# Patient Record
Sex: FEMALE | Race: BLACK OR AFRICAN AMERICAN | NOT HISPANIC OR LATINO | Employment: FULL TIME | URBAN - METROPOLITAN AREA
[De-identification: names, ages, dates, MRNs, and addresses within clinical notes are randomized per-mention and may not be internally consistent; named-entity substitution may affect disease eponyms.]

---

## 2017-03-09 ENCOUNTER — OFFICE VISIT (OUTPATIENT)
Dept: ORTHOPEDICS | Facility: CLINIC | Age: 67
End: 2017-03-09
Payer: MEDICARE

## 2017-03-09 ENCOUNTER — HOSPITAL ENCOUNTER (OUTPATIENT)
Dept: RADIOLOGY | Facility: HOSPITAL | Age: 67
Discharge: HOME | End: 2017-03-09
Attending: ORTHOPAEDIC SURGERY
Payer: MEDICARE

## 2017-03-09 DIAGNOSIS — Z96.649 STATUS POST HIP REPLACEMENT, UNSPECIFIED LATERALITY: Primary | ICD-10-CM

## 2017-03-09 DIAGNOSIS — M54.50 MIDLINE LOW BACK PAIN WITHOUT SCIATICA, UNSPECIFIED CHRONICITY: ICD-10-CM

## 2017-03-09 DIAGNOSIS — Z96.649 STATUS POST HIP REPLACEMENT, UNSPECIFIED LATERALITY: ICD-10-CM

## 2017-03-09 PROCEDURE — 1160F RVW MEDS BY RX/DR IN RCRD: CPT | Mod: S$GLB,,, | Performed by: ORTHOPAEDIC SURGERY

## 2017-03-09 PROCEDURE — 1159F MED LIST DOCD IN RCRD: CPT | Mod: S$GLB,,, | Performed by: ORTHOPAEDIC SURGERY

## 2017-03-09 PROCEDURE — 1157F ADVNC CARE PLAN IN RCRD: CPT | Mod: S$GLB,,, | Performed by: ORTHOPAEDIC SURGERY

## 2017-03-09 PROCEDURE — 72170 X-RAY EXAM OF PELVIS: CPT | Mod: 26,,, | Performed by: RADIOLOGY

## 2017-03-09 PROCEDURE — 99213 OFFICE O/P EST LOW 20 MIN: CPT | Mod: S$GLB,,, | Performed by: ORTHOPAEDIC SURGERY

## 2017-03-09 PROCEDURE — 99999 PR PBB SHADOW E&M-EST. PATIENT-LVL II: CPT | Mod: PBBFAC,,, | Performed by: ORTHOPAEDIC SURGERY

## 2017-03-09 PROCEDURE — 72170 X-RAY EXAM OF PELVIS: CPT | Mod: TC

## 2017-03-09 RX ORDER — TRAMADOL HYDROCHLORIDE 50 MG/1
50 TABLET ORAL
Qty: 60 TABLET | Refills: 2 | Status: SHIPPED | OUTPATIENT
Start: 2017-03-09 | End: 2019-05-14 | Stop reason: SDUPTHER

## 2017-03-09 NOTE — PROGRESS NOTES
CC:  Left hip replacement    Hx:  Kamla Espinoza presents for routine follow up of left hip replacement.  Her procedure was performed 1 year ago.  She has done well postoperatively and she denies effusions, warmth, or fever. She has been having episodic LBP which in general is worsening.    ROS:    Admits low back pain without distal paresthesias, or radicular pain.  Denies Distal edema or calf pain.  Denies fevers.    PE:  No warmth, erythema, or effusion.  Stable to internal/external rotation without pain  No significant pain with palpation over the greater trochanter.  No distal edema.    AP pelvis radiograph was ordered and reviewed and shows no evidence of loosening or wear.    IMP: S/P total hip replacement    Plan: Dental prophylaxis was discussed.    She may follow up prn.    Will initiate PT for LBP.

## 2018-07-25 ENCOUNTER — OFFICE VISIT (OUTPATIENT)
Dept: ORTHOPEDICS | Facility: CLINIC | Age: 68
End: 2018-07-25
Payer: MEDICARE

## 2018-07-25 ENCOUNTER — HOSPITAL ENCOUNTER (OUTPATIENT)
Dept: RADIOLOGY | Facility: HOSPITAL | Age: 68
Discharge: HOME | End: 2018-07-25
Attending: ORTHOPAEDIC SURGERY
Payer: MEDICARE

## 2018-07-25 VITALS
HEART RATE: 114 BPM | DIASTOLIC BLOOD PRESSURE: 72 MMHG | HEIGHT: 64 IN | WEIGHT: 250.31 LBS | BODY MASS INDEX: 42.73 KG/M2 | SYSTOLIC BLOOD PRESSURE: 154 MMHG

## 2018-07-25 DIAGNOSIS — M17.0 PRIMARY OSTEOARTHRITIS OF BOTH KNEES: ICD-10-CM

## 2018-07-25 DIAGNOSIS — M25.562 LEFT KNEE PAIN, UNSPECIFIED CHRONICITY: Primary | ICD-10-CM

## 2018-07-25 DIAGNOSIS — M25.562 LEFT KNEE PAIN, UNSPECIFIED CHRONICITY: ICD-10-CM

## 2018-07-25 PROCEDURE — 3078F DIAST BP <80 MM HG: CPT | Mod: CPTII,S$GLB,, | Performed by: ORTHOPAEDIC SURGERY

## 2018-07-25 PROCEDURE — 20610 DRAIN/INJ JOINT/BURSA W/O US: CPT | Mod: 50,S$GLB,, | Performed by: ORTHOPAEDIC SURGERY

## 2018-07-25 PROCEDURE — 99999 PR PBB SHADOW E&M-EST. PATIENT-LVL III: CPT | Mod: PBBFAC,,, | Performed by: ORTHOPAEDIC SURGERY

## 2018-07-25 PROCEDURE — 3077F SYST BP >= 140 MM HG: CPT | Mod: CPTII,S$GLB,, | Performed by: ORTHOPAEDIC SURGERY

## 2018-07-25 PROCEDURE — 73565 X-RAY EXAM OF KNEES: CPT | Mod: TC

## 2018-07-25 PROCEDURE — 73565 X-RAY EXAM OF KNEES: CPT | Mod: 26,,, | Performed by: RADIOLOGY

## 2018-07-25 PROCEDURE — 99213 OFFICE O/P EST LOW 20 MIN: CPT | Mod: 25,GC,S$GLB, | Performed by: ORTHOPAEDIC SURGERY

## 2018-07-25 RX ORDER — TRIAMCINOLONE ACETONIDE 40 MG/ML
80 INJECTION, SUSPENSION INTRA-ARTICULAR; INTRAMUSCULAR
Status: DISCONTINUED | OUTPATIENT
Start: 2018-07-25 | End: 2018-07-25 | Stop reason: HOSPADM

## 2018-07-25 RX ADMIN — TRIAMCINOLONE ACETONIDE 80 MG: 40 INJECTION, SUSPENSION INTRA-ARTICULAR; INTRAMUSCULAR at 11:07

## 2018-07-25 NOTE — PROCEDURES
Large Joint Aspiration/Injection  Date/Time: 7/25/2018 11:04 AM  Performed by: LIOR HERNANDEZ  Authorized by: LIOR HERNANDEZ     Consent Done?:  Yes (Verbal)  Indications:  Pain  Procedure site marked: Yes    Timeout: Prior to procedure the correct patient, procedure, and site was verified      Location:  Knee  Site:  L knee and R knee  Prep: Patient was prepped and draped in usual sterile fashion    Ultrasonic Guidance for needle placement: No  Needle size:  22 G  Approach:  Anteromedial  Medications:  80 mg triamcinolone acetonide 40 mg/mL

## 2018-07-25 NOTE — LETTER
July 25, 2018      Shannan Wilson MD  [address]  [address]  [facility] [address]           [facility] - Orthopedics  [address], [address]  [facility] [address]  Phone: [phone]          Patient: Kamla Espinoza   MR Number: [id number]   YOB: 1950   Date of Visit: 7/25/2018       Dear Dr. Shannan Wilson:    Thank you for referring Kamla Espinoza to me for evaluation. Attached you will find relevant portions of my assessment and plan of care.    If you have questions, please do not hesitate to call me. I look forward to following Kamla Espinoza along with you.    Sincerely,    Case Javed MD    Enclosure  CC:  No Recipients    If you would like to receive this communication electronically, please contact [email or web address] or [phone] to request more information on [employer] Link access.    For providers and/or their staff who would like to refer a patient to [facility], please contact us through our one-stop-shop provider referral line, [facility], at [phone].    If you feel you have received this communication in error or would no longer like to receive these types of communications, please e-mail [email or web address]

## 2018-07-25 NOTE — PROGRESS NOTES
CHIEF COMPLAINT: Left knee pain.                                                          HISTORY OF PRESENT ILLNESS:  The patient is a 67 y.o. female  who presents for evaluation of her left knee pain. She also has right knee pain    Pain Duration: 2 years  Pain Quality: throbbing  Pain Context:worsening  Pain Timing: near constant  Pain Location:global  Pain Severity: moderate  Modifying Factors: Failed ibuprofen  Associated Signs and Symptoms: LBP with LLE sciatica    She  presents for further treatment recommendations.   She denies distal paresthesias, lower extremity edema. She does report calf pain that is worse with activity.  She has no history of discreet prior trauma.                                                                                                                       PAST MEDICAL HISTORY:    Past Medical History:   Diagnosis Date    Diabetes mellitus, type 2     High cholesterol     Hypertension                                                                                                             PAST SURGICAL HISTORY:    Past Surgical History:   Procedure Laterality Date    HIP SURGERY Left 04/22/2016    THR                                                                                                               SOCIAL HISTORY:  Reviewed per EPIC history for tobacco or alcohol use and she   is an active  67 y.o.  female                                                                             FAMILY MEDICAL HISTORY:  family history includes Diabetes in her mother; Heart disease in her brother, father, and mother; Lung disease in her brother.                                                                REVIEW OF SYSTEMS: Patient denies constitutional symptoms, cardiac symptoms, respiratory symptoms, GI symptoms. The remainder of the musculoskeletal ROS is included in the HPI.                                                                                                     "       PHYSICAL EXAMINATION:      Estimated body mass index is 42.97 kg/m² as calculated from the following:    Height as of this encounter: 5' 4" (1.626 m).    Weight as of this encounter: 113.6 kg (250 lb 5.3 oz).                                                          GENERAL:  A well-developed, well-nourished 67 y.o. female who is alert and  oriented in no acute distress.      Gait: She  walks with a normal gait.                   EXTREMITIES:  Examination of lower extremities reveals there is no visible mass or deformity.    Left knee:  ROM 5-110    Ligamentously stable to varus/valgus stress. Positive crepitation    Anterior and posterior drawers negative.    No pain over pes bursa.    No warmth    No erythema    Effusion yes    Right knee:  ROM 0-110    Ligamentously stable to varus/valgus stress.  Positive crepitation    Anterior and posterior drawers negative.    No pain over pes bursa.    No warmth    No erythema    Effusion Yes    The skin over both lower extremities is normal and unremarkable.  She has a  painless range of motion of the hips and ankles bilaterally.   She has no calf tenderness to palpation nand mild edema.    AP standing radiographs of the knees were ordered and personally reviewed with the patient today.  Radiographs show advanced DJD with joint space narrowing, sclerosis, and osteophytes.                                                                            IMPRESSION: Osteoarthritis bilateral knees.                             The conservative options including NSAIDs, activity modification, physical therapy, corticosteroid injection, and viscosupplimentation were discussed.  Will proceed with injections.  After time out was performed and patient ID, side, and site were verified, the both left and right  knees were sterilly prepped in the standard fashion.  A 22-gauge needle was introduced into each joint from an infero-medial site without complication. Each knee was then " injected with 40 mg kenelog and 3 cc 1% plain lidocaine.  Sterile dressing was applied.  The patient was informed that they may resume activities as tolerated.  Elevation of glucose in diabetic patients was discussed.    I have counselled her on need for weight reduction.    I have personally interviewed and evaluated the patient.  I have reviewed the resident physician's note and I concur with the findings.

## 2018-12-10 ENCOUNTER — TELEPHONE (OUTPATIENT)
Dept: ORTHOPEDICS | Facility: CLINIC | Age: 68
End: 2018-12-10

## 2018-12-10 NOTE — TELEPHONE ENCOUNTER
Called pt and left vm regards to scheduling an appt with Ms. Durbin for her injection. Advise pt to call back to schedule appt.

## 2019-05-14 ENCOUNTER — OFFICE VISIT (OUTPATIENT)
Dept: ORTHOPEDICS | Facility: CLINIC | Age: 69
End: 2019-05-14
Payer: MEDICARE

## 2019-05-14 ENCOUNTER — HOSPITAL ENCOUNTER (OUTPATIENT)
Dept: RADIOLOGY | Facility: HOSPITAL | Age: 69
Discharge: HOME | End: 2019-05-14
Attending: PHYSICIAN ASSISTANT
Payer: MEDICARE

## 2019-05-14 ENCOUNTER — TELEPHONE (OUTPATIENT)
Dept: ORTHOPEDICS | Facility: CLINIC | Age: 69
End: 2019-05-14

## 2019-05-14 VITALS
DIASTOLIC BLOOD PRESSURE: 78 MMHG | HEART RATE: 75 BPM | BODY MASS INDEX: 42.55 KG/M2 | WEIGHT: 249.25 LBS | SYSTOLIC BLOOD PRESSURE: 131 MMHG | HEIGHT: 64 IN

## 2019-05-14 DIAGNOSIS — M17.0 PRIMARY OSTEOARTHRITIS OF BOTH KNEES: Primary | ICD-10-CM

## 2019-05-14 DIAGNOSIS — M25.562 PAIN IN BOTH KNEES, UNSPECIFIED CHRONICITY: ICD-10-CM

## 2019-05-14 DIAGNOSIS — M25.561 PAIN IN BOTH KNEES, UNSPECIFIED CHRONICITY: ICD-10-CM

## 2019-05-14 PROCEDURE — 3075F SYST BP GE 130 - 139MM HG: CPT | Mod: CPTII,S$GLB,, | Performed by: PHYSICIAN ASSISTANT

## 2019-05-14 PROCEDURE — 99213 PR OFFICE/OUTPT VISIT, EST, LEVL III, 20-29 MIN: ICD-10-PCS | Mod: S$GLB,,, | Performed by: PHYSICIAN ASSISTANT

## 2019-05-14 PROCEDURE — 3078F DIAST BP <80 MM HG: CPT | Mod: CPTII,S$GLB,, | Performed by: PHYSICIAN ASSISTANT

## 2019-05-14 PROCEDURE — 3075F PR MOST RECENT SYSTOLIC BLOOD PRESS GE 130-139MM HG: ICD-10-PCS | Mod: CPTII,S$GLB,, | Performed by: PHYSICIAN ASSISTANT

## 2019-05-14 PROCEDURE — 99999 PR PBB SHADOW E&M-EST. PATIENT-LVL IV: ICD-10-PCS | Mod: PBBFAC,,, | Performed by: PHYSICIAN ASSISTANT

## 2019-05-14 PROCEDURE — 73564 X-RAY EXAM KNEE 4 OR MORE: CPT | Mod: 26,50,, | Performed by: RADIOLOGY

## 2019-05-14 PROCEDURE — 3078F PR MOST RECENT DIASTOLIC BLOOD PRESSURE < 80 MM HG: ICD-10-PCS | Mod: CPTII,S$GLB,, | Performed by: PHYSICIAN ASSISTANT

## 2019-05-14 PROCEDURE — 73564 XR KNEE ORTHO BILAT WITH FLEXION: ICD-10-PCS | Mod: 26,50,, | Performed by: RADIOLOGY

## 2019-05-14 PROCEDURE — 99999 PR PBB SHADOW E&M-EST. PATIENT-LVL IV: CPT | Mod: PBBFAC,,, | Performed by: PHYSICIAN ASSISTANT

## 2019-05-14 PROCEDURE — 73564 X-RAY EXAM KNEE 4 OR MORE: CPT | Mod: TC,50

## 2019-05-14 PROCEDURE — 99213 OFFICE O/P EST LOW 20 MIN: CPT | Mod: S$GLB,,, | Performed by: PHYSICIAN ASSISTANT

## 2019-05-14 RX ORDER — LEVOCETIRIZINE DIHYDROCHLORIDE 5 MG/1
TABLET, FILM COATED ORAL
Refills: 4 | COMMUNITY
Start: 2019-05-07 | End: 2019-11-15

## 2019-05-14 RX ORDER — FLUTICASONE PROPIONATE 50 MCG
1 SPRAY, SUSPENSION (ML) NASAL
COMMUNITY

## 2019-05-14 NOTE — TELEPHONE ENCOUNTER
Notified pt that the provider will late; due to accident [place] the [place] of [facility]. New appointment for pt is 5/14/19 at  2:00 pm. Patient states verbal understanding and has no further questions.

## 2019-05-14 NOTE — PROGRESS NOTES
SUBJECTIVE:     Chief Complaint & History of Present Illness:  Kamla Espinoza is a 68 y.o. year old female, established patient, here with a history of constant bilateral knee pain which started years ago.  There is not a history of trauma.  The pain is located in the global aspect of the knee.  The pain is described as achy, 10/10.  There is radiation.  There is not catching or locking.  Aggravating factors include any weight bearing.  Associated symptoms include effusion, crepitus sensation.  There is not numbness or tingling of the lower extremity.  There is back pain. Previous treatments include OTC NSAIDs, rest and cortisone injections which have provided minimal relief.  There is not a history of previous injury or surgery to the knee.  The patient does use an assistive device.    Review of patient's allergies indicates:  No Known Allergies      Current Outpatient Medications   Medication Sig Dispense Refill    ACCU-CHEK JHONATHAN CONTROL SOLN Soln       ACCU-CHEK JHONATHAN PLUS TEST STRP Strp       ACCU-CHEK SOFTCLIX LANCETS Misc       acetaminophen (TYLENOL) 650 MG TbSR Take 650 mg by mouth 3 (three) times daily as needed. Hip pain      amlodipine (NORVASC) 5 MG tablet Take 10 mg by mouth once daily.       aspirin (ECOTRIN) 81 MG EC tablet Take 81 mg by mouth once daily.      BD ALCOHOL SWABS PadM       calcium carbonate 300 mg (750 mg) Chew Take by mouth. tums for acid reflux      diclofenac sodium 1 % Gel Apply 2 g topically as needed.       fluticasone propionate (FLONASE) 50 mcg/actuation nasal spray 1 spray by Nasal route.      levocetirizine (XYZAL) 5 MG tablet TAKE ONE TABLET BY MOUTH EVERY DAY IN THE EVENING AS NEEDED FOR ALLERGY SYMPTOMS.  4    lisinopril-hydrochlorothiazide (PRINZIDE,ZESTORETIC) 20-25 mg Tab Take 1 tablet by mouth once daily.      metformin (GLUCOPHAGE) 500 MG tablet Take 500 mg by mouth 2 (two) times daily with meals.      tramadol (ULTRAM) 50 mg tablet Take 50 mg by  "mouth every 6 (six) hours as needed for Pain.       No current facility-administered medications for this visit.        Past Medical History:   Diagnosis Date    Diabetes mellitus, type 2     High cholesterol     Hypertension        Past Surgical History:   Procedure Laterality Date    HIP SURGERY Left 04/22/2016    THR    REPLACEMENT-HIP-TOTAL WITH NAVIGATION Left 4/22/2016    Performed by Case Javed MD at [facility] OR [address] [place] [address]       Vital Signs (Most Recent)  Vitals:    05/14/19 1356   BP: 131/78   Pulse: 75           Review of Systems:  ROS:  Constitutional: no fever or chills  Eyes: no visual changes  ENT: no nasal congestion or sore throat  Respiratory: no cough or shortness of breath  Cardiovascular: no chest pain or palpitations, positive for HTN  Gastrointestinal: no nausea or vomiting, tolerating diet  Genitourinary: no hematuria or dysuria  Integument/Breast: no rash or pruritis  Hematologic/Lymphatic: no easy bruising or lymphadenopathy  Musculoskeletal: positive for bilateral knee pain  Neurological: no seizures or tremors  Behavioral/Psych: no auditory or visual hallucinations  Endocrine: no heat or cold intolerance, positive for T2DM    OBJECTIVE:     PHYSICAL EXAM:  Height: 5' 4" (162.6 cm) Weight: 113 kg (249 lb 3.7 oz), General Appearance: Well nourished, well developed, in no acute distress.  Neurological: Mood & affect are normal.  left  Knee Exam:  Knee Range of Motion:5-90 degrees flexion   Effusion:not significant  Condition of skin:intact  Location of tenderness:Medial joint line and Lateral joint line   Strength:4 of 5  Stability:  Lachman: stable, LCL: stable, MCL: stable, PCL: stable and posteromedial (dial): stable  Varus /Valgus stress:  normal  Yaneth:   negative/negative    right  Knee Exam:  Knee Range of Motion:0-90 degrees flexion   Effusion:not significant  Condition of skin:intact  Location of tenderness:Medial joint line and Lateral joint line   Strength:4 of " 5  Stability:  Lachman: stable, LCL: stable, MCL: stable, PCL: stable and posteromedial (dial): stable  Varus /Valgus stress:  normal  Yaneth:   negative/negative    RADIOGRAPHS:  Xray of bilateral knee taken in clinic today, images reviewed by me, demonstrates severe tricompartmental narrowing of bilateral knee joint without evidence of fracture or dislocation.    ASSESSMENT/PLAN:     Plan: We discussed with the patient at length all the different treatment options available for the knee including anti-inflammatories, acetaminophen, rest, ice, knee strengthening exercise, occasional cortisone injections for temporary relief, Viscosupplimentation injections, arthroscopic debridement osteotomy, and finally knee arthroplasty.   I discussed weight loss with patient to reach BMI goal of 40 to have knee replacement.  I discussed with her that if I give her a cortisone injection today, a knee replacement would be delayed by 3 months minimum.  Patient states she will withhold from injection at this time.  She has been scheduled with Dr. Saucedo for discussion of knee replacement.  Patient has been losing weight and states she will be able to lose weight needed to have surgery.  Goal weight is 232lbs at this time.  I have referred to Bariatric Medicine as well to help with weight loss.  I will also prescribe compound cream for knee pain.  Patient verbalized understanding and agrees.    Final Diagnosis: Primary osteoarthritis knee, bilateral.  Knee pain, bilateral.

## 2019-07-01 NOTE — PROGRESS NOTES
Subjective:       Patient ID: Kamla Espinoza is a 68 y.o. female.    Chief Complaint: Consult    CC: Weight.    Current attempts at weight loss: New pt to me, referred by Shahram Wilson PA-C  [address]  [place] [address] , with Patient Active Problem List:     Renal impairment- possible CKD 2     Morbid obesity     Type 2 diabetes mellitus with diabetic chronic kidney disease- last A1c 7.4 % on metformin     Essential hypertension     Cardiac murmur     Abnormal EKG     HLD (hyperlipidemia)     Edema     RBC microcytosis     Degenerative joint disease (DJD) of hip     Osteoarthritis of left hip   Lab Results       Component                Value               Date                       CREATININE               0.8                 04/23/2016                 BUN                      16                  04/23/2016                 NA                       138                 04/23/2016                 K                        4.0                 04/23/2016                 CL                       106                 04/23/2016                 CO2                      26                  04/23/2016               Has been cutting back on her portions and starches. She uses a cane. Walks around the house. Light house work. Has not tried water exercise.       Previous diet attempts: Has tried lots of diets, but none for very long. Maybe for a few weeks.     History of medication for loss: Denies.   checked today     Heaviest weight: 265#    Lightest weight:130#    Goal weight: Goal weight is 232lbs at this time for TKR. She would like to get 150#.      Last eye exam:      2 months ago. No glaucoma per pt.      Provider:    Typical eating patterns:  Retired. Lives with daughter, 2 granddaughters and great grandson. Her daughter cooks   Breakfast: toast (1), boiled eggs. Grits.     Lunch: sandwich- luncheon meat, ham with chips.     Dinner: spaghetti, cabbage, meatloaf or chicken with mashed pot. Chicken  "salad. Does not eat out often.    Snacks: popcorn,      Beverages: water, coffee, Iced tea with AS.     Willingness to change: 9/10    EKG: Normal sinus rhythm  Minimal voltage criteria for LVH, may be normal variant  ST and/or T wave abnormalities secondary to hypertrophy and/or T wave  abnormality, consider lateral ischemia  Abnormal ECG  No previous ECGs available    BMR: 1628    Review of Systems   Constitutional: Negative for chills and fever.   Respiratory: Negative for shortness of breath.         + snores   Cardiovascular: Positive for leg swelling. Negative for chest pain.   Gastrointestinal: Negative for constipation and diarrhea.        + gerd   Genitourinary: Positive for frequency. Negative for difficulty urinating and dysuria.   Musculoskeletal: Positive for arthralgias and back pain.   Neurological: Negative for dizziness and light-headedness.   Psychiatric/Behavioral: Negative for dysphoric mood. The patient is not nervous/anxious.        Objective:     /62   Pulse 83   Ht 5' 3.5" (1.613 m)   Wt 112.8 kg (248 lb 10.9 oz)   BMI 43.36 kg/m²    Physical Exam   Constitutional: She is oriented to person, place, and time. She appears well-developed. No distress.   Morbidly obese     HENT:   Head: Normocephalic and atraumatic.   Eyes: Pupils are equal, round, and reactive to light. EOM are normal. No scleral icterus.   Neck: Normal range of motion. Neck supple. No thyromegaly present.   Cardiovascular: Normal rate and normal heart sounds. Exam reveals no gallop and no friction rub.   No murmur heard.  Pulmonary/Chest: Effort normal and breath sounds normal. No respiratory distress. She has no wheezes.   Abdominal: Soft. Bowel sounds are normal. She exhibits no distension. There is no tenderness.   Musculoskeletal: Normal range of motion. She exhibits no edema.   Lymphadenopathy:     She has no cervical adenopathy.   Neurological: She is alert and oriented to person, place, and time. No cranial " nerve deficit.   Skin: Skin is warm and dry. No erythema.   Psychiatric: She has a normal mood and affect. Her behavior is normal. Judgment normal.   Vitals reviewed.      Assessment:       1. Class 3 severe obesity due to excess calories with serious comorbidity and body mass index (BMI) of 40.0 to 44.9 in adult    2. Type 2 diabetes mellitus with stage 2 chronic kidney disease, without long-term current use of insulin    3. Essential hypertension    4. Hyperlipidemia, unspecified hyperlipidemia type    5. Primary osteoarthritis of hip, unspecified laterality    6. Edema, unspecified type        Plan:         1. Class 3 severe obesity due to excess calories with serious comorbidity and body mass index (BMI) of 40.0 to 44.9 in adult  Patient was informed that topiramate is used for migraine prevention and seizures. Weight loss is a common side effect that is well documented. S/he understands this. S/he was informed of the potential side effects such as serious and possibly fatal rash in which case the medication should be discontinued immediately. Paresthesias, forgetfulness, fatigue, kidney stones, GI symptoms, and changes in lab values such as electrolytes, blood counts and kidney function.    Start topiramate  in the evening for 1 week, then morning and evening.         Contact your health insurance about your free gym membership, specifically if there is a location with a pool.       Sit and Be Fit exercise program on [employer], [employer] or youtube 3-4 times a week this month     Limit starchy carbohydrates (bread, rice, pasta, potatoes, corn, peas, oatmeal, grits, tortillas, crackers, chips)    800-1000 tayler menu and meal ideas given.   2. Type 2 diabetes mellitus with stage 2 chronic kidney disease, without long-term current use of insulin  Expect improvement with weight loss. Recheck after 10% TBW lost.      3. Essential hypertension  The current medical regimen is effective;  continue present plan and medications.  Expect improvement with weight loss.     4. Hyperlipidemia, unspecified hyperlipidemia type  Expect improvement with weight loss. Recheck after 10% TBW lost.      5. Primary osteoarthritis of hip, unspecified laterality  Increase low impact activity as tolerated.  Avoid high impact activity, very heavy lifting or other exercise regimens that may cause discomfort.  Contact your health insurance about your free gym membership, specifically if there is a location with a pool.      6. Edema, unspecified type  Expect improvement with weight loss.

## 2019-07-03 ENCOUNTER — INITIAL CONSULT (OUTPATIENT)
Dept: BARIATRICS | Facility: CLINIC | Age: 69
End: 2019-07-03
Payer: MEDICARE

## 2019-07-03 VITALS
SYSTOLIC BLOOD PRESSURE: 124 MMHG | BODY MASS INDEX: 42.46 KG/M2 | DIASTOLIC BLOOD PRESSURE: 62 MMHG | HEIGHT: 64 IN | WEIGHT: 248.69 LBS | HEART RATE: 83 BPM

## 2019-07-03 DIAGNOSIS — E78.5 HYPERLIPIDEMIA, UNSPECIFIED HYPERLIPIDEMIA TYPE: ICD-10-CM

## 2019-07-03 DIAGNOSIS — N18.2 TYPE 2 DIABETES MELLITUS WITH STAGE 2 CHRONIC KIDNEY DISEASE, WITHOUT LONG-TERM CURRENT USE OF INSULIN: ICD-10-CM

## 2019-07-03 DIAGNOSIS — I10 ESSENTIAL HYPERTENSION: ICD-10-CM

## 2019-07-03 DIAGNOSIS — E11.22 TYPE 2 DIABETES MELLITUS WITH STAGE 2 CHRONIC KIDNEY DISEASE, WITHOUT LONG-TERM CURRENT USE OF INSULIN: ICD-10-CM

## 2019-07-03 DIAGNOSIS — M16.10 PRIMARY OSTEOARTHRITIS OF HIP, UNSPECIFIED LATERALITY: ICD-10-CM

## 2019-07-03 DIAGNOSIS — E66.01 CLASS 3 SEVERE OBESITY DUE TO EXCESS CALORIES WITH SERIOUS COMORBIDITY AND BODY MASS INDEX (BMI) OF 40.0 TO 44.9 IN ADULT: Primary | ICD-10-CM

## 2019-07-03 DIAGNOSIS — R60.9 EDEMA, UNSPECIFIED TYPE: ICD-10-CM

## 2019-07-03 PROCEDURE — 99999 PR PBB SHADOW E&M-EST. PATIENT-LVL III: CPT | Mod: PBBFAC,,, | Performed by: INTERNAL MEDICINE

## 2019-07-03 PROCEDURE — 99215 OFFICE O/P EST HI 40 MIN: CPT | Mod: S$GLB,,, | Performed by: INTERNAL MEDICINE

## 2019-07-03 PROCEDURE — 3078F DIAST BP <80 MM HG: CPT | Mod: CPTII,S$GLB,, | Performed by: INTERNAL MEDICINE

## 2019-07-03 PROCEDURE — 1101F PT FALLS ASSESS-DOCD LE1/YR: CPT | Mod: CPTII,S$GLB,, | Performed by: INTERNAL MEDICINE

## 2019-07-03 PROCEDURE — 1101F PR PT FALLS ASSESS DOC 0-1 FALLS W/OUT INJ PAST YR: ICD-10-PCS | Mod: CPTII,S$GLB,, | Performed by: INTERNAL MEDICINE

## 2019-07-03 PROCEDURE — 3074F PR MOST RECENT SYSTOLIC BLOOD PRESSURE < 130 MM HG: ICD-10-PCS | Mod: CPTII,S$GLB,, | Performed by: INTERNAL MEDICINE

## 2019-07-03 PROCEDURE — 99215 PR OFFICE/OUTPT VISIT, EST, LEVL V, 40-54 MIN: ICD-10-PCS | Mod: S$GLB,,, | Performed by: INTERNAL MEDICINE

## 2019-07-03 PROCEDURE — 99999 PR PBB SHADOW E&M-EST. PATIENT-LVL III: ICD-10-PCS | Mod: PBBFAC,,, | Performed by: INTERNAL MEDICINE

## 2019-07-03 PROCEDURE — 3078F PR MOST RECENT DIASTOLIC BLOOD PRESSURE < 80 MM HG: ICD-10-PCS | Mod: CPTII,S$GLB,, | Performed by: INTERNAL MEDICINE

## 2019-07-03 PROCEDURE — 3074F SYST BP LT 130 MM HG: CPT | Mod: CPTII,S$GLB,, | Performed by: INTERNAL MEDICINE

## 2019-07-03 RX ORDER — TOPIRAMATE 25 MG/1
25 TABLET ORAL 2 TIMES DAILY
Qty: 60 TABLET | Refills: 3 | Status: SHIPPED | OUTPATIENT
Start: 2019-07-03 | End: 2019-10-01 | Stop reason: SDUPTHER

## 2019-07-03 NOTE — LETTER
July 9, 2019      Shahram Wilson PA-C  [address]  [facility] [address]           [facility]  [address]  [facility] [address]  Phone: [phone]  Fax: [phone]          Patient: Kamla Espinoza   MR Number: [id number]   YOB: 1950   Date of Visit: 7/3/2019       Dear Shahram Wilson:    Thank you for referring Kamla Espinoza to me for evaluation. Attached you will find relevant portions of my assessment and plan of care.    If you have questions, please do not hesitate to call me. I look forward to following Kamla Espinoza along with you.    Sincerely,    Susan Gore MD    Enclosure  CC:  No Recipients    If you would like to receive this communication electronically, please contact [email or web address] or [phone] to request more information on [employer] Link access.    For providers and/or their staff who would like to refer a patient to [facility], please contact us through our one-stop-shop provider referral line, [facility], at [phone].    If you feel you have received this communication in error or would no longer like to receive these types of communications, please e-mail [email or web address]

## 2019-07-03 NOTE — LETTER
[facility]  [address]  [facility] [address]  Phone: [phone]  Fax: [phone] July 9, 2019      Shahram Wilson PA-C  [address]  [facility] [address]    Patient: Kamla Espinoza   MR Number: [id number]   YOB: 1950   Date of Visit: 7/3/2019     Dear Mr. Carrilloon:    Thank you for referring Kamla Espinoza to me for evaluation. Below are the relevant portions of my assessment and plan of care.    Ms. Espinoza's assessment is as follows:    1. Class 3 severe obesity due to excess calories with serious comorbidity and body mass index (BMI) of 40.0 to 44.9 in adult    2. Type 2 diabetes mellitus with stage 2 chronic kidney disease, without long-term current use of insulin    3. Essential hypertension    4. Hyperlipidemia, unspecified hyperlipidemia type    5. Primary osteoarthritis of hip, unspecified laterality    6. Edema, unspecified type        PLAN:  1. Class 3 severe obesity due to excess calories with serious comorbidity and body mass index (BMI) of 40.0 to 44.9 in adult  The patient was informed that topiramate is used for migraine prevention and seizures. Weight loss is a common side effect that is well documented. She understands this. She was informed of the potential side effects such as serious and possibly fatal rash in which case the medication should be discontinued immediately. Additional side effects include paresthesias, forgetfulness, fatigue, kidney stones, GI symptoms, and changes in lab values such as electrolytes, blood counts and kidney function.     Start topiramate  in the evening for 1 week, then morning and evening.      Contact your health insurance about your free gym membership, specifically if there is a location with a pool.      Sit and Be Fit exercise program on [employer], [employer] or [employer] 3-4 times a week this month      Limit starchy carbohydrates (bread, rice, pasta, potatoes, corn, peas, oatmeal, grits, tortillas, crackers,  chips)     800-1000 tayler menu and meal ideas given.     2. Type 2 diabetes mellitus with stage 2 chronic kidney disease, without long-term current use of insulin  Expect improvement with weight loss. Recheck after 10% TBW lost.       3. Essential hypertension  The current medical regimen is effective;  continue present plan and medications. Expect improvement with weight loss.      4. Hyperlipidemia, unspecified hyperlipidemia type  Expect improvement with weight loss. Recheck after 10% TBW lost.       5. Primary osteoarthritis of hip, unspecified laterality  Increase low impact activity as tolerated.  Avoid high impact activity, very heavy lifting or other exercise regimens that may cause discomfort.  Contact your health insurance about your free gym membership, specifically if there is a location with a pool.       6. Edema, unspecified type  Expect improvement with weight loss.        If you have questions, please do not hesitate to call me. I look forward to following Kamla along with you.    Sincerely,      Susan Gore MD   Section of Bariatric Surgery  Department of Surgery  [facility]    YFNF/toro

## 2019-07-03 NOTE — PATIENT INSTRUCTIONS
Patient was informed that topiramate is used for migraine prevention and seizures. Weight loss is a common side effect that is well documented. S/he understands this. S/he was informed of the potential side effects such as serious and possibly fatal rash in which case the medication should be discontinued immediately. Paresthesias, forgetfulness, fatigue, kidney stones, GI symptoms, and changes in lab values such as electrolytes, blood counts and kidney function.    Start topiramate  in the evening for 1 week, then morning and evening.         Contact your health insurance about your free gym membership, specifically if there is a location with a pool.       Sit and Be Fit exercise program on [employer], [employer] or [employer] 3-4 times a week this month     Limit starchy carbohydrates (bread, rice, pasta, potatoes, corn, peas, oatmeal, grits, tortillas, crackers, chips)    Fruits and Vegetables       Include 1-2 servings of fruit daily.      1 serving of fruit includes ½ cup unsweetened applesauce, ½ medium banana, tennis ball size piece of fruit, 17 grapes, 1 cup melon, 1 cup strawberries, ¼ cup dried fruit     Include 2-3 servings of vegetables daily. 1 serving is 1 cup raw or ½ cup cooked.     Non-starchy vegetables include artichoke, asparagus, baby corn, bamboo shoots, beans: green/Italian/wax, bean sprouts, beets, broccoli, [place] sprouts, cabbage, carrots, cauliflower, celery, cucumber, eggplant, green onions or scallions, greens, jicama, leeks, mushrooms, okra, onions, pea pods, peppers, radishes, spinach, summer squash, tomatoes and salsa, turnips, vegetable juice cocktail, water chestnuts, zucchini              5-Day Menu Plan: 800-1000 Calories    DAY 1     Breakfast  4 slices deli turkey  Small apple    Snack  ¼ cup almonds    Lunch  Lean hamburger lizz  1 cup green beans    Dinner  2 skinless chicken thighs  1 cup cooked carrots    Snack  SF jello    DAY 2    Breakfast  2 eggs with 2 tbsp salsa    Snack  2  slices deli turkey  ½ cup Peaches canned (in its own juice)    Lunch  [place]: Deli chicken and mustard   Pear cup (no sugar added)    Dinner  Baked fish  1 cup cooked yellow squash    Snack  SF popsicle            DAY 3    Breakfast   Protein drink: 1 scoop whey powder + 6oz soy milk    Snack  ¼ cup almonds    Lunch  Tuna Salad: 3oz canned tuna in water, 1 egg, and 1 tsp light rodney)  Pineapple cup (no sugar added)    Dinner  Baked chicken breast  1 cup grilled eggplant    Snack  8oz Chocolate Soy Milk      DAY 4    Breakfast  2 eggs, turkey sausage lizz    Snack  4 slices deli turkey    Lunch    Snack  1/4 cup almonds  Peach cup (no sugar added)    Dinner  3oz baked pork chop  1 cup cooked green beans                    DAY 5    Breakfast  Protein drink: 1 scoop whey powder + 6oz soy milk    Snack   ¼ cup almonds  1 apple    Lunch  1 cup Chicken salad: (3oz canned chicken in water, 1 egg, 1 tsp light rodney)    Snack  4 slices deli turkey  Fruit cup (no sugar added)    Dinner  Omelet: 2 eggs, grilled shrimp, bell pepper and onion      Meal Ideas for Regular Bariatric Diet  *Recipes and products available at [email or web address].com      Breakfast: (15-20g protein)    - Egg white omelet: 2 egg whites or ½ cup Egg Beaters. (Optional proteins: cheese, shrimp, black beans, chicken, sliced turkey) (Optional veggies: tomatoes, salsa, spinach, mushrooms, onions, green peppers, or small slice avocado)     - Egg and sausage: 1 egg or ¼ cup Egg Beaters (any variety), with 1 viktoria or 2 links of Turkey sausage or Veggie breakfast sausage ([employer] or [employer])    - Crust-less breakfast quiche: To make a glass pie dish, mix 4oz part skim Ricotta, 1 cup skim milk, and 2 eggs as your base. Add protein: shredded cheese, sliced lean ham or turkey, turkey finney/sausage. Add veggies: tomato, onion, green onion, mushroom, green pepper, spinach, etc.    - Yogurt parfait: Mix 1 - 6oz container Dannon Light N Fit vanilla yogurt,  with ¼ cup Kashi Go Lean cereal    - Cottage cheese and fruit: ½ cup part-skim cottage cheese or ricotta cheese topped with fresh fruit or sugar free preserves     - Shanna Osborne's [place] Egg custard* (add 2 Tbsp instant coffee granules to make Cappuccino Custard*)    - Hi-Protein café latte (skim milk, decaf coffee, 1 scoop protein powder). Optional to add Sugar free syrup or extract flavoring.    Lunch: (20-30g protein)    - ½ cup Black bean soup (Homemade or Progresso), with ¼ cup shredded low-fat cheese. Top with chopped tomato or fresh salsa.     - Lean deli turkey breast and low-fat sliced cheese, mustard or light rodney to moisten, rolled up together, or wrapped in a [place] lettuce leaf    - Chicken salad made from dinner leftovers, moisten with low-fat salad dressing or light rodney. Also try leftover salmon, shrimp, tuna or boiled eggs. Serve ½ cup over dark green salad    - Fat-free canned refried beans, topped with ¼ cup shredded low-fat cheese. Top with chopped tomato or fresh salsa.     - Greek salad: Top mixed greens with 1-2oz grilled chicken, tomatoes, red onions, 2-3 kalamata olives, and sprinkle lightly with feta cheese. Spritz with Balsamic vinegar to taste.     - Crust-less lunch quiche: To make a glass pie dish, mix 4oz part skim Ricotta, 1 cup skim milk, and 2 eggs as your base. Add protein: shredded cheese, sliced lean ham or turkey, shrimp, chicken. Add veggies: tomato, onion, green onion, mushroom, green pepper, spinach, artichoke, broccoli, etc.    - Pizza bake: tomato sauce, low-fat shredded mozzarella and turkey pepperoni or [place] finney. Add any veggies.    - Cucumber crab bites: Spread ¼ cup crab dip (lump crabmeat + light cream cheese and green onions) over sliced cucumber.     - Chicken with light spinach and artichoke dip*: Puree in : 6oz cooked and drained spinach, 2 cloves garlic, 1 can cannelloni beans, ½ cup chopped green onions, 1 can drained artichoke hearts (not  marinated in oil), lemon juice and basil. Mix in 2oz chopped up chicken.    Supper: (20-30g protein)    - Serve grilled fish over dark green salad tossed with low-fat dressing, served with grilled asparagus olivo     - Rotisserie chicken salad: served with sliced strawberries, walnuts, fat-free feta cheese crumbles and 1 tbsp Gallos Own Light Raspberry [place] Vinaigrette    - Shrimp cocktail: Dip cold boiled shrimp in homemade low-sugar cocktail sauce (1/2 cup Marlo One Carb ketchup, 2 tbsp horseradish, 1/4 tsp hot sauce, 1 tsp Worcestershire sauce, 1 tbsp freshly-squeezed lemon juice). Serve with dark green salad, walnuts, and crumbled blue cheese drizzled with olive oil and Balsamic vinegar    - Tuna Melt: Spread tuna salad onto 2 thick slices of tomato. Top with low-fat cheese and broil until cheese is melted. May also be made with chicken salad of shrimp salad. Malo with different types of cheeses.    - Homemade low-fat Chili using extra lean ground beef or ground turkey. Top with shredded cheese and salsa as desired. May add dollop fat-free sour cream if desired    - Dinner Omelet with shrimp or chicken and onion, green peppers and chives.    - No noodle lasagna: Use sliced zucchini or eggplant in place of noodles.  Layer with part skim ricotta cheese and low sugar meat sauce (use very lean ground beef or ground turkey).    - Mexican chicken bake: Bake chunks of chicken breast or thigh with taco seasoning, Pace brand enchilada sauce, green onions and low-fat cheese. Serve with ¼ cup black beans or fat free refried beans topped with chopped tomatoes or salsa.    - Cynthia frozen meatballs, simmered in Classico Marinara sauce. Different flavors of salsa or spaghetti sauce create different dishes! Sprinkle with parmesan cheese. Serve with grilled or steamed veggies, or a dark green salad.    - Simmer boneless skinless chicken thigh chunks in Classico Marinara sauce or roasted salsa until tender with  chopped onion, bell pepper, garlic, mushrooms, spinach, etc.     - Hamburger, without the bun, dressed the way you like. Served with grilled or steamed veggies.    - Eggplant parmesan: Bake slices of eggplant at 350 degrees for 15 minutes. Layer tomato sauce, sliced eggplant and low-fat mozzarella cheese in a baking dish and cover with foil. Bake 30-40 more minutes or until bubbly. Uncover and bake at 400 degrees for about 15 more minutes, or until top is slightly crisp.    - Fish tacos: grilled/baked white fish, wrapped in [place] lettuce leaf, topped with salsa, shredded low-fat cheese, and light coleslaw.    Snacks: (100-200 calories; >5g protein)    - 1 low-fat cheese stick with 8 cherry tomatoes or 1 serving fresh fruit  - 4 thin slices fat-free turkey breast and 1 slice low-fat cheese  - 4 thin slices fat-free honey ham with wedge of melon  - 1/4 cup unsalted nuts with ½ cup fruit  - 6-oz container Dannon Light n Fit vanilla yogurt, topped with 1oz unsalted nuts         - apple, celery or baby carrots spread with 2 Tbsp natural peanut butter or almond butter   - apple slices with 1 oz slice low-fat cheese  - celery, cucumber, bell pepper or baby carrots dipped in ¼ cup hummus bean spread or light spinach and artichoke dip (*recipe in lunch section)  - 100 calorie bag microwave light popcorn with 3 tbsp grated parmesan cheese  - Mark Links Beef Steak - 14g protein! (similar to beef jerky)  - 2 wedges [place] - Light Herb & Garlic Cheese with sliced cucumber or green bell pepper  - 1/2 cup low-fat cottage cheese with ¼ cup fruit or ¼ cup salsa  - RTD Protein drinks: Atkins, Low Carb Slim Fast, EAS light, Muscle Milk Light, etc.  - Homemade Protein drinks: GNC Soy95, Isopure, Nectar, UNJURY, Whey Gourmet, etc. Mix 1 scoop powder with 8oz skim/1% milk or light soymilk.  - Protein bars: Atkins, EAS, Pure Protein, Think Thin, Detour, etc. Must have 0-4 grams sugar - Read the label.    Takeout Options: No more  than twice/week  Deli - Salads (no pasta or rice), meats, cheeses. Roasted chicken. Lox (salmon)    Mexican - Platters which don't include tortillas, chips, or rice. Go easy on the beans. Example: Fajitas without the tortillas. Ask the  not to bring chips to the table if they are too tempting.    Greek - Meat or fish and vegetable, but no bread or rice. Including hummus, baba ganoush, etc, is OK. Most sit-down Greek restaurants can provide you with cucumber slices for dipping instead of torrey bread.    Fast Food (Avoid as much as possible) - Salads (no croutons and limit salad dressing to 2 tbsp), grilled chicken sandwich without the bun and ask for no rodney. Danelles low fat chili or Taco Bell pintos and cheese.    BBQ - The meats are fine if you ask for sauces on the side, but most of the traditional side dishes are loaded with carbs. Alex slaw, baked beans and BBQ sauce are typically made with sugar.    Chinese - Nothing deep-fried, no rice or noodles. Many Chinese sauces have starch and sugar in them, so you'll have to use your judgement. If you find that these sauces trigger cravings, or cause Dumping, you can ask for the sauce to be made without sugar or just use soy sauce.

## 2019-08-14 ENCOUNTER — OFFICE VISIT (OUTPATIENT)
Dept: ORTHOPEDICS | Facility: CLINIC | Age: 69
End: 2019-08-14
Payer: MEDICARE

## 2019-08-14 VITALS — WEIGHT: 243.94 LBS | BODY MASS INDEX: 43.22 KG/M2 | HEIGHT: 63 IN

## 2019-08-14 DIAGNOSIS — M17.0 PRIMARY OSTEOARTHRITIS OF BOTH KNEES: Primary | ICD-10-CM

## 2019-08-14 PROCEDURE — 1101F PR PT FALLS ASSESS DOC 0-1 FALLS W/OUT INJ PAST YR: ICD-10-PCS | Mod: CPTII,S$GLB,, | Performed by: ORTHOPAEDIC SURGERY

## 2019-08-14 PROCEDURE — 1101F PT FALLS ASSESS-DOCD LE1/YR: CPT | Mod: CPTII,S$GLB,, | Performed by: ORTHOPAEDIC SURGERY

## 2019-08-14 PROCEDURE — 99214 OFFICE O/P EST MOD 30 MIN: CPT | Mod: S$GLB,,, | Performed by: ORTHOPAEDIC SURGERY

## 2019-08-14 PROCEDURE — 99999 PR PBB SHADOW E&M-EST. PATIENT-LVL III: ICD-10-PCS | Mod: PBBFAC,,, | Performed by: ORTHOPAEDIC SURGERY

## 2019-08-14 PROCEDURE — 99999 PR PBB SHADOW E&M-EST. PATIENT-LVL III: CPT | Mod: PBBFAC,,, | Performed by: ORTHOPAEDIC SURGERY

## 2019-08-14 PROCEDURE — 99214 PR OFFICE/OUTPT VISIT, EST, LEVL IV, 30-39 MIN: ICD-10-PCS | Mod: S$GLB,,, | Performed by: ORTHOPAEDIC SURGERY

## 2019-08-14 NOTE — PROGRESS NOTES
Subjective:      Patient ID: Kamla Espinoza is a 69 y.o. female.    Chief Complaint: Pain of the Left Knee and Pain of the Right Knee    HPI  Kamla Espinoza is a 69 year old female here with a several year history of bilateral knee pain. The patient is a  Retired day care worker and a . There was not a history of trauma.  The pain is severe The pain is located in the medial, lateral, anterior aspect of the knee. There is is radiation.  The pain radaites to the shin/thigh.  There is catching or locking.   The pain is described as sharp. The patient has not had prior surgery. It is aggravated by sitting, standing, walking and with moderate activity.  It is alleviated by rest. There is not numbness or tingling of the lower extremity.  There is back pain.  She  has tried medications or injections. They have not helped - NSAIDs, CSIs. She has seen Dr Javed in past for CSIs which used to provide relief, but not anymore; last was 1yr ago.  She does have difficulty getting in or out of a car, getting dressed, or going up or down stairs.  The patient does use an assistive device - cane, wheelchair for long distances.    Past Medical History:   Diagnosis Date    Diabetes mellitus, type 2     High cholesterol     Hypertension        Current Outpatient Medications on File Prior to Visit   Medication Sig Dispense Refill    ACCU-CHEK JHONATHAN CONTROL SOLN Soln       ACCU-CHEK JHONATHAN PLUS TEST STRP Strp       ACCU-CHEK SOFTCLIX LANCETS Misc       acetaminophen (TYLENOL) 650 MG TbSR Take 650 mg by mouth 3 (three) times daily as needed. Hip pain      amlodipine (NORVASC) 5 MG tablet Take 10 mg by mouth once daily.       aspirin (ECOTRIN) 81 MG EC tablet Take 81 mg by mouth once daily.      BD ALCOHOL SWABS PadM       calcium carbonate 300 mg (750 mg) Chew Take by mouth. tums for acid reflux      diclofenac sodium 1 % Gel Apply 2 g topically as needed.       fluticasone propionate (FLONASE) 50  mcg/actuation nasal spray 1 spray by Nasal route.      levocetirizine (XYZAL) 5 MG tablet TAKE ONE TABLET BY MOUTH EVERY DAY IN THE EVENING AS NEEDED FOR ALLERGY SYMPTOMS.  4    lisinopril-hydrochlorothiazide (PRINZIDE,ZESTORETIC) 20-25 mg Tab Take 1 tablet by mouth once daily.      metformin (GLUCOPHAGE) 500 MG tablet Take 500 mg by mouth 2 (two) times daily with meals.      topiramate (TOPAMAX) 25 MG tablet Take 1 tablet (25 mg total) by mouth 2 (two) times daily. 60 tablet 3    tramadol (ULTRAM) 50 mg tablet Take 50 mg by mouth every 6 (six) hours as needed for Pain.       No current facility-administered medications on file prior to visit.        Past Surgical History:   Procedure Laterality Date    HIP SURGERY Left 2016    THR    REPLACEMENT-HIP-TOTAL WITH NAVIGATION Left 2016    Performed by [facility] at [facility] OR [address] [place] [address]       Family History   Problem Relation Age of Onset    Diabetes Mother     Heart disease Mother     Heart disease Father     Heart disease Brother     Lung disease Brother        Social History     Socioeconomic History    Marital status:      Spouse name: Not on file    Number of children: Not on file    Years of education: Not on file    Highest education level: Not on file   Occupational History    Not on file   Social Needs    Financial resource strain: Not on file    Food insecurity:     Worry: Not on file     Inability: Not on file    Transportation needs:     Medical: Not on file     Non-medical: Not on file   Tobacco Use    Smoking status: Former Smoker     Types: Cigarettes     Last attempt to quit: 3/17/2010     Years since quittin.4    Smokeless tobacco: Never Used   Substance and Sexual Activity    Alcohol use: Yes     Alcohol/week: 0.0 oz     Comment: occasionally    Drug use: No    Sexual activity: Not on file   Lifestyle    Physical activity:     Days per week: Not on file     Minutes per session: Not on file    Stress:  Not on file   Relationships    Social connections:     Talks on phone: Not on file     Gets together: Not on file     Attends Christian service: Not on file     Active member of club or organization: Not on file     Attends meetings of clubs or organizations: Not on file     Relationship status: Not on file   Other Topics Concern    Not on file   Social History Narrative    Not on file       Review of Systems   Constitution: Negative for chills, fever and night sweats.   HENT: Negative for hearing loss.    Eyes: Negative for blurred vision and double vision.   Cardiovascular: Negative for chest pain, claudication and leg swelling.   Respiratory: Negative for shortness of breath.    Endocrine: Negative for polydipsia, polyphagia and polyuria.   Hematologic/Lymphatic: Negative for adenopathy and bleeding problem. Does not bruise/bleed easily.   Skin: Negative for poor wound healing.   Musculoskeletal: Positive for back pain and joint pain.   Gastrointestinal: Negative for diarrhea and heartburn.   Genitourinary: Negative for bladder incontinence.   Neurological: Negative for focal weakness, headaches, numbness, paresthesias and sensory change.   Psychiatric/Behavioral: The patient is not nervous/anxious.    Allergic/Immunologic: Negative for persistent infections.         Objective:      Body mass index is 43.21 kg/m².        General    Constitutional: She is oriented to person, place, and time. She appears well-developed and well-nourished. No distress.   obese   HENT:   Head: Normocephalic and atraumatic.   Nose: Nose normal.   Eyes: Conjunctivae and EOM are normal. Pupils are equal, round, and reactive to light.   Neck: Normal range of motion.   Cardiovascular: Normal rate, regular rhythm and intact distal pulses.    Pulmonary/Chest: Effort normal. No respiratory distress.   Abdominal: Soft. There is no tenderness.   Neurological: She is alert and oriented to person, place, and time. She has normal reflexes.    Psychiatric: She has a normal mood and affect. Her behavior is normal.     General Musculoskeletal Exam   Gait: abnormal and antalgic       Right Knee Exam     Inspection   Erythema: absent  Scars: absent  Swelling: present  Effusion: absent  Deformity: present (varus)  Bruising: absent    Tenderness   The patient is tender to palpation of the medial joint line, lateral joint line and patella.    Crepitus   The patient has crepitus of the patella, medial joint line and lateral joint line.    Range of Motion   Extension: 0   Flexion: 100     Tests   Meniscus   Yaneth:  Medial - positive   Ligament Examination Lachman: normal (-1 to 2mm) PCL-Posterior Drawer: normal (0 to 2mm)     MCL - Valgus: normal (0 to 2mm)  LCL - Varus: normal  Patella   Passive Patellar Tilt: neutral  Patellar Tracking: normal  Patellar Grind: positive    Left Knee Exam     Inspection   Erythema: absent  Scars: absent  Swelling: present  Effusion: absent  Deformity: present (varus)  Bruising: absent    Tenderness   The patient tender to palpation of the medial joint line, patella and lateral joint line.    Crepitus   The patient has crepitus of the lateral joint line, medial joint line and patella.    Range of Motion   Extension: 0   Flexion: 100     Tests   Meniscus   Yaneth:  Medial - positive Lateral - positive  Stability Lachman: normal (-1 to 2mm) PCL-Posterior Drawer: normal (0 to 2mm)  MCL - Valgus: normal (0 to 2mm)  LCL - Varus: normal (0 to 2mm)  Patella   Passive Patellar Tilt: neutral  Patellar Tracking: normal  Patellar Grind: positive    Other   Sensation: normal    Muscle Strength   Right Lower Extremity   Hip Abduction: 5/5   Quadriceps:  5/5   Hamstrin/5   Left Lower Extremity   Hip Abduction: 5/5   Quadriceps:  5/5   Hamstrin/5     Reflexes     Left Side  Quadriceps:  2+  Achilles:  2+    Right Side   Quadriceps:  2+  Achilles:  2+    Vascular Exam     Right Pulses  Dorsalis Pedis:      2+  Posterior Tibial:       2+        Left Pulses  Dorsalis Pedis:      2+  Posterior Tibial:      2+        Edema  Right Lower Leg: absent  Left Lower Leg: absent        Radiographs taken today and reviewed by me demonstrate severe arthritic change of the bilateral KNEE(s).There  is not bone destruction.  There is not a fracture. The medial compartment is most involved.    The changes are tricompartmental.                Assessment:       Encounter Diagnoses   Name Primary?    Primary osteoarthritis of both knees Yes    BMI 40.0-44.9, adult           Plan:       Kamla was seen today for pain and pain.    Diagnoses and all orders for this visit:    Primary osteoarthritis of both knees    BMI 40.0-44.9, adult      Discussed surgery in detail.  She is still lowering her BMI.  Will give a tentative date an d Send to PT for conditioning and strengthening.  Will see her back prior to 3-4 weeks surgery.

## 2019-08-15 ENCOUNTER — TELEPHONE (OUTPATIENT)
Dept: ORTHOPEDICS | Facility: CLINIC | Age: 69
End: 2019-08-15

## 2019-08-15 NOTE — TELEPHONE ENCOUNTER
----- Message from Ld Vences sent at 8/15/2019  9:42 AM CDT -----  Contact: Self   Needs Advice    Reason for call: Calling in regards to setting up surgery.         Communication Preference: [phone] (home)       Additional Information:

## 2019-08-15 NOTE — TELEPHONE ENCOUNTER
Spoke to pt, scheduled appointment with Dr. Saucedo on 9/30, penciled tentative sx date 10/31 in book. Pt pleased and verbalized understanding.

## 2019-08-28 ENCOUNTER — CLINICAL SUPPORT (OUTPATIENT)
Dept: REHABILITATION | Facility: HOSPITAL | Age: 69
End: 2019-08-28
Attending: ORTHOPAEDIC SURGERY
Payer: MEDICARE

## 2019-08-28 DIAGNOSIS — M25.662 DECREASED RANGE OF MOTION (ROM) OF BOTH KNEES: ICD-10-CM

## 2019-08-28 DIAGNOSIS — M25.562 CHRONIC PAIN OF BOTH KNEES: ICD-10-CM

## 2019-08-28 DIAGNOSIS — R26.2 DIFFICULTY WALKING: ICD-10-CM

## 2019-08-28 DIAGNOSIS — M25.561 CHRONIC PAIN OF BOTH KNEES: ICD-10-CM

## 2019-08-28 DIAGNOSIS — G89.29 CHRONIC PAIN OF BOTH KNEES: ICD-10-CM

## 2019-08-28 DIAGNOSIS — M25.661 DECREASED RANGE OF MOTION (ROM) OF BOTH KNEES: ICD-10-CM

## 2019-08-28 PROCEDURE — 97161 PT EVAL LOW COMPLEX 20 MIN: CPT | Mod: PO

## 2019-08-28 PROCEDURE — 97110 THERAPEUTIC EXERCISES: CPT | Mod: PO

## 2019-08-28 NOTE — PLAN OF CARE
Physical Therapy Initial Evaluation     Name: Kamla Espinoza  Clinic Number: [id number]    Therapy Diagnosis:   Encounter Diagnoses   Name Primary?    Chronic pain of both knees     Difficulty walking     Decreased range of motion (ROM) of both knees      Physician: Raffaele Saucedo MD    Physician Orders: PT Eval and Treat ,    Strengthening and conditioning prior to knee surgery          Medical Diagnosis from Referral: B Knee OA  Evaluation Date: 8/28/2019  Authorization Period Expiration: 12/31/2019  Plan of Care Expiration: 10/17/2019  Visit # / Visits authorized: 1/ 20    Time In: 9:30  Time Out: 10:00  Total Billable Time: 30 minutes    Precautions: Diabetes    Subjective     Date of onset: several years ago  History of current condition - Kamla reports: progressively worsening B knee pain, states some days one is worse than the others.  Patient states she is going to have a TKA October 31st, MD hasn't decided which knee she is going to have replaced first.   Patient c/o B knee pain, pain used to only be in the front of her knee, but now she's starting to have posterior knee pain as well as intermittent shooting pain into her lower leg     Medical History:   Past Medical History:   Diagnosis Date    Diabetes mellitus, type 2     High cholesterol     Hypertension        Surgical History:   Kamla Espinoza  has a past surgical history that includes Hip surgery (Left, 04/22/2016).    Medications:   Kamla has a current medication list which includes the following prescription(s): accu-chek jose control soln, accu-chek jose plus test strp, accu-chek softclix lancets, acetaminophen, amlodipine, aspirin, bd alcohol swabs, calcium carbonate, diclofenac sodium, fluticasone propionate, levocetirizine, lisinopril-hydrochlorothiazide, metformin, topiramate, and tramadol.    Allergies:   Review of patient's allergies indicates:  No Known Allergies      Imaging, xray: There is advanced degenerative osteoarthritic change of the bilateral tibiofemoral and patellofemoral compartments.  There is no evidence of osseous fracture.  The osseous structures are otherwise unremarkable.      Previous treatment for this condition: injection last month without relief    Pain Scale:  Current: 0/10    Best: 0/10    Worst: 10/10    Easing factors:  rest  Prior functional status: has been severely limited with standing/walking for at least the past year  Current Functional Limitations:    Walking: using cane for community ambulation, sometimes using walker; ambulates independently at home, can tolerate no more than 5 minutes  Stairs: tries to avoid it, has to turn sideways to descend  Driving: does not perform  Dressing: uses shoe horn to don socks/shoes  Housework: severe limitation, can tolerate standing for short perdiods due to pain in back and knees      Occupation:  N/A                Pts goals:  Less pain and be able to walk easier and longer    Objective     Observation/Gait: Enters clinic using hurry cane on L.  Moderate knee flexion B throughout all phases of gait, moderate lumbar flexion.  Equal step length and stance time.    Range of Motion: Knee   RIGHT LEFT   Flexion: 84 87   Extension -15 -15     Strength: Knee and Ankle   RIGHT  LIEFT   Quadriceps  Knee Extension 3+/5 3+/5   Hamstrings  Knee Flexion 3+/5 3+/5   Gastroc/Soleus  Plantarflexion 4-/5 4-/5   Anterior Tibialis  Dorsiflexion   4+/5 4+/5       Strength: Hip    RIGHT  LEFT   Hip Flexion 4-/5 4-/5   Abduction 3+/5 3+/5   IR 4-/5 4-/5   ER 4/5 4/5   Ext 3+/5 3+/5       Joint Mobility: hypomobile B patella   Palpation: B patella resting superiorly and laterally in joint/ moderate TTP L medial joint line, TTP B medial HS  Sensation: intact to light touch    Pt to fill out FOTO next session      TREATMENT   Treatment Time In: 10:00  Treatment Time Out: 10:30  Total Treatment time separate from Evaluation: 30  "minutes    Kamla received therapeutic exercises to develop strength, endurance, ROM and flexibility for 23 minutes including:    Seated heel slides 10 x 5" hold  Seated heel/toe raises 3 x 10  Seated add squeezes 5" hold x 2 minutes  LAQ 2 x 10  Seated HSS 2 x 30"  Seated gastroc stretch 2 x 30"    Home Exercises and Patient Education Provided    Education provided:   - importance of improving strength and mobility prior to upcoming TKA    Written Home Exercises Provided: yes.  Exercises were reviewed and Kamla was able to demonstrate them prior to the end of the session.  Kamla demonstrated good  understanding of the education provided.     See EMR under Patient Instructions for exercises provided 8/28/2019.    ASSESSMENT     Kamla is a 69 y.o. female referred to outpatient Physical Therapy with a medical diagnosis of B knee OA. Pt presents with moderate functional limitations secondary to  moderate limitations in knee flexion/extension, limited patellar mobility, and weakness related to several months of progressively worsening pain. Patient also c/o LBP that is likely related to compensated postures from limitations in knee mobility. Patient will benefit from treatment focused on restoring functional strength and mobility prior to upcoming TKA's.    Patient prognosis is Good.   Pt will benefit from skilled outpatient Physical Therapy to address the deficits stated above and in the chart below, provide pt/family education, and to maximize pt's level of independence.     Plan of care discussed with patient: Yes  Pt's spiritual, cultural and educational needs considered and patient is agreeable to the plan of care and goals as stated below:     Anticipated Barriers for therapy: none    Medical Necessity is demonstrated by the following  History  Co-morbidities and personal factors that may impact the plan of care Co-morbidities:   See PMH above    Personal Factors:   no deficits     moderate "   Examination  Body Structures and Functions, activity limitations and participation restrictions that may impact the plan of care Body Regions:   back  lower extremities    Body Systems:    gross symmetry  ROM  strength  gross coordinated movement  balance  gait  transfers    Participation Restrictions:   none    Activity limitations:   Learning and applying knowledge  no deficits    General Tasks and Commands  no deficits    Communication  no deficits    Mobility  lifting and carrying objects  walking    Self care  dressing    Domestic Life  shopping  cooking  doing house work (cleaning house, washing dishes, laundry)    Interactions/Relationships  no deficits    Life Areas  no deficits    Community and Social Life  no deficits         moderate   Clinical Presentation stable and uncomplicated low   Decision Making/ Complexity Score: low     Goals:  Short Term Goals:   1. Increase B knee AROM -10-89 degrees , pain-free, within 3 weeks to restore normal functional mobility  2. Patient able to tolerate at least 5 minutes sustained standing/walking with B knee pain less than 6/10 within 3 weeks    Long Term Goals  1. Patient to report B knee pain less than 7/10 at all times within 6 weeks for improved celina to sustained activity  2. Patient able to tolerate at least 10 minutes sustained standing/walking with R knee pain less than 6/10 within 6 weeks to improve functional mobility  3. Increase B knee AROM to -5 to 100 degrees within 6 weeks to improve mobility prior to upcoming TKA's  4. Increase B LE strength to at least 4-/5 throughout within 8 weeks for improved endurance with sustained activity      PLAN     Plan of care Certification: 8/28/2019 to 10/16/2019.    Outpatient Physical Therapy 2 times weekly for 6 weeks to include the following interventions: Gait Training, Manual Therapy, Neuromuscular Re-ed and Therapeutic Exercise.     Carlita Nunez, PT

## 2019-08-30 ENCOUNTER — TELEPHONE (OUTPATIENT)
Dept: REHABILITATION | Facility: HOSPITAL | Age: 69
End: 2019-08-30

## 2019-09-10 ENCOUNTER — CLINICAL SUPPORT (OUTPATIENT)
Dept: REHABILITATION | Facility: HOSPITAL | Age: 69
End: 2019-09-10
Payer: MEDICARE

## 2019-09-10 DIAGNOSIS — G89.29 CHRONIC PAIN OF BOTH KNEES: ICD-10-CM

## 2019-09-10 DIAGNOSIS — R26.2 DIFFICULTY WALKING: ICD-10-CM

## 2019-09-10 DIAGNOSIS — M25.562 CHRONIC PAIN OF BOTH KNEES: ICD-10-CM

## 2019-09-10 DIAGNOSIS — M25.662 DECREASED RANGE OF MOTION (ROM) OF BOTH KNEES: ICD-10-CM

## 2019-09-10 DIAGNOSIS — M25.661 DECREASED RANGE OF MOTION (ROM) OF BOTH KNEES: ICD-10-CM

## 2019-09-10 DIAGNOSIS — M25.561 CHRONIC PAIN OF BOTH KNEES: ICD-10-CM

## 2019-09-10 PROCEDURE — 97110 THERAPEUTIC EXERCISES: CPT | Mod: PO

## 2019-09-10 NOTE — PROGRESS NOTES
"                                                    Physical Therapy Daily Note     Name: Kamla Espinoza  Clinic Number: [id number]  Diagnosis:   Encounter Diagnoses   Name Primary?    Chronic pain of both knees     Difficulty walking     Decreased range of motion (ROM) of both knees      Physician: Raffaele Saucedo MD  Precautions: diabetes and standard  Visit #: 2 of 20    Time In: 9:17 (pt arrived late)  Time Out: 9:55  1:1 treatment time: 25 minutes    Visit amount: 60.64  Total amount: ~203.64     Initial Evaluation Date: 8/28/19  POC Expiration: 10/17/19    Subjective     Pt reports that she is in 10/10 pain just from walking from her car. She has been compliant c/ HEP.      Objective     Kamla received individual therapeutic exercises to develop strength, endurance, ROM and flexibility for 38 minutes including:    Seated heel slides 20 x 5" hold  Seated heel/toe raises 3 x 10  Seated add squeezes 5" hold x 2 minutes  LAQ 2 x 10  Seated HSS 2 x 30"  Gastroc wedge stretch 2 x 30"  Standing heel raises 2 x 109  Standing HS curls 2 x 10      Written Home Exercises Provided: at eval  Pt demo good understanding of the education provided. Kamla demonstrated good return demonstration of activities.     Education provided re: importance of improving strength and mobility  Kamla verbalized good understanding of education provided.   No spiritual or educational barriers to learning provided    Assessment     Patient tolerated al interventions well c/ minimal complaints of discomfort.  Frequent rest breaks required throughout session.  Continue to progress based on pt's tolerance.  This is a 69 y.o. female referred to outpatient physical therapy and presents with a medical diagnosis of B knee OA and demonstrates limitations as described in the problem list. Pt prognosis is Good. Pt will continue to benefit from skilled outpatient physical therapy to address the deficits listed in the problem list, provide " pt/family education and to maximize pt's level of independence in the home and community environment.     Goals:  Short Term Goals:   1. Increase B knee AROM -10-89 degrees , pain-free, within 3 weeks to restore normal functional mobility  2. Patient able to tolerate at least 5 minutes sustained standing/walking with B knee pain less than 6/10 within 3 weeks     Long Term Goals  1. Patient to report B knee pain less than 7/10 at all times within 6 weeks for improved celina to sustained activity  2. Patient able to tolerate at least 10 minutes sustained standing/walking with R knee pain less than 6/10 within 6 weeks to improve functional mobility  3. Increase B knee AROM to -5 to 100 degrees within 6 weeks to improve mobility prior to upcoming TKA's  4. Increase B LE strength to at least 4-/5 throughout within 8 weeks for improved endurance with sustained activity     Plan     Continue with established Plan of Care towards PT goals.    Therapist: Drew Dubose, PT,DPT  9/10/2019

## 2019-09-19 ENCOUNTER — CLINICAL SUPPORT (OUTPATIENT)
Dept: REHABILITATION | Facility: HOSPITAL | Age: 69
End: 2019-09-19
Payer: MEDICARE

## 2019-09-19 DIAGNOSIS — M25.562 CHRONIC PAIN OF BOTH KNEES: ICD-10-CM

## 2019-09-19 DIAGNOSIS — M25.561 CHRONIC PAIN OF BOTH KNEES: ICD-10-CM

## 2019-09-19 DIAGNOSIS — M25.661 DECREASED RANGE OF MOTION (ROM) OF BOTH KNEES: ICD-10-CM

## 2019-09-19 DIAGNOSIS — M25.662 DECREASED RANGE OF MOTION (ROM) OF BOTH KNEES: ICD-10-CM

## 2019-09-19 DIAGNOSIS — G89.29 CHRONIC PAIN OF BOTH KNEES: ICD-10-CM

## 2019-09-19 DIAGNOSIS — R26.2 DIFFICULTY WALKING: ICD-10-CM

## 2019-09-19 PROCEDURE — 97110 THERAPEUTIC EXERCISES: CPT | Mod: PO

## 2019-09-23 NOTE — PROGRESS NOTES
"                                                    Physical Therapy Daily Note     Name: Kamla Espinoza  Clinic Number: [id number]  Diagnosis:   Encounter Diagnoses   Name Primary?    Chronic pain of both knees     Difficulty walking     Decreased range of motion (ROM) of both knees      Physician: Raffaele Saucedo MD  Precautions: diabetes and standard  Visit #: 4 of 20    Time In: 9:00  Time Out: 9:45  1:1 treatment time: 30 minutes    Visit amount: 60.64  Total amount: ~324.92     Initial Evaluation Date: 8/28/19  POC Expiration: 10/17/19    Subjective     Pt reports that she is in 3/10 pain.  She is feeling much better today compared to last session.      Objective     Kamla received individual therapeutic exercises to develop strength, endurance, ROM and flexibility for 45 minutes including:    Quad sets 2 minutes c/ 5" holds-  SAQ 2 x 10-  SLR 2 x 10-  Glute sets 2 x 10 c/ 5" holds-  Seated heel slides 20 x 5" hold  Seated marches x 20 B-  Seated heel/toe raises 3 x 10-  Seated add squeezes 5" hold x 2 minutes-  LAQ 3 x 10-  Seated HSS 2 x 30"-  Gastroc wedge stretch 2 x 30"-  Standing heel raises 2 x 10  Standing HS curls 2 x 10-  Step ups on airex x 10      Written Home Exercises Provided: at eval  Pt demo good understanding of the education provided. Kamla demonstrated good return demonstration of activities.     Education provided re: importance of improving strength and mobility  Kamla verbalized good understanding of education provided.   No spiritual or educational barriers to learning provided    Assessment     Pt able to tolerate all interventions well without any complaints of pain or discomfort.  Frequent rest breaks required throughout session.  Continue to progress based on pt's tolerance.  This is a 69 y.o. female referred to outpatient physical therapy and presents with a medical diagnosis of B knee OA and demonstrates limitations as described in the problem list. Pt prognosis is " Good. Pt will continue to benefit from skilled outpatient physical therapy to address the deficits listed in the problem list, provide pt/family education and to maximize pt's level of independence in the home and community environment.     Goals:  Short Term Goals:   1. Increase B knee AROM -10-89 degrees , pain-free, within 3 weeks to restore normal functional mobility  2. Patient able to tolerate at least 5 minutes sustained standing/walking with B knee pain less than 6/10 within 3 weeks     Long Term Goals  1. Patient to report B knee pain less than 7/10 at all times within 6 weeks for improved celina to sustained activity  2. Patient able to tolerate at least 10 minutes sustained standing/walking with R knee pain less than 6/10 within 6 weeks to improve functional mobility  3. Increase B knee AROM to -5 to 100 degrees within 6 weeks to improve mobility prior to upcoming TKA's  4. Increase B LE strength to at least 4-/5 throughout within 8 weeks for improved endurance with sustained activity     Plan     Continue with established Plan of Care towards PT goals.    Therapist: Drew Dubose, PT,DPT  9/24/2019

## 2019-09-24 ENCOUNTER — CLINICAL SUPPORT (OUTPATIENT)
Dept: REHABILITATION | Facility: HOSPITAL | Age: 69
End: 2019-09-24
Payer: MEDICARE

## 2019-09-24 DIAGNOSIS — R26.2 DIFFICULTY WALKING: ICD-10-CM

## 2019-09-24 DIAGNOSIS — M25.662 DECREASED RANGE OF MOTION (ROM) OF BOTH KNEES: ICD-10-CM

## 2019-09-24 DIAGNOSIS — M25.562 CHRONIC PAIN OF BOTH KNEES: ICD-10-CM

## 2019-09-24 DIAGNOSIS — G89.29 CHRONIC PAIN OF BOTH KNEES: ICD-10-CM

## 2019-09-24 DIAGNOSIS — M25.661 DECREASED RANGE OF MOTION (ROM) OF BOTH KNEES: ICD-10-CM

## 2019-09-24 DIAGNOSIS — M25.561 CHRONIC PAIN OF BOTH KNEES: ICD-10-CM

## 2019-09-24 PROCEDURE — 97110 THERAPEUTIC EXERCISES: CPT | Mod: PO

## 2019-09-26 NOTE — PROGRESS NOTES
"                                                    Physical Therapy Daily Note     Name: Kamla Espinoza  Clinic Number: [id number]  Diagnosis:   Encounter Diagnoses   Name Primary?    Chronic pain of both knees     Difficulty walking     Decreased range of motion (ROM) of both knees      Physician: Raffaele Saucedo MD  Precautions: diabetes and standard  Visit #: 5 of 20    Time In: 9:07 (pt arrived late)  Time Out: 9:55  1:1 treatment time:25 minutes    Visit amount: 60.64  Total amount: ~385.56     Initial Evaluation Date: 8/28/19  POC Expiration: 10/17/19    Subjective     Pt reports that she doing well today and is not experiencing any pain today.      Objective     Kamla received individual therapeutic exercises to develop strength, endurance, ROM and flexibility for 48 minutes including:    Quad sets 2 minutes c/ 5" holds-  SAQ 2 x 10-  SLR 2 x 10-  Glute sets 2 x 10 c/ 5" holds-  Seated heel slides 20 x 5" hold  Seated marches x 20 B-  Seated heel/toe raises 3 x 10-  Seated add squeezes 5" hold x 2 minutes-  LAQ 3 x 10-  Seated HSS 2 x 30"-  Gastroc wedge stretch 2 x 30"-  Standing heel raises 2 x 10  Standing HS curls 2 x 10-  Step ups on airex x 10      Written Home Exercises Provided: at eval  Pt demo good understanding of the education provided. Kamla demonstrated good return demonstration of activities.     Education provided re: importance of improving strength and mobility  Kamla verbalized good understanding of education provided.   No spiritual or educational barriers to learning provided    Assessment     Pt able to tolerate all interventions well without any complaints of pain or discomfort.  Frequent rest breaks required throughout session.  No new exercises at today's session. Continue to progress based on pt's tolerance.  This is a 69 y.o. female referred to outpatient physical therapy and presents with a medical diagnosis of B knee OA and demonstrates limitations as described in " the problem list. Pt prognosis is Good. Pt will continue to benefit from skilled outpatient physical therapy to address the deficits listed in the problem list, provide pt/family education and to maximize pt's level of independence in the home and community environment.     Goals:  Short Term Goals:   1. Increase B knee AROM -10-89 degrees , pain-free, within 3 weeks to restore normal functional mobility  2. Patient able to tolerate at least 5 minutes sustained standing/walking with B knee pain less than 6/10 within 3 weeks     Long Term Goals  1. Patient to report B knee pain less than 7/10 at all times within 6 weeks for improved celina to sustained activity  2. Patient able to tolerate at least 10 minutes sustained standing/walking with R knee pain less than 6/10 within 6 weeks to improve functional mobility  3. Increase B knee AROM to -5 to 100 degrees within 6 weeks to improve mobility prior to upcoming TKA's  4. Increase B LE strength to at least 4-/5 throughout within 8 weeks for improved endurance with sustained activity     Plan     Continue with established Plan of Care towards PT goals.    Therapist: Drew Dubose, PT,DPT  9/27/2019

## 2019-09-27 ENCOUNTER — CLINICAL SUPPORT (OUTPATIENT)
Dept: REHABILITATION | Facility: HOSPITAL | Age: 69
End: 2019-09-27
Payer: MEDICARE

## 2019-09-27 DIAGNOSIS — R26.2 DIFFICULTY WALKING: ICD-10-CM

## 2019-09-27 DIAGNOSIS — M25.661 DECREASED RANGE OF MOTION (ROM) OF BOTH KNEES: ICD-10-CM

## 2019-09-27 DIAGNOSIS — G89.29 CHRONIC PAIN OF BOTH KNEES: ICD-10-CM

## 2019-09-27 DIAGNOSIS — M25.562 CHRONIC PAIN OF BOTH KNEES: ICD-10-CM

## 2019-09-27 DIAGNOSIS — M25.561 CHRONIC PAIN OF BOTH KNEES: ICD-10-CM

## 2019-09-27 DIAGNOSIS — M25.662 DECREASED RANGE OF MOTION (ROM) OF BOTH KNEES: ICD-10-CM

## 2019-09-27 PROCEDURE — 97110 THERAPEUTIC EXERCISES: CPT | Mod: PO

## 2019-09-30 ENCOUNTER — OFFICE VISIT (OUTPATIENT)
Dept: ORTHOPEDICS | Facility: CLINIC | Age: 69
End: 2019-09-30
Payer: MEDICARE

## 2019-09-30 VITALS — HEIGHT: 62 IN | BODY MASS INDEX: 43.41 KG/M2 | WEIGHT: 235.88 LBS

## 2019-09-30 DIAGNOSIS — M17.0 PRIMARY OSTEOARTHRITIS OF BOTH KNEES: Primary | ICD-10-CM

## 2019-09-30 PROCEDURE — 1101F PT FALLS ASSESS-DOCD LE1/YR: CPT | Mod: CPTII,S$GLB,, | Performed by: ORTHOPAEDIC SURGERY

## 2019-09-30 PROCEDURE — 99213 OFFICE O/P EST LOW 20 MIN: CPT | Mod: S$GLB,,, | Performed by: ORTHOPAEDIC SURGERY

## 2019-09-30 PROCEDURE — 1101F PR PT FALLS ASSESS DOC 0-1 FALLS W/OUT INJ PAST YR: ICD-10-PCS | Mod: CPTII,S$GLB,, | Performed by: ORTHOPAEDIC SURGERY

## 2019-09-30 PROCEDURE — 99999 PR PBB SHADOW E&M-EST. PATIENT-LVL II: ICD-10-PCS | Mod: PBBFAC,,, | Performed by: ORTHOPAEDIC SURGERY

## 2019-09-30 PROCEDURE — 99999 PR PBB SHADOW E&M-EST. PATIENT-LVL II: CPT | Mod: PBBFAC,,, | Performed by: ORTHOPAEDIC SURGERY

## 2019-09-30 PROCEDURE — 99213 PR OFFICE/OUTPT VISIT, EST, LEVL III, 20-29 MIN: ICD-10-PCS | Mod: S$GLB,,, | Performed by: ORTHOPAEDIC SURGERY

## 2019-09-30 NOTE — PROGRESS NOTES
"                                                    Physical Therapy Daily Note     Name: Kamla Espinoza  Clinic Number: [id number]  Diagnosis:   Encounter Diagnoses   Name Primary?    Chronic pain of both knees     Difficulty walking     Decreased range of motion (ROM) of both knees      Physician: Raffaele Saucedo MD  Precautions: diabetes and standard  Visit #: 6 of 20    Time In: 9:58  Time Out: 10:53  1:1 treatment time:55 minutes    Visit amount: 121.28  Total amount: ~506.84     Initial Evaluation Date: 8/28/19  POC Expiration: 10/17/19    Subjective     Pt reports that she doing well today and is not experiencing any pain today. She went to MD yesterday and he moved back surgery for 2 weeks.  Both knees bother her equally.     Objective       Range of Motion: Knee    RIGHT LEFT   Flexion: 90 95   Extension -12 -15        Kamla received individual therapeutic exercises to develop strength, endurance, ROM and flexibility for 55 minutes including:    Quad sets 2 minutes c/ 5" holds  SAQ 2 x 10  SLR 2 x 10  Glute sets 2 x 10 c/ 5" holds  Seated heel slides 20 x 5" hold-  Seated marches x 20 B  Seated heel/toe raises 3 x 10  Seated add squeezes 5" hold x 2 minutes  Seated clamshells c/ OTB x 20  LAQ 3 x 10  Seated HSS 2 x 30"  Seated knee extension stretch c/ foot on stool and 4# ankle weights above and below joint line x 2 min B  Gastroc wedge stretch 2 x 30"  Standing heel raises 2 x 10  Standing HS curls 2 x 10-  Step ups on airex x 10      Written Home Exercises Provided: at eval  Pt demo good understanding of the education provided. Kamla demonstrated good return demonstration of activities.     Education provided re: importance of improving strength and mobility  Kamla verbalized good understanding of education provided.   No spiritual or educational barriers to learning provided    Assessment     Pt able to tolerate all interventions well without any complaints of pain or discomfort.  Frequent " rest breaks required throughout session. Pt c/ increased B knee flexion compared to initial evaluation. Continue to progress based on pt's tolerance.  This is a 69 y.o. female referred to outpatient physical therapy and presents with a medical diagnosis of B knee OA and demonstrates limitations as described in the problem list. Pt prognosis is Good. Pt will continue to benefit from skilled outpatient physical therapy to address the deficits listed in the problem list, provide pt/family education and to maximize pt's level of independence in the home and community environment.     Goals:  Short Term Goals:   1. Increase B knee AROM -10-89 degrees , pain-free, within 3 weeks to restore normal functional mobility  2. Patient able to tolerate at least 5 minutes sustained standing/walking with B knee pain less than 6/10 within 3 weeks     Long Term Goals  1. Patient to report B knee pain less than 7/10 at all times within 6 weeks for improved celina to sustained activity  2. Patient able to tolerate at least 10 minutes sustained standing/walking with R knee pain less than 6/10 within 6 weeks to improve functional mobility  3. Increase B knee AROM to -5 to 100 degrees within 6 weeks to improve mobility prior to upcoming TKA's  4. Increase B LE strength to at least 4-/5 throughout within 8 weeks for improved endurance with sustained activity     Plan     Continue with established Plan of Care towards PT goals.    Therapist: Drew Dubose, PT,DPT  10/1/2019

## 2019-09-30 NOTE — PROGRESS NOTES
"Subjective:      Patient ID: Kamla Espinoza is a 69 y.o. female.    Chief Complaint: Pain of the Left Knee and Pain of the Right Knee    HPI  Kamla Espinoza has bilateral knee pain.  The pain has slightly improved with PT. The pain is located in the global aspect of the knees.  There  is not radiation.   There is not associated stiffness.   There is not catching and locking. The pain is described as achy. The pain is aggravated by activity.  It is alleviated by rest.  There is associated back pain.  Her history, medications and problem list were reviewed.  Past Medical History:   Diagnosis Date    Diabetes mellitus, type 2     High cholesterol     Hypertension        Review of Systems   Constitution: Negative for chills, fever and night sweats.   HENT: Negative for hearing loss.    Eyes: Negative for blurred vision and double vision.   Cardiovascular: Negative for chest pain, claudication and leg swelling.   Respiratory: Negative for shortness of breath.    Endocrine: Negative for polydipsia, polyphagia and polyuria.   Hematologic/Lymphatic: Negative for adenopathy and bleeding problem. Does not bruise/bleed easily.   Skin: Negative for poor wound healing.   Musculoskeletal: Positive for back pain and joint pain.   Gastrointestinal: Negative for diarrhea and heartburn.   Genitourinary: Negative for bladder incontinence.   Neurological: Negative for focal weakness, headaches, numbness, paresthesias and sensory change.   Psychiatric/Behavioral: The patient is not nervous/anxious.    Allergic/Immunologic: Negative for persistent infections.         Objective:      Body mass index is 43.85 kg/m².  Vitals:    09/30/19 1108   Weight: 107 kg (235 lb 14.3 oz)   Height: 5' 1.5" (1.562 m)           General    Constitutional: She is oriented to person, place, and time.   obese   HENT:   Head: Normocephalic and atraumatic.   Eyes: EOM are normal.   Cardiovascular: Normal rate and regular rhythm.    Pulmonary/Chest: " Effort normal.   Neurological: She is alert and oriented to person, place, and time.   Psychiatric: She has a normal mood and affect.     General Musculoskeletal Exam   Gait: normal       Right Knee Exam     Inspection   Erythema: absent  Scars: absent  Swelling: present  Effusion: absent  Deformity: absent  Bruising: absent    Tenderness   The patient is tender to palpation of the medial joint line.    Crepitus   The patient has crepitus of the patella.    Range of Motion   Extension: 5   Flexion: 90     Tests   Ligament Examination Lachman: normal (-1 to 2mm)   MCL - Valgus: normal (0 to 2mm)  LCL - Varus: normal  Patella   Passive Patellar Tilt: neutral    Other   Sensation: normal    Left Knee Exam     Inspection   Erythema: absent  Scars: absent  Swelling: present  Effusion: absent  Deformity: absent  Bruising: absent    Tenderness   The patient tender to palpation of the medial joint line.    Crepitus   The patient has crepitus of the patella.    Range of Motion   Extension: 5   Flexion: 90     Tests   Stability Lachman: normal (-1 to 2mm)   MCL - Valgus: normal (0 to 2mm)  LCL - Varus: normal (0 to 2mm)  Patella   Passive Patellar Tilt: neutral    Other   Sensation: normal    Muscle Strength   Right Lower Extremity   Hip Abduction: 5/5   Quadriceps:  5/5   Hamstrin/5   Left Lower Extremity   Hip Abduction: 5/5   Quadriceps:  5/5   Hamstrin/5     Reflexes     Left Side  Quadriceps:  2+    Right Side   Quadriceps:  2+    Vascular Exam     Right Pulses  Dorsalis Pedis:      2+          Left Pulses  Dorsalis Pedis:      2+          Edema  Right Lower Leg: absent  Left Lower Leg: absent              Assessment:       Encounter Diagnoses   Name Primary?    Primary osteoarthritis of both knees Yes    BMI 40.0-44.9, adult           Plan:       Kamla was seen today for pain and pain.    Diagnoses and all orders for this visit:    Primary osteoarthritis of both knees    BMI 40.0-44.9, adult      She is  seeing Dr. Gore, but her BMI is not really improving.  At this point I am moving the surgery back two weeks and will have latoya see Dr. Gore again in f/u to lower the BMI to minimize risk of postoperative complications.  New surgery date will be 11/19.  Will see her in 5 weeks to confirm BMI going in right direction.

## 2019-10-01 ENCOUNTER — OFFICE VISIT (OUTPATIENT)
Dept: BARIATRICS | Facility: CLINIC | Age: 69
End: 2019-10-01
Payer: MEDICARE

## 2019-10-01 ENCOUNTER — CLINICAL SUPPORT (OUTPATIENT)
Dept: REHABILITATION | Facility: HOSPITAL | Age: 69
End: 2019-10-01
Payer: MEDICARE

## 2019-10-01 VITALS
HEIGHT: 62 IN | SYSTOLIC BLOOD PRESSURE: 110 MMHG | BODY MASS INDEX: 43.69 KG/M2 | DIASTOLIC BLOOD PRESSURE: 50 MMHG | HEART RATE: 85 BPM | WEIGHT: 237.44 LBS

## 2019-10-01 DIAGNOSIS — M25.662 DECREASED RANGE OF MOTION (ROM) OF BOTH KNEES: ICD-10-CM

## 2019-10-01 DIAGNOSIS — M25.661 DECREASED RANGE OF MOTION (ROM) OF BOTH KNEES: ICD-10-CM

## 2019-10-01 DIAGNOSIS — G89.29 CHRONIC PAIN OF BOTH KNEES: ICD-10-CM

## 2019-10-01 DIAGNOSIS — M25.561 CHRONIC PAIN OF BOTH KNEES: ICD-10-CM

## 2019-10-01 DIAGNOSIS — M25.562 CHRONIC PAIN OF BOTH KNEES: ICD-10-CM

## 2019-10-01 DIAGNOSIS — E66.01 CLASS 3 SEVERE OBESITY DUE TO EXCESS CALORIES WITH SERIOUS COMORBIDITY AND BODY MASS INDEX (BMI) OF 40.0 TO 44.9 IN ADULT: Primary | ICD-10-CM

## 2019-10-01 DIAGNOSIS — R26.2 DIFFICULTY WALKING: ICD-10-CM

## 2019-10-01 PROCEDURE — 3078F PR MOST RECENT DIASTOLIC BLOOD PRESSURE < 80 MM HG: ICD-10-PCS | Mod: CPTII,S$GLB,, | Performed by: INTERNAL MEDICINE

## 2019-10-01 PROCEDURE — 99999 PR PBB SHADOW E&M-EST. PATIENT-LVL III: ICD-10-PCS | Mod: PBBFAC,,, | Performed by: INTERNAL MEDICINE

## 2019-10-01 PROCEDURE — 99213 PR OFFICE/OUTPT VISIT, EST, LEVL III, 20-29 MIN: ICD-10-PCS | Mod: S$GLB,,, | Performed by: INTERNAL MEDICINE

## 2019-10-01 PROCEDURE — 99999 PR PBB SHADOW E&M-EST. PATIENT-LVL III: CPT | Mod: PBBFAC,,, | Performed by: INTERNAL MEDICINE

## 2019-10-01 PROCEDURE — 97110 THERAPEUTIC EXERCISES: CPT | Mod: PO

## 2019-10-01 PROCEDURE — 3078F DIAST BP <80 MM HG: CPT | Mod: CPTII,S$GLB,, | Performed by: INTERNAL MEDICINE

## 2019-10-01 PROCEDURE — 3074F PR MOST RECENT SYSTOLIC BLOOD PRESSURE < 130 MM HG: ICD-10-PCS | Mod: CPTII,S$GLB,, | Performed by: INTERNAL MEDICINE

## 2019-10-01 PROCEDURE — 99213 OFFICE O/P EST LOW 20 MIN: CPT | Mod: S$GLB,,, | Performed by: INTERNAL MEDICINE

## 2019-10-01 PROCEDURE — 1101F PT FALLS ASSESS-DOCD LE1/YR: CPT | Mod: CPTII,S$GLB,, | Performed by: INTERNAL MEDICINE

## 2019-10-01 PROCEDURE — 3074F SYST BP LT 130 MM HG: CPT | Mod: CPTII,S$GLB,, | Performed by: INTERNAL MEDICINE

## 2019-10-01 PROCEDURE — 1101F PR PT FALLS ASSESS DOC 0-1 FALLS W/OUT INJ PAST YR: ICD-10-PCS | Mod: CPTII,S$GLB,, | Performed by: INTERNAL MEDICINE

## 2019-10-01 RX ORDER — TOPIRAMATE 50 MG/1
50 TABLET, FILM COATED ORAL 2 TIMES DAILY
Qty: 60 TABLET | Refills: 3 | Status: SHIPPED | OUTPATIENT
Start: 2019-10-01 | End: 2020-02-03 | Stop reason: SDUPTHER

## 2019-10-01 NOTE — PATIENT INSTRUCTIONS
Patient was informed that topiramate is used for migraine prevention and seizures. Weight loss is a common side effect that is well documented. S/he understands this. S/he was informed of the potential side effects such as serious and possibly fatal rash in which case the medication should be discontinued immediately. Paresthesias, forgetfulness, fatigue, kidney stones, GI symptoms, and changes in lab values such as electrolytes, blood counts and kidney function.      Increase topiramate to 50 mg BID.     Sit and Be Fit exercise program on [employer], [employer] or [employer] 3-4 times a week this month       5-Day Menu Plan: 800-1000 Calories    DAY 1     Breakfast  1 boiled egg  Small apple    Snack  ¼ cup almonds    Lunch  Morning star lizz  1 cup green beans    Dinner  3 oz salmon  1 cup cooked carrots    Snack  SF jello    DAY 2    Breakfast  2 eggs with 2 tbsp salsa    Snack  6 oz greek yogurt  ½ cup Peaches canned (in its own juice)    Lunch  [place]:Tuna and mustard   Pear cup (no sugar added)    Dinner  Baked fish  1 cup cooked yellow squash    Snack   popsicle            DAY 3    Breakfast   Protein drink: 1 scoop whey powder + 6oz soy milk    Snack  ¼ cup almonds    Lunch  Tuna Salad: 3oz canned tuna in water, 1 egg, and 1 tsp light rodney)  Pineapple cup (no sugar added)    Dinner  Baked shrimp  1 cup grilled eggplant    Snack  8oz Chocolate Soy Milk      DAY 4    Breakfast  2 eggs, morning star lizz    Snack  1 cheese stick    Lunch    Snack  1/4 cup almonds  Peach cup (no sugar added)    Dinner  3oz baked fish  1 cup cooked green beans          DAY 5    Breakfast  Protein drink: 1 scoop whey powder + 6oz soy milk    Snack   ¼ cup almonds  1 apple    Lunch  1 cup tuna salad: (3oz canned tuna in water, 1 egg, 1 tsp light rodney)    Snack  3 oz greek yogurt  Fruit cup (no sugar added)    Dinner  Omelet: 2 eggs, grilled shrimp, bell pepper and onion      Eating well to be healthy and lose weight does not have to be  hard. It also does not have to be time consuming or expensive. There a lots of ways you can work in healthy choices into your day. Many of these are easy, quick and even family friendly!    Homemade hazelnut au lait  Brew your favorite brand of hazelnut flavored coffee ([employer] makes a good one). Microwave 1/2 cup of milk that fits your eating plan (whole, skim or sugar-free almond milk can all work). Add half to 1 oz sugar free hazelnut syrup.     Quick and easy breakfast  1-2 boiled eggs or mini-frittatas with a tangerine. The boiled eggs and mini-frittatas can both be made ahead and last for up to 4 days in the refrigerator. Bonus if you portion them out in ready to go containers or zipper bags.     Breakfast Egg Muffins with Finney and Spinach  Makes 12 muffins  Ingredients    6 eggs  ¼ cup milk  ¼ teaspoon salt  2 cups grated cheddar cheese  3/4 cup spinach, cooked and drained (about 8 oz fresh spinach)  6 finney slices, cooked, drained of fat, and chopped  1/2 cup grated Parmesan cheese (optional)    Instructions      Preheat oven to 350 degrees. Use a regular 12-cup muffin pan. Spray the muffin pan with non-stick cooking spray.  In a large bowl, beat eggs until smooth. Add milk, salt, Cheddar cheese and mix. Stir spinach, cooked finney into the egg mixture. Ladle the egg mixture into greased muffin cups ¾ full.  Top each muffin cup with grated Parmesan cheese.  Bake for 25 minutes. Remove from the oven, let the muffins cool for 30 minutes before removing them from the pan.      Be a brown bagger! When you make dinner, plan for an extra helping. When you serve your plate for dinner, serve an additional helping into a container that you can take with you the next day. If you don't have a refrigerator available during the day, an insulated lunch bag and ice packs will help you safely store you lunch.     Cold Brewed Iced tea. Fill a pitcher with 64 oz filtered water. Add either 4 regular tea bags of your choice  or a large iced tea bag. Refrigerate over night then remove the tea bags. The tea will not be bitter and is super flavorful. Get creative! Try combinations like green tea and hibiscus tea or black tea with lemon zinger. Add orange or lemon slices for even more flavor.     Snack wisely. Protein filled snacks will fill you up, allowing you to get by with fewer calories. String cheese, pork skins (chicharrones), turkey pepperoni, or celery with cream cheese will all fit the bill.       Ditch the take out. Turkey tacos (with or without a low carb tortilla), burgers (without the bun), or fun stir fries are all quick and easy. The whole family will be happy, and you can save calories and money.      Orange Chicken Stir lovett with asparagus   Makes 6 servings  Ingredients:    1.5 lbs boneless skinless chicken breast/tenders, diced into 1-inch pieces  1 Tbsp extra virgin olive or avocado oil, divided  2 lb asparagus, end portions trimmed and remainder diced into 1 1/2-inch pieces  1 small yellow onion, sliced into thin strips  8 oz button mushrooms, sliced  1 Tbsp peeled and finely grated fresh francesco  4 cloves garlic, minced  1/2 cup low-sodium chicken broth  Juice of 2 fresh oranges  2 Tbsp low sodium soy sauce  2 Tbsp cornstarch  Sea salt and freshly ground black pepper    Directions:    In a 12-inch non-stick wok, heat 1/2 oil over moderately high heat. Once oil is hot, add diced chicken and season lightly with salt and pepper. Sauté until cooked through, tossing occasionally, about 5-6 minutes.  Place chicken on a large plate and set aside. Return wok, reduce to medium-high heat, add remaining oil.  Once oil is hot, add asparagus, yellow onion and mushrooms, and sauté until tender-crisp, about 4 - 5 minutes, adding in garlic and francesco during the last 1 minute of sautéing.  Meanwhile, in a mixing bowl whisk together chicken broth, orange juice, soy sauce and cornstarch until well blended.  Pour chicken broth mixture into  skillet with veggies, season with salt and pepper to taste, and bring mixture to a light boil, stirring constantly. Allow mixture to gently boil, stirring constantly, until thickened, about 1 minute.  Toss chicken into mixture and serve immediately over cauliflower rice or Shirataki noodles.      Skinny Chicken Tortilla Soup  Makes 7 servings    2 teaspoons olive oil  1 cup onion, chopped (about 1 small)  2 cups celery, sliced (about 4 medium stalks)  4 garlic cloves, minced  4 medium tomatoes, chopped  2 cups water  4 cups low-sodium organic chicken broth  3 cups chopped and/or shredded rotisserie chicken, skinless  2 cups sliced carrots (about 3 medium)  1 teaspoon dried oregano leaves  2 teaspoons chili powder  1 teaspoon garlic powder  2 teaspoons cumin  ½ teaspoon cayenne pepper (add less or omit, if you don't want a spicy soup)  ½ teaspoon sea salt + more to taste  ½ teaspoon pepper + more to taste    Directions:   Put all ingredients into a large crock pot. Cook on low for 5-6 hours.     Optional garnish with chopped avocado, chopped fresh cilantro, crumbled Cotija cheese, sour cream, Greek yogurt, your favorite hot sauce.           Vegan Avocado Banana Chocolate Pudding  Makes 4 servings  Ingredients    1 1/2 ripe avocados  2 ripe bananas  6 tbsp raw cacao powder or unsweetened cocoa powder  2-3 tbsp maple syrup (or calorie free sweetener)  1/4 cup almond milk  Instructions    Blend everything together in a  until the consistency is smooth and velvety. Taste and see if more sweetener is needed and stir to make sure everything is evenly mixed. Blend a second time if needed.  Top with banana slices, raw cacao nibs, almond butter, or any other toppings and enjoy!

## 2019-10-01 NOTE — PROGRESS NOTES
"Subjective:       Patient ID: Kamla Espinoza is a 69 y.o. female.    Chief Complaint: Follow-up    Pt here today for follow-up. Has lost 11 lbs. Started 800-100 tayler diet and topiramate. States she has been eating more vegetables and less starches. She Denies SE. Does feel appetite is down. Doing PT 2 times a week.     B: BOiled egg and fruit. . Cereal and LF milk.   L and D: Salad. Chicken and brocoli.  Sn: Fruit  Dr: Water, tea and coffee with AS.         Follow-up   Associated symptoms include arthralgias. Pertinent negatives include no chest pain, chills or fever.     Review of Systems   Constitutional: Negative for chills and fever.   Respiratory: Negative for shortness of breath.         + snores   Cardiovascular: Positive for leg swelling. Negative for chest pain.   Gastrointestinal: Negative for constipation and diarrhea.        + gerd   Genitourinary: Positive for frequency. Negative for difficulty urinating and dysuria.   Musculoskeletal: Positive for arthralgias and back pain.   Neurological: Negative for dizziness and light-headedness.   Psychiatric/Behavioral: Negative for dysphoric mood. The patient is not nervous/anxious.        Objective:     BP (!) 110/50   Pulse 85   Ht 5' 1.5" (1.562 m)   Wt 107.7 kg (237 lb 7 oz)   BMI 44.14 kg/m²    Physical Exam   Constitutional: She is oriented to person, place, and time. She appears well-developed. No distress.   Morbidly obese     HENT:   Head: Normocephalic and atraumatic.   Eyes: Pupils are equal, round, and reactive to light. EOM are normal. No scleral icterus.   Neck: Normal range of motion. Neck supple. No thyromegaly present.   Cardiovascular: Normal rate and normal heart sounds. Exam reveals no gallop and no friction rub.   No murmur heard.  Pulmonary/Chest: Effort normal and breath sounds normal. No respiratory distress. She has no wheezes.   Abdominal: Soft. Bowel sounds are normal. She exhibits no distension. There is no tenderness. "   Musculoskeletal: Normal range of motion. She exhibits no edema.   Lymphadenopathy:     She has no cervical adenopathy.   Neurological: She is alert and oriented to person, place, and time. No cranial nerve deficit.   Skin: Skin is warm and dry. No erythema.   Psychiatric: She has a normal mood and affect. Her behavior is normal. Judgment normal.   Vitals reviewed.      Assessment:       1. Class 3 severe obesity due to excess calories with serious comorbidity and body mass index (BMI) of 40.0 to 44.9 in adult        Plan:         Kamla was seen today for follow-up.    Diagnoses and all orders for this visit:    Class 3 severe obesity due to excess calories with serious comorbidity and body mass index (BMI) of 40.0 to 44.9 in adult    Other orders  -     topiramate (TOPAMAX) 50 MG tablet; Take 1 tablet (50 mg total) by mouth 2 (two) times daily.      Patient was informed that topiramate is used for migraine prevention and seizures. Weight loss is a common side effect that is well documented. S/he understands this. S/he was informed of the potential side effects such as serious and possibly fatal rash in which case the medication should be discontinued immediately. Paresthesias, forgetfulness, fatigue, kidney stones, GI symptoms, and changes in lab values such as electrolytes, blood counts and kidney function.      Increase topiramate to 50 mg BID.     Sit and Be Fit exercise program on [employer], [employer] or youtube 3-4 times a week this month     800-1000 tayler pescetarian menu and healthy all day tips given.

## 2019-10-07 NOTE — PROGRESS NOTES
"                                                    Physical Therapy Daily Note     Name: Kamla Espinoza  Clinic Number: [id number]  Diagnosis:   Encounter Diagnoses   Name Primary?    Chronic pain of both knees     Difficulty walking     Decreased range of motion (ROM) of both knees      Physician: Rafafele Saucedo MD  Precautions: diabetes and standard  Visit #: 7 of 20    Time In: 9:59  Time Out: 10:59  1:1 treatment time: 30 minutes    Visit amount: 60.64  Total amount: ~567.48     Initial Evaluation Date: 8/28/19  POC Expiration: 10/17/19    Subjective     Pt reports that her knees are feeling good right now.  She is not in any pain.    Objective       Range of Motion: Knee    RIGHT LEFT   Flexion: 90 95   Extension -12 -15        Kamla received individual therapeutic exercises to develop strength, endurance, ROM and flexibility for 60 minutes including:    Quad sets 2 minutes c/ 5" holds  SAQ 2 x 10  SLR 2 x 10  Glute sets 2 x 10 c/ 5" holds  Seated heel slides 20 x 5" hold  Seated marches x 20 B  Seated heel/toe raises 3 x 10  Seated add squeezes 5" hold x 2 minutes  Seated clamshells c/ OTB x 20  LAQ 3 x 10  Seated HSS 2 x 30"  Seated knee extension stretch c/ foot on stool and 4# ankle weights above and below joint line x 2 min B  Gastroc wedge stretch 2 x 30"  Standing heel raises 2 x 10  Standing HS curls 2 x 10-  Step ups on airex x 10      Written Home Exercises Provided: at eval  Pt demo good understanding of the education provided. Kamla demonstrated good return demonstration of activities.     Education provided re: importance of improving strength and mobility  Kamla verbalized good understanding of education provided.   No spiritual or educational barriers to learning provided    Assessment     Pt able to tolerate all interventions well without any complaints of pain or discomfort. No new exercises added today.  Verbal and tactile cues required for proper technique.  Frequent rest " breaks required throughout session. Continue to progress based on pt's tolerance.    This is a 69 y.o. female referred to outpatient physical therapy and presents with a medical diagnosis of B knee OA and demonstrates limitations as described in the problem list. Pt prognosis is Good. Pt will continue to benefit from skilled outpatient physical therapy to address the deficits listed in the problem list, provide pt/family education and to maximize pt's level of independence in the home and community environment.     Goals:  Short Term Goals:   1. Increase B knee AROM -10-89 degrees , pain-free, within 3 weeks to restore normal functional mobility  2. Patient able to tolerate at least 5 minutes sustained standing/walking with B knee pain less than 6/10 within 3 weeks     Long Term Goals  1. Patient to report B knee pain less than 7/10 at all times within 6 weeks for improved celina to sustained activity  2. Patient able to tolerate at least 10 minutes sustained standing/walking with R knee pain less than 6/10 within 6 weeks to improve functional mobility  3. Increase B knee AROM to -5 to 100 degrees within 6 weeks to improve mobility prior to upcoming TKA's  4. Increase B LE strength to at least 4-/5 throughout within 8 weeks for improved endurance with sustained activity     Plan     Continue with established Plan of Care towards PT goals.    Therapist: Drew Dubose, PT,DPT  10/8/2019

## 2019-10-08 ENCOUNTER — CLINICAL SUPPORT (OUTPATIENT)
Dept: REHABILITATION | Facility: HOSPITAL | Age: 69
End: 2019-10-08
Payer: MEDICARE

## 2019-10-08 DIAGNOSIS — M25.562 CHRONIC PAIN OF BOTH KNEES: ICD-10-CM

## 2019-10-08 DIAGNOSIS — G89.29 CHRONIC PAIN OF BOTH KNEES: ICD-10-CM

## 2019-10-08 DIAGNOSIS — R26.2 DIFFICULTY WALKING: ICD-10-CM

## 2019-10-08 DIAGNOSIS — M25.662 DECREASED RANGE OF MOTION (ROM) OF BOTH KNEES: ICD-10-CM

## 2019-10-08 DIAGNOSIS — M25.661 DECREASED RANGE OF MOTION (ROM) OF BOTH KNEES: ICD-10-CM

## 2019-10-08 DIAGNOSIS — M25.561 CHRONIC PAIN OF BOTH KNEES: ICD-10-CM

## 2019-10-08 PROCEDURE — 97110 THERAPEUTIC EXERCISES: CPT | Mod: PO

## 2019-10-11 ENCOUNTER — CLINICAL SUPPORT (OUTPATIENT)
Dept: REHABILITATION | Facility: HOSPITAL | Age: 69
End: 2019-10-11
Payer: MEDICARE

## 2019-10-11 DIAGNOSIS — R26.2 DIFFICULTY WALKING: ICD-10-CM

## 2019-10-11 DIAGNOSIS — M25.561 CHRONIC PAIN OF BOTH KNEES: ICD-10-CM

## 2019-10-11 DIAGNOSIS — M25.661 DECREASED RANGE OF MOTION (ROM) OF BOTH KNEES: ICD-10-CM

## 2019-10-11 DIAGNOSIS — M25.562 CHRONIC PAIN OF BOTH KNEES: ICD-10-CM

## 2019-10-11 DIAGNOSIS — M25.662 DECREASED RANGE OF MOTION (ROM) OF BOTH KNEES: ICD-10-CM

## 2019-10-11 DIAGNOSIS — G89.29 CHRONIC PAIN OF BOTH KNEES: ICD-10-CM

## 2019-10-11 PROCEDURE — 97110 THERAPEUTIC EXERCISES: CPT | Mod: PO

## 2019-10-11 NOTE — PROGRESS NOTES
"                                                    Physical Therapy Daily Note     Name: Kamla Espinoza  Clinic Number: [id number]  Diagnosis:   Encounter Diagnoses   Name Primary?    Chronic pain of both knees     Difficulty walking     Decreased range of motion (ROM) of both knees      Physician: Raffaele Saucedo MD  Precautions: diabetes and standard  Visit #: 8 of 20    Time In: 9:00  Time Out: 9:50  1:1 treatment time: 25 minutes    Visit amount: 60.64  Total amount: ~628.12     Initial Evaluation Date: 8/28/19  POC Expiration: 10/17/19    Subjective     Pt reports that her knees are feeling good right now.  She is not in any pain.    Objective       Range of Motion: Knee    RIGHT LEFT   Flexion: 90 95   Extension -12 -15        Kamla received individual therapeutic exercises to develop strength, endurance, ROM and flexibility for 50 minutes including:    Quad sets 2 minutes c/ 5" holds  SAQ 2 x 10 c/ 2# ankle weights  SLR 2 x 10 c/ 2# ankle weights  Glute sets 2 x 10 c/ 5" holds  Seated heel slides 20 x 5" hold  Seated marches x 20 B c/ 2# ankle weights  Seated heel/toe raises 3 x 10  Seated add squeezes 5" hold x 2 minutes  Seated clamshells c/ OTB x 20  LAQ 3 x 10 c/ 2# ankle weights  Seated HSS 3 x 30"  Seated knee extension stretch c/ foot on stool and 4# ankle weights above and below joint line x 2 min B  Gastroc wedge stretch 2 x 30"  Standing heel raises 2 x 10  Standing HS curls 2 x 10  Step ups on airex x 20  Steamboats 1 x 5 B      Written Home Exercises Provided: at eval  Pt demo good understanding of the education provided. Kamla demonstrated good return demonstration of activities.     Education provided re: importance of improving strength and mobility  Kamla verbalized good understanding of education provided.   No spiritual or educational barriers to learning provided    Assessment     Pt able to tolerate all interventions well without any complaints of pain or discomfort. Pt able " to tolerate increased repetitions and resistance at today's session.  Verbal and tactile cues required for proper technique.  Frequent rest breaks required throughout session. Continue to progress based on pt's tolerance.    This is a 69 y.o. female referred to outpatient physical therapy and presents with a medical diagnosis of B knee OA and demonstrates limitations as described in the problem list. Pt prognosis is Good. Pt will continue to benefit from skilled outpatient physical therapy to address the deficits listed in the problem list, provide pt/family education and to maximize pt's level of independence in the home and community environment.     Goals:  Short Term Goals:   1. Increase B knee AROM -10-89 degrees , pain-free, within 3 weeks to restore normal functional mobility  2. Patient able to tolerate at least 5 minutes sustained standing/walking with B knee pain less than 6/10 within 3 weeks     Long Term Goals  1. Patient to report B knee pain less than 7/10 at all times within 6 weeks for improved celina to sustained activity  2. Patient able to tolerate at least 10 minutes sustained standing/walking with R knee pain less than 6/10 within 6 weeks to improve functional mobility  3. Increase B knee AROM to -5 to 100 degrees within 6 weeks to improve mobility prior to upcoming TKA's  4. Increase B LE strength to at least 4-/5 throughout within 8 weeks for improved endurance with sustained activity     Plan     Continue with established Plan of Care towards PT goals.    Therapist: Drew Dubose, PT,DPT  10/11/2019

## 2019-10-24 ENCOUNTER — CLINICAL SUPPORT (OUTPATIENT)
Dept: REHABILITATION | Facility: HOSPITAL | Age: 69
End: 2019-10-24
Payer: MEDICARE

## 2019-10-24 DIAGNOSIS — M25.661 DECREASED RANGE OF MOTION (ROM) OF BOTH KNEES: ICD-10-CM

## 2019-10-24 DIAGNOSIS — M25.562 CHRONIC PAIN OF BOTH KNEES: ICD-10-CM

## 2019-10-24 DIAGNOSIS — R26.2 DIFFICULTY WALKING: ICD-10-CM

## 2019-10-24 DIAGNOSIS — M25.561 CHRONIC PAIN OF BOTH KNEES: ICD-10-CM

## 2019-10-24 DIAGNOSIS — M25.662 DECREASED RANGE OF MOTION (ROM) OF BOTH KNEES: ICD-10-CM

## 2019-10-24 DIAGNOSIS — G89.29 CHRONIC PAIN OF BOTH KNEES: ICD-10-CM

## 2019-10-24 PROCEDURE — 97110 THERAPEUTIC EXERCISES: CPT | Mod: PO

## 2019-10-24 NOTE — PLAN OF CARE
"                                                    Physical Therapy Daily Note/ Plan of Care     Name: Kamla Espinoza  Clinic Number: [id number]  Diagnosis:   Encounter Diagnoses   Name Primary?    Chronic pain of both knees     Difficulty walking     Decreased range of motion (ROM) of both knees      Physician: Raffaele Saucedo MD  Precautions: diabetes and standard  Visit #: 9 of 20    Time In: 2:55 PM  Time Out: 3:55 PM  1:1 treatment time: 60 minutes    Visit amount: 121.28  Total amount: ~749.4     Initial Evaluation Date: 8/28/19  POC Expiration: 10/17/19, 11/19/19    Subjective     Pt reports that her knees are feeling good right now.  She is not in any pain.    Objective       Range of Motion: Knee    RIGHT LEFT   Flexion: 90 95   Extension -10 -10      Strength: Knee and Ankle    RIGHT  LIEFT   Quadriceps  Knee Extension 4/5 4/5   Hamstrings  Knee Flexion 4/5 4/5   Gastroc/Soleus  Plantarflexion 4/5 4/5   Anterior Tibialis  Dorsiflexion    4+/5 4+/5         Strength: Hip     RIGHT  LEFT   Hip Flexion 4-/5 4-/5   Abduction 4-/5 4-/5   IR 4-/5 4-/5   ER 4/5 4/5   Ext 3+/5 3+/5         Kamla received individual therapeutic exercises and assessment to develop strength, endurance, ROM and flexibility for 60 minutes including:    Quad sets 2 minutes c/ 5" holds  SAQ 2 x 10 c/ 2# ankle weights  SLR 2 x 10 c/ 2# ankle weights  Glute sets 2 x 10 c/ 5" holds  Seated heel slides 20 x 5" hold  Seated marches x 20 B c/ 2# ankle weights  Seated heel/toe raises 3 x 10  Seated add squeezes 5" hold x 2 minutes  Seated clamshells c/ OTB x 20  LAQ 3 x 10 c/ 2# ankle weights  Seated HSS 3 x 30"  Seated knee extension stretch c/ foot on stool and 4# ankle weights above and below joint line x 2 min B  Gastroc wedge stretch 2 x 30"  Standing heel raises 2 x 10 c/ 2# ankle weights  Standing HS curls 2 x 10 c/ 2# ankle weights  Step ups on airex x 20  Steamboats 1 x 10 B c/ 2# ankle weights      Written Home Exercises " Provided: at al  Pt demo good understanding of the education provided. Kamla demonstrated good return demonstration of activities.     Education provided re: importance of improving strength and mobility  Kamla verbalized good understanding of education provided.   No spiritual or educational barriers to learning provided    Assessment     Pt able to tolerate all interventions well without any complaints of pain or discomfort. Pt c/ improved BLE strength and knee ROM, however remains significantly limited c/ both.  Pt will continue to benefit from outpatient PT until surgery date. Continue to progress based on pt's tolerance.    This is a 69 y.o. female referred to outpatient physical therapy and presents with a medical diagnosis of B knee OA and demonstrates limitations as described in the problem list. Pt prognosis is Good. Pt will continue to benefit from skilled outpatient physical therapy to address the deficits listed in the problem list, provide pt/family education and to maximize pt's level of independence in the home and community environment.     Goals:  Short Term Goals:   1. Increase B knee AROM -10-89 degrees , pain-free, within 3 weeks to restore normal functional mobility (Met 10/24/19)  2. Patient able to tolerate at least 5 minutes sustained standing/walking with B knee pain less than 6/10 within 3 weeks  (Met 10/24/19)     Long Term Goals  1. Patient to report B knee pain less than 7/10 at all times within 6 weeks for improved celina to sustained activity (Met 10/24/19)  2. Patient able to tolerate at least 10 minutes sustained standing/walking with R knee pain less than 6/10 within 6 weeks to improve functional mobility (Not Met)  3. Increase B knee AROM to -5 to 100 degrees within 6 weeks to improve mobility prior to upcoming TKA's( Not Met)  4. Increase B LE strength to at least 4-/5 throughout within 8 weeks for improved endurance with sustained activity (Not Met)     Plan     Extend plan of  care for an additional 2x/wk until time of surgery on 11/19/19    Therapist: Drew Dubose PT,DPT  10/24/2019

## 2019-10-29 NOTE — PROGRESS NOTES
"                                                    Physical Therapy Daily Note     Name: Kamla Espinoza  Clinic Number: [id number]  Diagnosis:   Encounter Diagnoses   Name Primary?    Chronic pain of both knees     Difficulty walking     Decreased range of motion (ROM) of both knees      Physician: Raffaele Saucedo MD  Precautions: diabetes and standard  Visit #: 10 of 20    Time In: 1:10 (pt arrived late)  Time Out: 2:00  1:1 treatment time: 30 minutes    Visit amount: 60.64  Total amount: ~810.04     Initial Evaluation Date: 8/28/19  POC Expiration: 10/17/19, 11/19/19    Subjective     Pt reports that her knees are feeling good right now.  She is not in any pain.    Objective       Kamla received individual therapeutic exercises to develop strength, endurance, ROM and flexibility for 50 minutes including:    Quad sets 2 minutes c/ 5" holds-  SAQ 2 x 10 c/ 2# ankle weights  SLR 2 x 10 c/ 2# ankle weights  Glute sets 2 x 10 c/ 5" holds-  SL clamshells 3 x 10  Seated heel slides 20 x 5" hold  Seated marches x 20 B c/ 2# ankle weights-  Seated heel/toe raises 3 x 10  Seated add squeezes 5" hold x 2 minutes  Seated clamshells c/ OTB x 20  LAQ 3 x 10 c/ 2# ankle weights  Seated HSS 3 x 30"  Seated knee extension stretch c/ foot on stool and 4# ankle weights above and below joint line x 2 min B  Gastroc wedge stretch 2 x 30"  Standing heel raises 3 x 10 c/ 2# ankle weights  Standing HS curls 2 x 10 c/ 2# ankle weights  Step ups on airex x 20  Steamboats 2 x 10 B c/ 2# ankle weights      Written Home Exercises Provided: at eval  Pt demo good understanding of the education provided. Kamla demonstrated good return demonstration of activities.     Education provided re: importance of improving strength and mobility  Kamla verbalized good understanding of education provided.   No spiritual or educational barriers to learning provided    Assessment     Pt able to tolerate all interventions well without any " complaints of pain or discomfort. Pt c/ improved BLE strength and knee ROM, however remains significantly limited c/ both. PT added sidelying clamshells to focus on hip strengthening.  Pt will continue to benefit from outpatient PT until surgery date. Continue to progress based on pt's tolerance.    This is a 69 y.o. female referred to outpatient physical therapy and presents with a medical diagnosis of B knee OA and demonstrates limitations as described in the problem list. Pt prognosis is Good. Pt will continue to benefit from skilled outpatient physical therapy to address the deficits listed in the problem list, provide pt/family education and to maximize pt's level of independence in the home and community environment.     Goals:  Short Term Goals:   1. Increase B knee AROM -10-89 degrees , pain-free, within 3 weeks to restore normal functional mobility (Met 10/24/19)  2. Patient able to tolerate at least 5 minutes sustained standing/walking with B knee pain less than 6/10 within 3 weeks  (Met 10/24/19)     Long Term Goals  1. Patient to report B knee pain less than 7/10 at all times within 6 weeks for improved celina to sustained activity (Met 10/24/19)  2. Patient able to tolerate at least 10 minutes sustained standing/walking with R knee pain less than 6/10 within 6 weeks to improve functional mobility (Not Met)  3. Increase B knee AROM to -5 to 100 degrees within 6 weeks to improve mobility prior to upcoming TKA's( Not Met)  4. Increase B LE strength to at least 4-/5 throughout within 8 weeks for improved endurance with sustained activity (Not Met)    LEFS score: 48.75% limitation     Plan     Extend plan of care for an additional 2x/wk until time of surgery on 11/19/19    Therapist: Drew Dubose, PT,DPT  10/30/2019

## 2019-10-30 ENCOUNTER — CLINICAL SUPPORT (OUTPATIENT)
Dept: REHABILITATION | Facility: HOSPITAL | Age: 69
End: 2019-10-30
Payer: MEDICARE

## 2019-10-30 DIAGNOSIS — M25.561 CHRONIC PAIN OF BOTH KNEES: ICD-10-CM

## 2019-10-30 DIAGNOSIS — M25.661 DECREASED RANGE OF MOTION (ROM) OF BOTH KNEES: ICD-10-CM

## 2019-10-30 DIAGNOSIS — M25.562 CHRONIC PAIN OF BOTH KNEES: ICD-10-CM

## 2019-10-30 DIAGNOSIS — R26.2 DIFFICULTY WALKING: ICD-10-CM

## 2019-10-30 DIAGNOSIS — G89.29 CHRONIC PAIN OF BOTH KNEES: ICD-10-CM

## 2019-10-30 DIAGNOSIS — M25.662 DECREASED RANGE OF MOTION (ROM) OF BOTH KNEES: ICD-10-CM

## 2019-10-30 PROCEDURE — 97110 THERAPEUTIC EXERCISES: CPT | Mod: PO

## 2019-10-30 PROCEDURE — G8978 MOBILITY CURRENT STATUS: HCPCS | Mod: CK,PO

## 2019-10-30 PROCEDURE — G8979 MOBILITY GOAL STATUS: HCPCS | Mod: CK,PO

## 2019-11-04 ENCOUNTER — OFFICE VISIT (OUTPATIENT)
Dept: ORTHOPEDICS | Facility: CLINIC | Age: 69
End: 2019-11-04
Payer: MEDICARE

## 2019-11-04 VITALS — HEIGHT: 62 IN | BODY MASS INDEX: 42.84 KG/M2 | WEIGHT: 232.81 LBS

## 2019-11-04 DIAGNOSIS — E66.01 MORBID OBESITY WITH BMI OF 40.0-44.9, ADULT: ICD-10-CM

## 2019-11-04 DIAGNOSIS — R73.9 HYPERGLYCEMIA: ICD-10-CM

## 2019-11-04 DIAGNOSIS — M17.11 PRIMARY OSTEOARTHRITIS OF RIGHT KNEE: ICD-10-CM

## 2019-11-04 DIAGNOSIS — M17.12 PRIMARY OSTEOARTHRITIS OF LEFT KNEE: Primary | ICD-10-CM

## 2019-11-04 PROCEDURE — 99999 PR PBB SHADOW E&M-EST. PATIENT-LVL III: ICD-10-PCS | Mod: PBBFAC,,, | Performed by: ORTHOPAEDIC SURGERY

## 2019-11-04 PROCEDURE — 1101F PR PT FALLS ASSESS DOC 0-1 FALLS W/OUT INJ PAST YR: ICD-10-PCS | Mod: CPTII,S$GLB,, | Performed by: ORTHOPAEDIC SURGERY

## 2019-11-04 PROCEDURE — 99213 PR OFFICE/OUTPT VISIT, EST, LEVL III, 20-29 MIN: ICD-10-PCS | Mod: S$GLB,,, | Performed by: ORTHOPAEDIC SURGERY

## 2019-11-04 PROCEDURE — 99213 OFFICE O/P EST LOW 20 MIN: CPT | Mod: S$GLB,,, | Performed by: ORTHOPAEDIC SURGERY

## 2019-11-04 PROCEDURE — 99999 PR PBB SHADOW E&M-EST. PATIENT-LVL III: CPT | Mod: PBBFAC,,, | Performed by: ORTHOPAEDIC SURGERY

## 2019-11-04 PROCEDURE — 1101F PT FALLS ASSESS-DOCD LE1/YR: CPT | Mod: CPTII,S$GLB,, | Performed by: ORTHOPAEDIC SURGERY

## 2019-11-04 NOTE — PROGRESS NOTES
"Subjective:      Patient ID: Kamla Espinoza is a 69 y.o. female.    Chief Complaint: Pain of the Right Knee and Pain of the Left Knee    HPI  Kamla Espinoza has bilateral knee pain.  Left worse than right. The pain is unchanged. The symptoms have worsened to the point where it is interfering with her activities of daily living.  She has difficulty with stairs, getting dressed and getting in an out of a car.  The pain is located in the global aspect of the knee.  There  is not radiation.   There is associated stiffness.   There is catching and locking. The pain is described as achy. The pain is aggravated by activity.  It is alleviated by rest.  There is associated back pain.  Her history, medications and problem list were reviewed.     Review of Systems   Constitution: Negative for chills, fever and night sweats.   HENT: Negative for hearing loss.    Eyes: Negative for blurred vision and double vision.   Cardiovascular: Negative for chest pain, claudication and leg swelling.   Respiratory: Negative for shortness of breath.    Endocrine: Negative for polydipsia, polyphagia and polyuria.   Hematologic/Lymphatic: Negative for adenopathy and bleeding problem. Does not bruise/bleed easily.   Skin: Negative for poor wound healing.   Musculoskeletal: Positive for joint pain.   Gastrointestinal: Negative for diarrhea and heartburn.   Genitourinary: Negative for bladder incontinence.   Neurological: Negative for focal weakness, headaches, numbness, paresthesias and sensory change.   Psychiatric/Behavioral: The patient is not nervous/anxious.    Allergic/Immunologic: Negative for persistent infections.         Objective:      Body mass index is 43.28 kg/m².  Vitals:    11/04/19 1350   Weight: 105.6 kg (232 lb 12.9 oz)   Height: 5' 1.5" (1.562 m)           General    Constitutional: She is oriented to person, place, and time. She appears well-developed and well-nourished.   HENT:   Head: Normocephalic and atraumatic. "   Eyes: EOM are normal.   Cardiovascular: Normal rate.    Pulmonary/Chest: Effort normal.   Neurological: She is alert and oriented to person, place, and time.   Psychiatric: She has a normal mood and affect. Her behavior is normal.     General Musculoskeletal Exam   Gait: abnormal and antalgic       Right Knee Exam     Inspection   Erythema: absent  Scars: absent  Swelling: present  Effusion: absent  Deformity: present (varus)  Bruising: absent    Tenderness   The patient is tender to palpation of the medial joint line and lateral joint line.    Crepitus   The patient has crepitus of the patella.    Range of Motion   Extension: 0   Flexion: 110     Tests   Ligament Examination Lachman: normal (-1 to 2mm)   MCL - Valgus: normal (0 to 2mm)  LCL - Varus: normal  Patella   Passive Patellar Tilt: neutral    Other   Sensation: normal    Left Knee Exam     Inspection   Erythema: absent  Scars: absent  Swelling: present  Effusion: absent  Deformity: present (varus)  Bruising: absent    Tenderness   The patient tender to palpation of the medial joint line and lateral joint line.    Crepitus   The patient has crepitus of the patella.    Range of Motion   Extension: 0   Flexion: 100     Tests   Stability Lachman: normal (-1 to 2mm)   MCL - Valgus: normal (0 to 2mm)  LCL - Varus: normal (0 to 2mm)  Patella   Passive Patellar Tilt: neutral    Other   Sensation: normal    Muscle Strength   Right Lower Extremity   Hip Abduction: 5/5   Quadriceps:  5/5   Hamstrin/5   Left Lower Extremity   Hip Abduction: 5/5   Quadriceps:  5/5   Hamstrin/5     Reflexes     Left Side  Quadriceps:  2+    Right Side   Quadriceps:  2+    Vascular Exam     Right Pulses  Dorsalis Pedis:      2+          Left Pulses  Dorsalis Pedis:      2+          Edema  Right Lower Leg: absent  Left Lower Leg: absent                Assessment:       Encounter Diagnoses   Name Primary?    Primary osteoarthritis of left knee Yes    Primary osteoarthritis of  right knee     Morbid obesity with BMI of 40.0-44.9, adult     Hyperglycemia           Plan:       Kamla was seen today for pain and pain.    Diagnoses and all orders for this visit:    Primary osteoarthritis of left knee  -     Hemoglobin A1c; Future    Primary osteoarthritis of right knee  -     Hemoglobin A1c; Future    Morbid obesity with BMI of 40.0-44.9, adult  -     Hemoglobin A1c; Future    Hyperglycemia  -     Hemoglobin A1c; Future      Treatment options were discussed. The surgical process of left knee replacement was discussed in detail with the patient including a detailed discussion of the procedure itself (including visual model, x-ray review, and literature review). The typical perioperative and post-operative course was discussed and perioperative risks were discussed to the patient's satisfaction.  Risks and complications discussed included but were not limited to the risks of anesthetic complications, infection, bleeding, wound healing complications, stiffness, aseptic loosening, instability, limb length inequality, neurologic dysfunction including numbness and weakness, additional surgery,  DVT, pulmonary embolism, perioperative medical risks (cardiac, pulmonary, renal, neurologic), and death and the patient elects to proceed.  The patient should get medically cleared and attend the joint seminar.Kamla Espinoza is aware that morbid obesity carries increased risks with joint replacement surgery.  These include problems with wound healing, alignment of the prosthesis, higher chance of infection and other medical complications, as well as the potential for early implant failure.    Eliquis for DVT prophylaxis    Will need a HBA1C.

## 2019-11-05 DIAGNOSIS — M17.12 PRIMARY OSTEOARTHRITIS OF LEFT KNEE: Primary | ICD-10-CM

## 2019-11-06 ENCOUNTER — TELEPHONE (OUTPATIENT)
Dept: PREADMISSION TESTING | Facility: HOSPITAL | Age: 69
End: 2019-11-06

## 2019-11-06 DIAGNOSIS — M79.605 PAIN OF LEFT LOWER EXTREMITY: ICD-10-CM

## 2019-11-06 DIAGNOSIS — Z01.818 PRE-OP TESTING: Primary | ICD-10-CM

## 2019-11-06 NOTE — TELEPHONE ENCOUNTER
----- Message from Ewa Patterson LPN sent at 11/6/2019  8:48 AM CST -----  Surgery 11/19    Patient needs CBC,BMP,PT/INR,POC AND OPOC appt.

## 2019-11-11 ENCOUNTER — HOSPITAL ENCOUNTER (OUTPATIENT)
Dept: RADIOLOGY | Facility: HOSPITAL | Age: 69
Discharge: HOME | End: 2019-11-11
Attending: NURSE PRACTITIONER
Payer: MEDICARE

## 2019-11-11 ENCOUNTER — TELEPHONE (OUTPATIENT)
Dept: ORTHOPEDICS | Facility: CLINIC | Age: 69
End: 2019-11-11

## 2019-11-11 ENCOUNTER — OFFICE VISIT (OUTPATIENT)
Dept: ORTHOPEDICS | Facility: CLINIC | Age: 69
End: 2019-11-11
Payer: MEDICARE

## 2019-11-11 VITALS
TEMPERATURE: 98 F | HEIGHT: 62 IN | WEIGHT: 216.94 LBS | SYSTOLIC BLOOD PRESSURE: 138 MMHG | HEART RATE: 88 BPM | BODY MASS INDEX: 39.92 KG/M2 | DIASTOLIC BLOOD PRESSURE: 80 MMHG

## 2019-11-11 DIAGNOSIS — Z01.818 PREOP EXAMINATION: Primary | ICD-10-CM

## 2019-11-11 DIAGNOSIS — E08.22 DIABETES MELLITUS DUE TO UNDERLYING CONDITION WITH DIABETIC CHRONIC KIDNEY DISEASE: ICD-10-CM

## 2019-11-11 DIAGNOSIS — M25.562 LEFT KNEE PAIN, UNSPECIFIED CHRONICITY: ICD-10-CM

## 2019-11-11 DIAGNOSIS — M17.12 PRIMARY OSTEOARTHRITIS OF LEFT KNEE: ICD-10-CM

## 2019-11-11 DIAGNOSIS — M25.562 LEFT KNEE PAIN, UNSPECIFIED CHRONICITY: Primary | ICD-10-CM

## 2019-11-11 DIAGNOSIS — Z96.652 S/P TOTAL KNEE REPLACEMENT USING CEMENT, LEFT: ICD-10-CM

## 2019-11-11 PROCEDURE — 73560 XR KNEE 1 OR 2 VIEW LEFT: ICD-10-PCS | Mod: 26,LT,, | Performed by: RADIOLOGY

## 2019-11-11 PROCEDURE — 73560 X-RAY EXAM OF KNEE 1 OR 2: CPT | Mod: 26,LT,, | Performed by: RADIOLOGY

## 2019-11-11 PROCEDURE — 99499 UNLISTED E&M SERVICE: CPT | Mod: S$GLB,,, | Performed by: NURSE PRACTITIONER

## 2019-11-11 PROCEDURE — 99499 NO LOS: ICD-10-PCS | Mod: S$GLB,,, | Performed by: NURSE PRACTITIONER

## 2019-11-11 PROCEDURE — 73560 X-RAY EXAM OF KNEE 1 OR 2: CPT | Mod: TC,LT

## 2019-11-11 PROCEDURE — 99999 PR PBB SHADOW E&M-EST. PATIENT-LVL III: CPT | Mod: PBBFAC,,, | Performed by: NURSE PRACTITIONER

## 2019-11-11 PROCEDURE — 99999 PR PBB SHADOW E&M-EST. PATIENT-LVL III: ICD-10-PCS | Mod: PBBFAC,,, | Performed by: NURSE PRACTITIONER

## 2019-11-11 NOTE — TELEPHONE ENCOUNTER
Spoke with pt, notified her of her results. She verbalized understanding and had no further questions.    ----- Message from Raffaele Saucedo MD sent at 11/11/2019  3:30 PM CST -----  Please call pt.  Lab looks great.

## 2019-11-12 RX ORDER — MIDAZOLAM HYDROCHLORIDE 1 MG/ML
1 INJECTION INTRAMUSCULAR; INTRAVENOUS EVERY 5 MIN PRN
Status: CANCELLED | OUTPATIENT
Start: 2019-11-12

## 2019-11-12 RX ORDER — PREGABALIN 25 MG/1
75 CAPSULE ORAL
Status: CANCELLED | OUTPATIENT
Start: 2019-11-12

## 2019-11-12 RX ORDER — MUPIROCIN 20 MG/G
1 OINTMENT TOPICAL 2 TIMES DAILY
Status: CANCELLED | OUTPATIENT
Start: 2019-11-12 | End: 2019-11-17

## 2019-11-12 RX ORDER — OXYCODONE HYDROCHLORIDE 5 MG/1
15 TABLET ORAL
Status: CANCELLED | OUTPATIENT
Start: 2019-11-12

## 2019-11-12 RX ORDER — SODIUM CHLORIDE 0.9 % (FLUSH) 0.9 %
10 SYRINGE (ML) INJECTION
Status: CANCELLED | OUTPATIENT
Start: 2019-11-12

## 2019-11-12 RX ORDER — PREGABALIN 25 MG/1
75 CAPSULE ORAL NIGHTLY
Status: CANCELLED | OUTPATIENT
Start: 2019-11-12

## 2019-11-12 RX ORDER — BISACODYL 10 MG
10 SUPPOSITORY, RECTAL RECTAL EVERY 12 HOURS PRN
Status: CANCELLED | OUTPATIENT
Start: 2019-11-12

## 2019-11-12 RX ORDER — MUPIROCIN 20 MG/G
1 OINTMENT TOPICAL
Status: CANCELLED | OUTPATIENT
Start: 2019-11-12

## 2019-11-12 RX ORDER — FENTANYL CITRATE 50 UG/ML
25 INJECTION, SOLUTION INTRAMUSCULAR; INTRAVENOUS EVERY 5 MIN PRN
Status: CANCELLED | OUTPATIENT
Start: 2019-11-12

## 2019-11-12 RX ORDER — LIDOCAINE HYDROCHLORIDE 10 MG/ML
1 INJECTION, SOLUTION EPIDURAL; INFILTRATION; INTRACAUDAL; PERINEURAL
Status: CANCELLED | OUTPATIENT
Start: 2019-11-12

## 2019-11-12 RX ORDER — OXYCODONE HYDROCHLORIDE 5 MG/1
10 TABLET ORAL
Status: CANCELLED | OUTPATIENT
Start: 2019-11-12

## 2019-11-12 RX ORDER — FAMOTIDINE 20 MG/1
20 TABLET, FILM COATED ORAL 2 TIMES DAILY
Status: CANCELLED | OUTPATIENT
Start: 2019-11-12

## 2019-11-12 RX ORDER — SODIUM CHLORIDE 9 MG/ML
INJECTION, SOLUTION INTRAVENOUS
Status: CANCELLED | OUTPATIENT
Start: 2019-11-12

## 2019-11-12 RX ORDER — CELECOXIB 100 MG/1
400 CAPSULE ORAL
Status: CANCELLED | OUTPATIENT
Start: 2019-11-12

## 2019-11-12 RX ORDER — RAMELTEON 8 MG/1
8 TABLET ORAL NIGHTLY PRN
Status: CANCELLED | OUTPATIENT
Start: 2019-11-12

## 2019-11-12 RX ORDER — POLYETHYLENE GLYCOL 3350 17 G/17G
17 POWDER, FOR SOLUTION ORAL DAILY
Status: CANCELLED | OUTPATIENT
Start: 2019-11-13

## 2019-11-12 RX ORDER — MORPHINE SULFATE 10 MG/ML
2 INJECTION, SOLUTION INTRAMUSCULAR; INTRAVENOUS
Status: CANCELLED | OUTPATIENT
Start: 2019-11-12

## 2019-11-12 RX ORDER — AMOXICILLIN 250 MG
1 CAPSULE ORAL 2 TIMES DAILY
Status: CANCELLED | OUTPATIENT
Start: 2019-11-12

## 2019-11-12 RX ORDER — NALOXONE HCL 0.4 MG/ML
0.02 VIAL (ML) INJECTION
Status: CANCELLED | OUTPATIENT
Start: 2019-11-12

## 2019-11-12 RX ORDER — OXYCODONE HYDROCHLORIDE 5 MG/1
5 TABLET ORAL
Status: CANCELLED | OUTPATIENT
Start: 2019-11-12

## 2019-11-12 RX ORDER — ROPIVACAINE HYDROCHLORIDE 2 MG/ML
8 INJECTION, SOLUTION EPIDURAL; INFILTRATION; PERINEURAL CONTINUOUS
Status: CANCELLED | OUTPATIENT
Start: 2019-11-12

## 2019-11-12 RX ORDER — ACETAMINOPHEN 500 MG
1000 TABLET ORAL EVERY 6 HOURS
Status: CANCELLED | OUTPATIENT
Start: 2019-11-12 | End: 2019-11-14

## 2019-11-12 RX ORDER — ASPIRIN 81 MG/1
81 TABLET ORAL 2 TIMES DAILY
Status: CANCELLED | OUTPATIENT
Start: 2019-11-12

## 2019-11-12 RX ORDER — SODIUM CHLORIDE 9 MG/ML
INJECTION, SOLUTION INTRAVENOUS CONTINUOUS
Status: CANCELLED | OUTPATIENT
Start: 2019-11-12 | End: 2019-11-13

## 2019-11-12 RX ORDER — ONDANSETRON 2 MG/ML
4 INJECTION INTRAMUSCULAR; INTRAVENOUS EVERY 8 HOURS PRN
Status: CANCELLED | OUTPATIENT
Start: 2019-11-12

## 2019-11-12 NOTE — H&P (VIEW-ONLY)
CC: Left knee pain    Kamla Espinoza is a 69 y.o. female with a history of Left knee pain. Pain is worse with activity and weight bearing.  Patient has experienced interference of activities of daily living due to decreased range of motion and an increase in joint pain and swelling.  Patient has failed non-operative treatment including NSAIDs, corticosteroid injections, viscosupplement injections, and activity modification.  Kamla Espinoza currently ambulates with a cane.     Relevant medical conditions of significance in perioperative period:  Type 2 DM- on medication managed by pcp  HTN- on medication managed by pcp  CKD- managed by pcp    Past Medical History:   Diagnosis Date    Diabetes mellitus, type 2     High cholesterol     Hypertension        Past Surgical History:   Procedure Laterality Date    HIP SURGERY Left 04/22/2016    THR       Family History   Problem Relation Age of Onset    Diabetes Mother     Heart disease Mother     Heart disease Father     Heart disease Brother     Lung disease Brother        Review of patient's allergies indicates:  No Known Allergies      Current Outpatient Medications:     ACCU-CHEK JHONATHAN CONTROL SOLN Soln, , Disp: , Rfl:     ACCU-CHEK JHONATHAN PLUS TEST STRP Strp, , Disp: , Rfl:     ACCU-CHEK SOFTCLIX LANCETS Misc, , Disp: , Rfl:     acetaminophen (TYLENOL) 650 MG TbSR, Take 650 mg by mouth 3 (three) times daily as needed. Hip pain, Disp: , Rfl:     amlodipine (NORVASC) 5 MG tablet, Take 10 mg by mouth once daily. , Disp: , Rfl:     aspirin (ECOTRIN) 81 MG EC tablet, Take 81 mg by mouth once daily., Disp: , Rfl:     BD ALCOHOL SWABS PadM, , Disp: , Rfl:     calcium carbonate 300 mg (750 mg) Chew, Take by mouth. tums for acid reflux, Disp: , Rfl:     diclofenac sodium 1 % Gel, Apply 2 g topically as needed. , Disp: , Rfl:     fluticasone propionate (FLONASE) 50 mcg/actuation nasal spray, 1 spray by Nasal route., Disp: , Rfl:      "lisinopril-hydrochlorothiazide (PRINZIDE,ZESTORETIC) 20-25 mg Tab, Take 1 tablet by mouth once daily., Disp: , Rfl:     metformin (GLUCOPHAGE) 500 MG tablet, Take 500 mg by mouth 2 (two) times daily with meals., Disp: , Rfl:     topiramate (TOPAMAX) 50 MG tablet, Take 1 tablet (50 mg total) by mouth 2 (two) times daily., Disp: 60 tablet, Rfl: 3    tramadol (ULTRAM) 50 mg tablet, Take 50 mg by mouth every 6 (six) hours as needed for Pain., Disp: , Rfl:     levocetirizine (XYZAL) 5 MG tablet, TAKE ONE TABLET BY MOUTH EVERY DAY IN THE EVENING AS NEEDED FOR ALLERGY SYMPTOMS., Disp: , Rfl: 4    Review of Systems:  Constitutional: no fever or chills  Eyes: no visual changes  ENT: no nasal congestion or sore throat  Respiratory: no cough or shortness of breath  Cardiovascular: no chest pain or palpitations  Gastrointestinal: no nausea or vomiting, tolerating diet  Genitourinary: no hematuria or dysuria  Integument/Breast: no rash or pruritis  Hematologic/Lymphatic: no easy bruising or lymphadenopathy  Musculoskeletal: positive for c/o left knee pain worse with activity  Neurological: no seizures or tremors  Behavioral/Psych: no auditory or visual hallucinations  Endocrine: no heat or cold intolerance    PE:  /80   Pulse 88   Temp 98.2 °F (36.8 °C)   Ht 5' 1.5" (1.562 m)   Wt 98.4 kg (216 lb 14.9 oz)   BMI 40.33 kg/m²   General: Pleasant, cooperative, NAD   Gait: antalgic  HEENT: NCAT, sclera nonicteric   Lungs: Respirations clear bilaterally; equal and unlabored.   CV: S1S2; 2+ bilateral upper and lower extremity pulses.   Skin: Intact throughout with no rashes, erythema, or lesions  Extremities: No LE edema,  no erythema or warmth of the skin in either lower extremity.    Left knee exam:  Knee Range of Motion:normal, pain with passive range of motion  Effusion:none  Condition of skin:intact  Location of tenderness:Medial joint line   Strength:normal  Stability: stable to testing    Hip " Examination:normal    Radiographs: Radiographs reveal advanced degenerative osteoarthritic change of the bilateral tibiofemoral and patellofemoral compartments.  There is no evidence of osseous fracture.  The osseous structures are otherwise unremarkable    Knee Alignment: normal    Diagnosis: osteoarthritis Left knee    Plan: Left total knee arthroplasty    Due to the serious nature of total joint infection and the high prevalence of community acquired MRSA, vancomycin will be used perioperatively.

## 2019-11-12 NOTE — PROGRESS NOTES
Kamla Espinoza is a 69 y.o. year old here today for a pre-operative visit in preparation for a Left total knee arthroplasty to be performed by  Dr. Saucedo on 11/19/19.  she was last seen and treated in the clinic on 11/4/2019. she will be medically optimized by the pre op center. There has been no significant change in medical status since last visit. No fever, chills, malaise, or unexplained weight change.      Allergies, Medications, past medical and surgical history reviewed.    Focused examination performed.    Patient declined to see Dr. Saucedo today in clinic. All questions answered. Patient encouraged to call with questions. Contact information given.     Pre, eveline, and post operative procedures and expectations discussed. Questions were answered. Kamla Espinoza has been educated and is ready to proceed with surgery. Approximately 30 minutes was spent discussing surgical outcomes, plans, procedures pre, eveline, and post operative expections and care.  Surgical consent signed.    Kamla Espinoza will contact us if there are any questions, concerns, or changes in medical status prior to surgery.

## 2019-11-14 ENCOUNTER — ANESTHESIA EVENT (OUTPATIENT)
Dept: SURGERY | Facility: HOSPITAL | Age: 69
End: 2019-11-14
Payer: MEDICARE

## 2019-11-14 DIAGNOSIS — Z01.818 PRE-OP TESTING: Primary | ICD-10-CM

## 2019-11-14 NOTE — ANESTHESIA PREPROCEDURE EVALUATION
Anesthesia Assessment: Preoperative EQUATION     Planned Procedure: Procedure(s) (LRB):  ARTHROPLASTY, KNEE, TOTAL (Left)  Requested Anesthesia Type:Regional  Surgeon: Raffaele Saucedo MD  Service: Orthopedics  Known or anticipated Date of Surgery:11/19/2019     Surgeon notes: reviewed     Previous anesthesia records: 4/22/16   REPLACEMENT-HIP-TOTAL WITH NAVIGATION (Left Hip); CSE      Last PCP note: outside [facility] , Dr Shannan Wilson     Other important co-morbidities: HTN, HLP, DM2, CKD2     Tests already available:  BMP, HEME PROFILE, PT/INR, A1C (5.6) 11/11/19                       Plan: testing: EKG            Pre-anesthesia visit:  Visit focus: possible regional anesthesia and/or nerve block   Consultation: IM Perioperative Hospitalist     Navigation: Tests scheduled, completed  Conults scheduled  Results will be tracked by Preop Clinic      Electronically signed by Aide Blankenship RN at 11/14/2019 11:53 AM                                                                                                              11/14/2019  Kamla Espinoza is a 69 y.o., female.    Anesthesia Evaluation    I have reviewed the Patient Summary Reports.    I have reviewed the Nursing Notes.   I have reviewed the Medications.     Review of Systems  Anesthesia Hx:  No problems with previous Anesthesia  History of prior surgery of interest to airway management or planning: Previous anesthesia: Spinal  4/22/16: Left hip replacement with spinal.  Procedure performed at an [facility].  Successful Spinal/Epidural level at L3-L4   Denies Family Hx of Anesthesia complications.   Denies Personal Hx of Anesthesia complications.   Social:  Non-Smoker, No Alcohol Use  Tobacco Use: , quit smoking <10 years Alcohol Use: Pt consumes occasional,    Hematology/Oncology:  Hematology Normal      Denies Current/Recent Cancer   EENT/Dental:  EENT/Dental Normal Denies Throat Symptoms  Denies Jaw Problems    Cardiovascular:  Functional Capacity good / => 4 METS, PT 2x weekly; daily PT exercises at home: denies CP/SOB   Denies Coronary Artery Disease.   Hx of Heart Murmur   Denies Deep Venous Thrombosis (DVT)  Hypertension , Recent typical clinic B/P of 102/54 @ POC visit    Pulmonary:  Pulmonary Normal  Denies Asthma.  Denies Chronic Obstructive Pulmonary Disease (COPD).  Possible Obstructive Sleep Apnea , (STOP/BANG) Symptoms S - Snoring (loud), P - Pressure being treated for high BP and A - Age > 50        Renal/:  Kidney Function/Disease, Chronic Kidney Disease (CKD)    Hepatic/GI:  Esophageal / Stomach Disorders Gerd Controlled by PRN antireflux medication.    Musculoskeletal:   pt. reports lower back pain Joint Disease:  Arthritis    Neurological:  Neurology Normal  Pain , location of bilateral knee , severity is a 7 , alleviating factors are Ibuprofen  prn.   Endocrine:  Diabetes, Type 2 Diabetes for 6 years , controlled by oral hypoglycemics. , most recent HgA1c value was 5.6 on 11/11/19.  Denies Thyroid Disease        Physical Exam  General:  Obesity    Airway/Jaw/Neck:  Airway Findings: Mouth Opening: Normal Tongue: Normal  General Airway Assessment: Adult  Mallampati: III  Improves to II with phonation.  TM Distance: Normal, at least 6 cm  Jaw/Neck Findings:  Neck ROM: Normal ROM  Neck Findings:  Red   Birthmark to anterior neck      Dental:  Dental Findings: Upper Dentures   Chest/Lungs:  Chest/Lungs Findings: Clear to auscultation, Normal Respiratory Rate     Heart/Vascular:  Heart Findings: Rate: Normal  Rhythm: Regular Rhythm  Sounds: Normal        Mental Status:  Mental Status Findings:  Cooperative, Alert and Oriented         Anesthesia Plan  Type of Anesthesia, risks & benefits discussed:  Anesthesia Type:  CSE, regional, general, MAC  Patient's Preference:   Intra-op Monitoring Plan: standard ASA monitors  Intra-op Monitoring Plan Comments:   Post Op Pain Control Plan: peripheral nerve block,  multimodal analgesia, IV/PO Opioids PRN and per primary service following discharge from PACU  Post Op Pain Control Plan Comments:   Induction:   IV  Beta Blocker:  Patient is not currently on a Beta-Blocker (No further documentation required).       Informed Consent: Patient understands risks and agrees with Anesthesia plan.  Questions answered. Anesthesia consent signed with patient.  ASA Score: 3     Day of Surgery Review of History & Physical:        Anesthesia Plan Notes: Adductor Canal Cath  IPAC  CSE        Ready For Surgery From Anesthesia Perspective.     The patient was seen by Perioperative Internal Medicine physician Dr. Galvez on 11/15/19 , please see recommendations.    Discharge Plan: To home with daughter ([place]: [phone])    Chris Elizondo RN

## 2019-11-14 NOTE — PRE ADMISSION SCREENING
Anesthesia Assessment: Preoperative EQUATION    Planned Procedure: Procedure(s) (LRB):  ARTHROPLASTY, KNEE, TOTAL (Left)  Requested Anesthesia Type:Regional  Surgeon: Raffaele Saucedo MD  Service: Orthopedics  Known or anticipated Date of Surgery:11/19/2019    Surgeon notes: reviewed    Previous anesthesia records: 4/22/16   REPLACEMENT-HIP-TOTAL WITH NAVIGATION (Left Hip); CSE     Last PCP note: outside [facility] , Dr Shannan Wilson     Other important co-morbidities: HTN, HLP, DM2, CKD2     Tests already available:  BMP, HEME PROFILE, PT/INR, A1C (5.6) 11/11/19            Plan: testing: EKG            Pre-anesthesia visit:  Visit focus: possible regional anesthesia and/or nerve block   Consultation: IM Perioperative Hospitalist    Navigation: Tests scheduled, completed  Conults scheduled  Results will be tracked by Preop Clinic

## 2019-11-14 NOTE — PRE-PROCEDURE INSTRUCTIONS
Chart review, triage plan initiated. Phone contact-pt instructed to stop vitamins, herbal supplements and NSAIDS-which she states she has stopped. Informed of all appointments tomorrow including EKG at 2:30 pm. Pt verbalized understanding.

## 2019-11-15 ENCOUNTER — HOSPITAL ENCOUNTER (OUTPATIENT)
Dept: PREADMISSION TESTING | Facility: HOSPITAL | Age: 69
Discharge: HOME | End: 2019-11-15
Attending: ANESTHESIOLOGY
Payer: MEDICARE

## 2019-11-15 ENCOUNTER — HOSPITAL ENCOUNTER (OUTPATIENT)
Dept: CARDIOLOGY | Facility: CLINIC | Age: 69
Discharge: HOME | End: 2019-11-15
Payer: MEDICARE

## 2019-11-15 ENCOUNTER — INITIAL CONSULT (OUTPATIENT)
Dept: INTERNAL MEDICINE | Facility: CLINIC | Age: 69
End: 2019-11-15
Payer: MEDICARE

## 2019-11-15 VITALS
HEART RATE: 74 BPM | BODY MASS INDEX: 40.97 KG/M2 | SYSTOLIC BLOOD PRESSURE: 102 MMHG | HEIGHT: 61 IN | WEIGHT: 217 LBS | TEMPERATURE: 98 F | DIASTOLIC BLOOD PRESSURE: 54 MMHG | OXYGEN SATURATION: 98 %

## 2019-11-15 DIAGNOSIS — R94.31 ABNORMAL EKG: ICD-10-CM

## 2019-11-15 DIAGNOSIS — Z01.818 PRE-OP TESTING: ICD-10-CM

## 2019-11-15 DIAGNOSIS — I10 ESSENTIAL HYPERTENSION: ICD-10-CM

## 2019-11-15 DIAGNOSIS — R06.83 SNORING: ICD-10-CM

## 2019-11-15 DIAGNOSIS — E11.22 TYPE 2 DIABETES MELLITUS WITH CHRONIC KIDNEY DISEASE, WITHOUT LONG-TERM CURRENT USE OF INSULIN, UNSPECIFIED CKD STAGE: ICD-10-CM

## 2019-11-15 DIAGNOSIS — K21.9 GASTROESOPHAGEAL REFLUX DISEASE, ESOPHAGITIS PRESENCE NOT SPECIFIED: ICD-10-CM

## 2019-11-15 DIAGNOSIS — Z01.818 PREOP EXAMINATION: Primary | ICD-10-CM

## 2019-11-15 DIAGNOSIS — E66.01 MORBID OBESITY: ICD-10-CM

## 2019-11-15 DIAGNOSIS — R71.8 RBC MICROCYTOSIS: ICD-10-CM

## 2019-11-15 DIAGNOSIS — E78.5 HYPERLIPIDEMIA, UNSPECIFIED HYPERLIPIDEMIA TYPE: ICD-10-CM

## 2019-11-15 DIAGNOSIS — N28.9 RENAL IMPAIRMENT: ICD-10-CM

## 2019-11-15 DIAGNOSIS — R01.1 CARDIAC MURMUR: ICD-10-CM

## 2019-11-15 PROCEDURE — 99999 PR PBB SHADOW E&M-EST. PATIENT-LVL I: CPT | Mod: PBBFAC,,, | Performed by: HOSPITALIST

## 2019-11-15 PROCEDURE — 3074F SYST BP LT 130 MM HG: CPT | Mod: CPTII,S$GLB,, | Performed by: HOSPITALIST

## 2019-11-15 PROCEDURE — 3078F DIAST BP <80 MM HG: CPT | Mod: CPTII,S$GLB,, | Performed by: HOSPITALIST

## 2019-11-15 PROCEDURE — 3044F HG A1C LEVEL LT 7.0%: CPT | Mod: CPTII,S$GLB,, | Performed by: HOSPITALIST

## 2019-11-15 PROCEDURE — 1101F PR PT FALLS ASSESS DOC 0-1 FALLS W/OUT INJ PAST YR: ICD-10-PCS | Mod: CPTII,S$GLB,, | Performed by: HOSPITALIST

## 2019-11-15 PROCEDURE — 99214 PR OFFICE/OUTPT VISIT, EST, LEVL IV, 30-39 MIN: ICD-10-PCS | Mod: S$GLB,,, | Performed by: HOSPITALIST

## 2019-11-15 PROCEDURE — 93010 ELECTROCARDIOGRAM REPORT: CPT | Mod: S$GLB,,, | Performed by: INTERNAL MEDICINE

## 2019-11-15 PROCEDURE — 3078F PR MOST RECENT DIASTOLIC BLOOD PRESSURE < 80 MM HG: ICD-10-PCS | Mod: CPTII,S$GLB,, | Performed by: HOSPITALIST

## 2019-11-15 PROCEDURE — 1101F PT FALLS ASSESS-DOCD LE1/YR: CPT | Mod: CPTII,S$GLB,, | Performed by: HOSPITALIST

## 2019-11-15 PROCEDURE — 93005 ELECTROCARDIOGRAM TRACING: CPT | Mod: S$GLB,,, | Performed by: ANESTHESIOLOGY

## 2019-11-15 PROCEDURE — 99214 OFFICE O/P EST MOD 30 MIN: CPT | Mod: S$GLB,,, | Performed by: HOSPITALIST

## 2019-11-15 PROCEDURE — 93005 EKG 12-LEAD: ICD-10-PCS | Mod: S$GLB,,, | Performed by: ANESTHESIOLOGY

## 2019-11-15 PROCEDURE — 93010 EKG 12-LEAD: ICD-10-PCS | Mod: S$GLB,,, | Performed by: INTERNAL MEDICINE

## 2019-11-15 PROCEDURE — 99999 PR PBB SHADOW E&M-EST. PATIENT-LVL I: ICD-10-PCS | Mod: PBBFAC,,, | Performed by: HOSPITALIST

## 2019-11-15 PROCEDURE — 3044F PR MOST RECENT HEMOGLOBIN A1C LEVEL <7.0%: ICD-10-PCS | Mod: CPTII,S$GLB,, | Performed by: HOSPITALIST

## 2019-11-15 PROCEDURE — 3074F PR MOST RECENT SYSTOLIC BLOOD PRESSURE < 130 MM HG: ICD-10-PCS | Mod: CPTII,S$GLB,, | Performed by: HOSPITALIST

## 2019-11-15 RX ORDER — IBUPROFEN 800 MG/1
800 TABLET ORAL DAILY PRN
COMMUNITY
End: 2020-08-26

## 2019-11-15 NOTE — ASSESSMENT & PLAN NOTE
Type 2 Diabetes Mellitus  On treatment with oral agent not on Insulin    Hemoglobin A1c- 5.6 - Nov 2019   Capillary glucose check-none       Diabetes Mellitus-I suggest monitoring the glucose in the perioperative period ( Before meals and bed time,if the patient is on oral feeds or every 6 hourly ,if the patient is NPO )  Blood glucose target in hospitalized patients is 140-180. Oral Hypoglycemic agents are generally avoided during the hospital stay . If glucose is consistently elevated ,I suggest using basal ,prandial Insulin regimen to control the glucose , as elevated glucose can be associated with adverse surgical out comes. Please consider involving Hospital Medicine or Endocrinology ,if any help is needed with Glucose control. Patient will be instructed based on the pre op clinic guidelines  about adjustment of diabetic treatment (If applicable )  considering the NPO status for Surgery

## 2019-11-15 NOTE — ASSESSMENT & PLAN NOTE
Possible sleep apnea- I suggest a sleep study and suggest caution with usage of medication that can cause respiratory suppression in the perioperative period  potential ramifications of untreated sleep apnea, which could include daytime sleepiness, hypertension, heart disease and stroke were discussed    Care with Sedating Medication discussed     Avoidance of  supine sleep, weight gain , alcoholic beverages , care with , sedative , CNS depressant use indicated  since all of these can worsen YOBANY

## 2019-11-15 NOTE — HPI
History of present illness- I had the pleasure of meeting this pleasant 69 y.o. lady in the pre op clinic prior to her elective Orthopedic surgery. The patient is kbown to me . Kamla was accompanied by daughter Rika.    I have obtained the history by speaking to the patient and by reviewing the electronic health records.    Events leading up to surgery / History of presenting illness -    She has been troubled with severe  Left knee   pain for 1-2 years  . Pain increases with activity and decreases with resting.      Relevant health conditions of significance for the perioperative period/ History of presenting illness -    Health conditions of significance for the perioperative period     DM    HTN    Renal impairment     Acid reflux    Anemia     Lives with daughter who works nights   Will take time off to help    Not known to have heart disease ,  Lung disease

## 2019-11-15 NOTE — ASSESSMENT & PLAN NOTE
Stages of CKD discussed  Deleterious effects NSAID's , Beneficial effects of Hydration discussed   Tylenol as needed for pain     I  suggest monitoring renal function, in put and out put status eveline-operatively. I  suggest avoiding nephrotoxic medication including NSAIDs, COX2 inhibitors, intravenous contrast agent,avoiding hypotension to prevent further renal impairment.

## 2019-11-15 NOTE — ASSESSMENT & PLAN NOTE
GERD  Symptoms -acid taste to the throat   Using as needed antacids   I suggest aspiration precautions

## 2019-11-15 NOTE — OUTPATIENT SUBJECTIVE & OBJECTIVE
Outpatient Subjective & Objective     Chief complaint-Preoperative evaluation, Perioperative Medical management, complication reduction plan     Active cardiac conditions- none    Revised cardiac risk index predictors- none    Functional capacity -Examples of physical activity, limited, laundry, sweeping ,   house work and can take a flight of stairs holding on to the railing----- She can undertake all the above activities without  chest pain,chest tightness, Shortness of breath ,dizziness,lightheadedness making her exercise tolerance more,  than 4 Mets.        Review of Systems   Constitutional: Negative for chills and fever.   HENT:        STOPBANG score  5/ 8    Loud Snoring   HTN  BMI> 35   Age over 50   Neck size over 40 CM     Eyes:        No new visual changes   Respiratory:        No cough , phlegm    No Hemoptysis   Cardiovascular:        As noted   Gastrointestinal:        No overt GI/ blood losses  Bowel movements- Regular   Endocrine:        Prednisone use > 20 mg daily for 3 weeks- none   Genitourinary: Negative for dysuria.        No urinary hesitancy    Musculoskeletal:        As above   Both knees hurt   Skin: Negative for rash.   Neurological: Negative for syncope.        No unilateral weakness   Hematological:        Current use of Anticoagulants- none    Aspirin use for preventive reasons    Psychiatric/Behavioral:        No Depression,Anxiety     No vascular stenting     No past medical history pertinent negatives.        No anesthesia, bleeding, cardiac problems ,PONVwith previous surgeries/procedures.  Medications and Allergies reviewed in epic.   FH- No anesthesia,bleeding / venous thrombosis ,  in family     Physical Exam   /54  Pulse 74   Temp 98.2   Sat 98 %      Physical Exam  Constitutional-General appearance-Conscious,Coherent  Eyes- No conjunctival icterus,pupils  round  and reactive to light   ENT-Oral cavity- moist  , Hearing grossly normal   Neck- No thyromegaly ,Trachea  -central, No jugular venous distension,   No Carotid Bruit   Cardiovascular -Heart Sounds-sitting , reclining ,  Normal  and  systolic murmur aortic area   , No gallop rhythm   Respiratory - Normal Respiratory Effort, Normal breath sounds,  no wheeze  and  no forced expiratory wheeze    Peripheral pitting pedal edema-- none , no calf pain   Gastrointestinal -Soft abdomen, No palpable masses, Non Tender,Liver,Spleen not palpable. No-- free fluid and shifting dullness  Musculoskeletal- No finger Clubbing. Strength grossly normal   Lymphatic-No Palpable cervical, axillary,Inguinal lymphadenopathy   Psychiatric - normal effect,Orientation  Rt Dorsalis pedis pulses-palpable    Lt Dorsalis pedis pulses- palpable   Rt Posterior tibial pulses -palpable   Left posterior tibial pulses -palpable   Miscellaneous -  no renal bruit    Investigations  Lab and Imaging have been reviewed in epic.    Review of Medicine tests    EKG- I had independently reviewed the EKG from--11/15/2019   It was reported to be showing     Normal sinus rhythm  Nonspecific T wave abnormality  Abnormal ECG  When compared with ECG of 15-APR-2016 10:55,  No significant change was found    Review of clinical lab tests:  Lab Results   Component Value Date    CREATININE 1.3 11/11/2019    HGB 11.3 (L) 11/11/2019     11/11/2019           Review of old records- Was done and information gathered regards to events leading to surgery and health conditions of significance in the perioperative period.    Outpatient Subjective & Objective

## 2019-11-15 NOTE — PROGRESS NOTES
[facility] - Pre Op Consult  Progress Note    Patient Name: Kamla Espinoza  MRN: [id number]  Date of Evaluation- 11/15/2019  PCP- Shannan Wilson MD    Future cases for Kamla Espinoza [[id number]]     Case ID Status Date Time Michel Procedure Provider Location    [id number] [facility] 11/19/2019 11:52  ARTHROPLASTY, KNEE, TOTAL Raffaele Saucedo MD [[id number]] [facility] OR      Left     HPI:  History of present illness- I had the pleasure of meeting this pleasant 69 y.o. lady in the pre op clinic prior to her elective Orthopedic surgery. The patient is kbown to me . Kamla was accompanied by daughter Rika.    I have obtained the history by speaking to the patient and by reviewing the electronic health records.    Events leading up to surgery / History of presenting illness -    She has been troubled with severe  Left knee   pain for 1-2 years  . Pain increases with activity and decreases with resting.      Relevant health conditions of significance for the perioperative period/ History of presenting illness -    Health conditions of significance for the perioperative period     DM    HTN    Renal impairment     Acid reflux    Anemia     Lives with daughter who works nights   Will take time off to help    Not known to have heart disease ,  Lung disease         Subjective/ Objective:       Chief complaint-Preoperative evaluation, Perioperative Medical management, complication reduction plan     Active cardiac conditions- none    Revised cardiac risk index predictors- none    Functional capacity -Examples of physical activity, limited, laundry, sweeping ,   house work and can take a flight of stairs holding on to the railing----- She can undertake all the above activities without  chest pain,chest tightness, Shortness of breath ,dizziness,lightheadedness making her exercise tolerance more,  than 4 Mets.        Review of Systems   Constitutional: Negative for chills and fever.   HENT:        STOPBANG score  5/ 8    Loud Snoring    HTN  BMI> 35   Age over 50   Neck size over 40 CM     Eyes:        No new visual changes   Respiratory:        No cough , phlegm    No Hemoptysis   Cardiovascular:        As noted   Gastrointestinal:        No overt GI/ blood losses  Bowel movements- Regular   Endocrine:        Prednisone use > 20 mg daily for 3 weeks- none   Genitourinary: Negative for dysuria.        No urinary hesitancy    Musculoskeletal:        As above   Both knees hurt   Skin: Negative for rash.   Neurological: Negative for syncope.        No unilateral weakness   Hematological:        Current use of Anticoagulants- none    Aspirin use for preventive reasons    Psychiatric/Behavioral:        No Depression,Anxiety     No vascular stenting     No past medical history pertinent negatives.        No anesthesia, bleeding, cardiac problems ,PONVwith previous surgeries/procedures.  Medications and Allergies reviewed in epic.   FH- No anesthesia,bleeding / venous thrombosis ,  in family     Physical Exam   /54  Pulse 74   Temp 98.2   Sat 98 %      Physical Exam  Constitutional-General appearance-Conscious,Coherent  Eyes- No conjunctival icterus,pupils  round  and reactive to light   ENT-Oral cavity- moist  , Hearing grossly normal   Neck- No thyromegaly ,Trachea -central, No jugular venous distension,   No Carotid Bruit   Cardiovascular -Heart Sounds-sitting , reclining ,  Normal  and  systolic murmur aortic area   , No gallop rhythm   Respiratory - Normal Respiratory Effort, Normal breath sounds,  no wheeze  and  no forced expiratory wheeze    Peripheral pitting pedal edema-- none , no calf pain   Gastrointestinal -Soft abdomen, No palpable masses, Non Tender,Liver,Spleen not palpable. No-- free fluid and shifting dullness  Musculoskeletal- No finger Clubbing. Strength grossly normal   Lymphatic-No Palpable cervical, axillary,Inguinal lymphadenopathy   Psychiatric - normal effect,Orientation  Rt Dorsalis pedis pulses-palpable    Lt  Dorsalis pedis pulses- palpable   Rt Posterior tibial pulses -palpable   Left posterior tibial pulses -palpable   Miscellaneous -  no renal bruit    Investigations  Lab and Imaging have been reviewed in [facility].    Review of Medicine tests    EKG- I had independently reviewed the EKG from--11/15/2019   It was reported to be showing     Normal sinus rhythm  Nonspecific T wave abnormality  Abnormal ECG  When compared with ECG of 15-APR-2016 10:55,  No significant change was found    Review of clinical lab tests:  Lab Results   Component Value Date    CREATININE 1.3 11/11/2019    HGB 11.3 (L) 11/11/2019     11/11/2019           Review of old records- Was done and information gathered regards to events leading to surgery and health conditions of significance in the perioperative period.        Preoperative cardiac risk assessment-  The patient does not have any active cardiac conditions . Revised cardiac risk index predictors- -0--.Functional capacity is more than 4 Mets. She will be undergoing a Orthopedic procedure that carries a intermediate risk     Risk of a major Cardiac event ( Defined as death, myocardial infarction, or cardiac arrest at 30 days after noncardiac surgery), based on RCRI score     -3.9%       No further cardiac work up is indicated prior to proceeding with the surgery          American Society of Anesthesiologists Physical status classification ( ASA ) class: 3     Postoperative pulmonary complication risk assessment:      ARISCAT ( [place]et) risk index- risk class -  Low, if duration of surgery is under 3 hours, intermediate, if duration of surgery is over 3 hours          Assessment/Plan:     Essential hypertension  Amlodipine   Lisinopril- HCTZ    BP better since lost weight     Home BP readings -None   Recent BP readings in the [id number]  Hypertension-  Blood pressure is acceptable . I suggest continuation of Amlodipine - during the entire perioperative period. I suggest holding   Lisinopril- HCTZ-- on the morning of the surgery and can continue that  post operatively under blood pressure, electrolyte and renal function monitoring as long as they are acceptable.I suggest addressing pain control as uncontrolled pain can increased blood pressure     Type 2 diabetes mellitus with diabetic chronic kidney disease  Type 2 Diabetes Mellitus  On treatment with oral agent not on Insulin    Hemoglobin A1c- 5.6 - Nov 2019   Capillary glucose check-none       Diabetes Mellitus-I suggest monitoring the glucose in the perioperative period ( Before meals and bed time,if the patient is on oral feeds or every 6 hourly ,if the patient is NPO )  Blood glucose target in hospitalized patients is 140-180. Oral Hypoglycemic agents are generally avoided during the hospital stay . If glucose is consistently elevated ,I suggest using basal ,prandial Insulin regimen to control the glucose , as elevated glucose can be associated with adverse surgical out comes. Please consider involving Hospital Medicine or Endocrinology ,if any help is needed with Glucose control. Patient will be instructed based on the pre op clinic guidelines  about adjustment of diabetic treatment (If applicable )  considering the NPO status for Surgery             Morbid obesity  Lost 40 # in the past 2-3 months   Has  Been cutting down in starches- breast , sugar, rice , pasta, sodas    Has HTN, DM, Osteo arthritis , acid reflux     Not known to  have sleep apnea , fatty liver  Daughter reports snoring , but no apnea  Encouraged weight loss    RBC microcytosis  Had colonoscopy in the past   Due to have stool tested   Hb, apart from post surgery is stable   No family history of blood disorders like thalassemia    Ibuprofen use for about 4 years which likely is the cause of her anemia  Also taking ASA 81 mg daily dose for preventive reason     No overt/ GI /  bleeding   No colon cancer in family  She plans on following up for possible Iron  defeciency       Renal impairment- possible CKD 3  Stages of CKD discussed  Deleterious effects NSAID's , Beneficial effects of Hydration discussed   Tylenol as needed for pain     I  suggest monitoring renal function, in put and out put status eveline-operatively. I  suggest avoiding nephrotoxic medication including NSAIDs, COX2 inhibitors, intravenous contrast agent,avoiding hypotension to prevent further renal impairment.     HLD (hyperlipidemia)  Unsure , if taking statin or not    Cardiac murmur  Not under Cardiology care   No known heat disease   Reduced Physical activity from Long standing back pain, Left knee pain    No chest pain, SOB    Does not sound severe valve disease     Abnormal EKG  Not known to have heart disease , heart failure    Acid reflux  GERD  Symptoms -acid taste to the throat   Using as needed antacids   I suggest aspiration precautions    Snoring    Possible sleep apnea- I suggest a sleep study and suggest caution with usage of medication that can cause respiratory suppression in the perioperative period  potential ramifications of untreated sleep apnea, which could include daytime sleepiness, hypertension, heart disease and stroke were discussed    Care with Sedating Medication discussed     Avoidance of  supine sleep, weight gain , alcoholic beverages , care with , sedative , CNS depressant use indicated  since all of these can worsen YOBANY             Preventive perioperative care    Thromboembolic prophylaxis:  Her risk factors for thrombosis include surgical procedure and age.I suggest  thromboembolic prophylaxis ( mechanical/pharmacological, weighing the risk benefits of pharmacological agent use considering eveline procedural bleeding )  during the perioperative period.I suggested being active in the post operative period.   The patient is a candidate for extended DVT prophylaxis     Postoperative pulmonary complication prophylaxis-Risk factors for post operative pulmonary complications  include age over 65 years and ASA class >2- I suggest incentive spirometry use, early ambulation and end tidal carbon dioxide monitoring  , oral care , head end of bed elevation     Renal complication prophylaxis-Risk factors for renal complications include pre-existing renal disease, diabetes mellitus and hypertension . I suggest keeping her well hydrated and avoidance/ minimizing the use of  NSAID's,YEBOAH 2 Inhibitors ,IV contrast if possible in the perioperative period.    Surgical site Infection Prophylaxis-I  suggest appropriate antibiotic for Prophylaxis against Surgical site infections       In view of regional anesthesia the patient  is at risk of postoperative urinary retention.  I suggest avoidance / minimizing the of  Benzodiazepines,Anticholinergic medication,antihistamines ( Benadryl) , if possible in the perioperative period. I suggest using the minimum possible use of opioids for the minimum period of time in the perioperative period. Benadryl avoidance suggested      This visit was focused on Preoperative evaluation, Perioperative Medical management, complication reduction plans. I suggest that the patient follows up with primary care or relevant sub specialists for ongoing health care.    I appreciate the opportunity to be involved in this patients care. Please feel free to contact me if there were any questions about this consultation.    Patient is optimized     Patient portal - Encouraged to sign up      Jennifer Galvez MD  Perioperative Medicine  [facility]   Pager [phone]    Unable to add Ferritin   Suggested skin check  -  11/15- 18 27     Discussed about possible Iron deficiency  Suggested follow up

## 2019-11-15 NOTE — ASSESSMENT & PLAN NOTE
Not under Cardiology care   No known heat disease   Reduced Physical activity from Long standing back pain, Left knee pain    No chest pain, SOB    Does not sound severe valve disease

## 2019-11-15 NOTE — ASSESSMENT & PLAN NOTE
Had colonoscopy in the past   Due to have stool tested   Hb, apart from post surgery is stable   No family history of blood disorders like thalassemia    Ibuprofen use for about 4 years which likely is the cause of her anemia  Also taking ASA 81 mg daily dose for preventive reason     No overt/ GI /  bleeding   No colon cancer in family  She plans on following up for possible Iron defeciency

## 2019-11-15 NOTE — ASSESSMENT & PLAN NOTE
Lost 40 # in the past 2-3 months   Has  Been cutting down in starches- breast , sugar, rice , pasta, sodas    Has HTN, DM, Osteo arthritis , acid reflux     Not known to  have sleep apnea , fatty liver  Daughter reports snoring , but no apnea  Encouraged weight loss

## 2019-11-15 NOTE — ASSESSMENT & PLAN NOTE
Amlodipine   Lisinopril- HCTZ    BP better since lost weight     Home BP readings -None   Recent BP readings in the [id number]  Hypertension-  Blood pressure is acceptable . I suggest continuation of Amlodipine - during the entire perioperative period. I suggest holding  Lisinopril- HCTZ-- on the morning of the surgery and can continue that  post operatively under blood pressure, electrolyte and renal function monitoring as long as they are acceptable.I suggest addressing pain control as uncontrolled pain can increased blood pressure

## 2019-11-15 NOTE — DISCHARGE INSTRUCTIONS
Your surgery has been scheduled for:__________________________________________    You should report to:  ____[facility], located on the [place] side of the first floor of the           [facility] ([phone])  ____[facility], located on the [facility] side of the            Second floor of the [facility] ([phone])  ____3rd Floor [facility] located on the [facility] side of the [facility] [phone]  ____[facility]: located at [address] [address]. Check in on the first floor of the hospital.  Please Note   - Tell your doctor if you take Aspirin, products containing Aspirin, herbal medications  or blood thinners, such as Coumadin, Ticlid, or Plavix.  (Consult your provider regarding holding or stopping before surgery).  - Arrange for someone to drive you home following surgery.  You will not be allowed to leave the surgical facility alone or drive yourself home following sedation and anesthesia.  Before Surgery  - Stop taking all herbal medications 14 days prior to surgery  - No Motrin/Advil (Ibuprofen)  1 day before surgery  - No Aleve (Naproxen) 7 days before surgery  - Stop Taking Asprin, products containing Asprin _____days before surgery  - Stop taking blood thinners_______days before surgery  - No Goody's/BC  Powder 7 days before surgery  - Refrain from drinking alcoholic beverages for 24hours before and after surgery  - Stop or limit smoking _________days before surgery  - You may take Tylenol for pain  Night before Surgery  - Do not eat or drink after midnight  - Take a shower or bath (shower is recommended).  Bathe with Hibiclens soap or an antibacterial soap from the neck down.  If not supplied by your surgeon, hibiclens soap will need to be purchased over the counter in pharmacy.  Rinse soap off thoroughly.  - Shampoo your hair with your regular  shampoo  The Day of Surgery  - Take another bath or shower with hibiclens or any antibacterial soap, to reduce the chance of infection.  - Take heart and blood pressure medications with a small sip of water, as advised by the perioperative team.  - Do not take fluid pills  - You may brush your teeth and rinse your mouth, but do not swall any additional water.   - Do not apply perfumes, powder, body lotions or deodorant on the day of surgery.  - Nail polish should be removed.  - Do not wear makeup or moisturizer  - Wear comfortable clothes, such as a button front shirt and loose fitting pants.  - Leave all jewelry, including body piercings, and valuables at home.    - Bring any devices you will neeed after surgery such as crutches or canes.  - If you have sleep apnea, please bring your CPAP machine  In the event that your physical condition changes including the onset of a cold or respiratory illness, or if you have to delay or cancel your surgery, please notify your surgeon.   Anesthesia: Regional Anesthesia    Youre scheduled for surgery. During surgery, youll receive medicine called anesthesia to keep you comfortable and pain-free. Your surgeon has decided that youll receive regional anesthesia. This sheet tells you what to expect with this type of anesthesia.  What is regional anesthesia?  Regional anesthesia numbs one region of your body. The anesthesia may be given around nerves or into veins in your arms, neck, or legs (nerve block or Mario block). Or it may be sent into the spinal fluid (spinal anesthesia) or into the space just outside the spinal fluid (epidural anesthesia). You may also be given sedatives to help you relax.  Nerve block or [place] block  A small area of the body, such as an arm or leg, can be numbed using a nerve block or Mario block.  · Nerve block. During a nerve block, your skin is numbed. A needle is then inserted near nerves that serve the area to be numbed. Anesthetic is sent through  the needle.  · IV regional or Plato block. For this type of block, an IV line is put into a vein. The blood flow to the area to be numbed is blocked for a short time. Anesthetic is sent through the IV.  Spinal anesthesia  Spinal anesthesia numbs your body from about the waist down.  · Anesthetic is injected into the spinal fluid. This is a substance that surrounds the spinal cord in your spinal column. The anesthetic blocks pain traveling from the body to the brain.  · To receive the anesthetic, your skin is numbed at the injection site on your back.  · A needle is then inserted into the spinal space. Anesthetic is sent into the spinal fluid through the needle.  Epidural anesthesia  Epidural anesthesia is most commonly used during childbirth and may also be used after surgical procedures of the chest, belly, and legs.  · Anesthetic is injected into the epidural space. This is just outside the dural sac which contains the spinal fluid.  · To receive the anesthetic, your skin is numbed at the injection site on your back.  · A needle is then inserted into the epidural space. Anesthetic is sent into the epidural space through the needle.  · A small flexible catheter may be attached to the needle and left in place. This allows for continuous injections or infusions of anesthetic.  Anesthesia tools and medicines that might be near you during your procedure  · Local anesthetic. This medicine is given through a needle numbs one region of your body.  · Electrocardiography leads (electrodes). These are used to record your heart rate and rhythm.  · Blood pressure cuff. A cuff is placed on your arm to keep track of your blood pressure.  · Pulse oximeter. This small clip is placed on the end of the finger. It measures your blood oxygen level.  · Sedatives. These medicines may be given through an IV. They help to relax you and keep you comfortable. You may stay awake or sleep lightly.  · Oxygen. You may be given oxygen through a  facemask.  Risks and possible complications  Regional anesthesia carries some risks. These include:  · Nausea and vomiting  · Headache  · Backache  · Decreased blood pressure  · Allergic reaction to the anesthetic  · Ongoing numbness (rare)  · Irregular heartbeat (rare)  · Cardiac arrest (rare)   Date Last Reviewed: 12/1/2016  © 0142-3863 [employer]. [address] [place], [place] [address]. All rights reserved. This information is not intended as a substitute for professional medical care. Always follow your healthcare professional's instructions.

## 2019-11-16 DIAGNOSIS — Z96.652 S/P TOTAL KNEE REPLACEMENT USING CEMENT, LEFT: Primary | ICD-10-CM

## 2019-11-16 RX ORDER — ASPIRIN 81 MG/1
81 TABLET ORAL 2 TIMES DAILY
Qty: 60 TABLET | Refills: 0 | Status: ON HOLD | OUTPATIENT
Start: 2019-11-16 | End: 2019-11-20 | Stop reason: HOSPADM

## 2019-11-16 RX ORDER — OXYCODONE HYDROCHLORIDE 5 MG/1
TABLET ORAL
Qty: 50 TABLET | Refills: 0 | Status: SHIPPED | OUTPATIENT
Start: 2019-11-16 | End: 2019-12-03 | Stop reason: SDUPTHER

## 2019-11-16 RX ORDER — DEXTROMETHORPHAN HYDROBROMIDE, GUAIFENESIN 5; 100 MG/5ML; MG/5ML
650 LIQUID ORAL EVERY 8 HOURS PRN
Qty: 120 TABLET | Refills: 0 | Status: SHIPPED | OUTPATIENT
Start: 2019-11-16

## 2019-11-16 RX ORDER — DOCUSATE SODIUM 100 MG/1
100 CAPSULE, LIQUID FILLED ORAL 2 TIMES DAILY PRN
Qty: 60 CAPSULE | Refills: 0 | Status: SHIPPED | OUTPATIENT
Start: 2019-11-16 | End: 2020-04-23

## 2019-11-18 ENCOUNTER — TELEPHONE (OUTPATIENT)
Dept: ORTHOPEDICS | Facility: CLINIC | Age: 69
End: 2019-11-18

## 2019-11-18 NOTE — TELEPHONE ENCOUNTER
Spoke with pt, notified her of her 1100 arrival time for surgery tomorrow at [facility]. Pt verbalized understanding and had no further questions.

## 2019-11-19 ENCOUNTER — ANESTHESIA (OUTPATIENT)
Dept: SURGERY | Facility: HOSPITAL | Age: 69
End: 2019-11-19
Payer: MEDICARE

## 2019-11-19 ENCOUNTER — HOSPITAL ENCOUNTER (OUTPATIENT)
Facility: HOSPITAL | Age: 69
Discharge: HOME | End: 2019-11-20
Attending: ORTHOPAEDIC SURGERY | Admitting: ORTHOPAEDIC SURGERY
Payer: MEDICARE

## 2019-11-19 DIAGNOSIS — M17.12 PRIMARY OSTEOARTHRITIS OF LEFT KNEE: ICD-10-CM

## 2019-11-19 DIAGNOSIS — Z96.652 S/P TOTAL KNEE REPLACEMENT USING CEMENT, LEFT: ICD-10-CM

## 2019-11-19 LAB
POCT GLUCOSE: 102 MG/DL (ref 70–110)
POCT GLUCOSE: 112 MG/DL (ref 70–110)
POCT GLUCOSE: 173 MG/DL (ref 70–110)
POCT GLUCOSE: 81 MG/DL (ref 70–110)

## 2019-11-19 PROCEDURE — 97116 GAIT TRAINING THERAPY: CPT

## 2019-11-19 PROCEDURE — 99900035 HC TECH TIME PER 15 MIN (STAT)

## 2019-11-19 PROCEDURE — 63600175 PHARM REV CODE 636 W HCPCS: Performed by: NURSE PRACTITIONER

## 2019-11-19 PROCEDURE — 25000003 PHARM REV CODE 250: Performed by: NURSE PRACTITIONER

## 2019-11-19 PROCEDURE — 25000003 PHARM REV CODE 250: Performed by: ORTHOPAEDIC SURGERY

## 2019-11-19 PROCEDURE — D9220A PRA ANESTHESIA: ICD-10-PCS | Mod: ANES,,, | Performed by: ANESTHESIOLOGY

## 2019-11-19 PROCEDURE — 63600175 PHARM REV CODE 636 W HCPCS

## 2019-11-19 PROCEDURE — 64448 PR NERVE BLOCK INJ, ANES/STEROID, FEMORAL, CONT INFUSION, INCL IMAG GUIDANCE: ICD-10-PCS | Mod: 59,LT,, | Performed by: ANESTHESIOLOGY

## 2019-11-19 PROCEDURE — D9220A PRA ANESTHESIA: Mod: ANES,,, | Performed by: ANESTHESIOLOGY

## 2019-11-19 PROCEDURE — 64448 NJX AA&/STRD FEM NRV NFS IMG: CPT | Performed by: ANESTHESIOLOGY

## 2019-11-19 PROCEDURE — 76942 PR U/S GUIDANCE FOR NEEDLE GUIDANCE: ICD-10-PCS | Mod: 26,,, | Performed by: ANESTHESIOLOGY

## 2019-11-19 PROCEDURE — 63600175 PHARM REV CODE 636 W HCPCS: Performed by: NURSE ANESTHETIST, CERTIFIED REGISTERED

## 2019-11-19 PROCEDURE — 37000008 HC ANESTHESIA 1ST 15 MINUTES: Performed by: ORTHOPAEDIC SURGERY

## 2019-11-19 PROCEDURE — 25000003 PHARM REV CODE 250: Performed by: STUDENT IN AN ORGANIZED HEALTH CARE EDUCATION/TRAINING PROGRAM

## 2019-11-19 PROCEDURE — 25000003 PHARM REV CODE 250: Performed by: NURSE ANESTHETIST, CERTIFIED REGISTERED

## 2019-11-19 PROCEDURE — D9220A PRA ANESTHESIA: ICD-10-PCS | Mod: CRNA,,, | Performed by: NURSE ANESTHETIST, CERTIFIED REGISTERED

## 2019-11-19 PROCEDURE — 27200750 HC INSULATED NEEDLE/ STIMUPLEX: Performed by: ANESTHESIOLOGY

## 2019-11-19 PROCEDURE — 36000711: Performed by: ORTHOPAEDIC SURGERY

## 2019-11-19 PROCEDURE — 94799 UNLISTED PULMONARY SVC/PX: CPT

## 2019-11-19 PROCEDURE — 27447 TOTAL KNEE ARTHROPLASTY: CPT | Mod: LT,,, | Performed by: ORTHOPAEDIC SURGERY

## 2019-11-19 PROCEDURE — 64450 NJX AA&/STRD OTHER PN/BRANCH: CPT | Mod: 59,LT,, | Performed by: ANESTHESIOLOGY

## 2019-11-19 PROCEDURE — C1713 ANCHOR/SCREW BN/BN,TIS/BN: HCPCS | Performed by: ORTHOPAEDIC SURGERY

## 2019-11-19 PROCEDURE — 64450 PR NERVE BLOCK INJ, ANES/STEROID, OTHER PERIPHERAL: ICD-10-PCS | Mod: 59,LT,, | Performed by: ANESTHESIOLOGY

## 2019-11-19 PROCEDURE — C1776 JOINT DEVICE (IMPLANTABLE): HCPCS | Performed by: ORTHOPAEDIC SURGERY

## 2019-11-19 PROCEDURE — 76942 ECHO GUIDE FOR BIOPSY: CPT | Performed by: ANESTHESIOLOGY

## 2019-11-19 PROCEDURE — 27447 PR TOTAL KNEE ARTHROPLASTY: ICD-10-PCS | Mod: LT,,, | Performed by: ORTHOPAEDIC SURGERY

## 2019-11-19 PROCEDURE — 36000710: Performed by: ORTHOPAEDIC SURGERY

## 2019-11-19 PROCEDURE — C1751 CATH, INF, PER/CENT/MIDLINE: HCPCS | Performed by: ANESTHESIOLOGY

## 2019-11-19 PROCEDURE — 63600175 PHARM REV CODE 636 W HCPCS: Performed by: ANESTHESIOLOGY

## 2019-11-19 PROCEDURE — 64448 NJX AA&/STRD FEM NRV NFS IMG: CPT | Mod: 59,LT,, | Performed by: ANESTHESIOLOGY

## 2019-11-19 PROCEDURE — 94761 N-INVAS EAR/PLS OXIMETRY MLT: CPT

## 2019-11-19 PROCEDURE — 71000033 HC RECOVERY, INTIAL HOUR: Performed by: ORTHOPAEDIC SURGERY

## 2019-11-19 PROCEDURE — 76942 ECHO GUIDE FOR BIOPSY: CPT | Mod: 26,,, | Performed by: ANESTHESIOLOGY

## 2019-11-19 PROCEDURE — 27201423 OPTIME MED/SURG SUP & DEVICES STERILE SUPPLY: Performed by: ORTHOPAEDIC SURGERY

## 2019-11-19 PROCEDURE — D9220A PRA ANESTHESIA: Mod: CRNA,,, | Performed by: NURSE ANESTHETIST, CERTIFIED REGISTERED

## 2019-11-19 PROCEDURE — 97161 PT EVAL LOW COMPLEX 20 MIN: CPT

## 2019-11-19 PROCEDURE — 63600175 PHARM REV CODE 636 W HCPCS: Performed by: ORTHOPAEDIC SURGERY

## 2019-11-19 PROCEDURE — 37000009 HC ANESTHESIA EA ADD 15 MINS: Performed by: ORTHOPAEDIC SURGERY

## 2019-11-19 PROCEDURE — 25000003 PHARM REV CODE 250: Performed by: ANESTHESIOLOGY

## 2019-11-19 PROCEDURE — 71000039 HC RECOVERY, EACH ADD'L HOUR: Performed by: ORTHOPAEDIC SURGERY

## 2019-11-19 DEVICE — CEMENT BONE SMPLX HV GENTMYCN: Type: IMPLANTABLE DEVICE | Site: KNEE | Status: FUNCTIONAL

## 2019-11-19 DEVICE — COMP FEM POST STAB CEM SZ 4 LT: Type: IMPLANTABLE DEVICE | Site: KNEE | Status: FUNCTIONAL

## 2019-11-19 DEVICE — BASEPLATE TIB CEM PRIM SZ 4: Type: IMPLANTABLE DEVICE | Site: KNEE | Status: FUNCTIONAL

## 2019-11-19 DEVICE — INSERT TRIATHLON SZ4 11MM: Type: IMPLANTABLE DEVICE | Site: KNEE | Status: FUNCTIONAL

## 2019-11-19 DEVICE — PATELLA TRIATHLON 31X9 SYMTRC: Type: IMPLANTABLE DEVICE | Site: KNEE | Status: FUNCTIONAL

## 2019-11-19 RX ORDER — GLUCAGON 1 MG
1 KIT INJECTION
Status: DISCONTINUED | OUTPATIENT
Start: 2019-11-19 | End: 2019-11-20 | Stop reason: HOSPADM

## 2019-11-19 RX ORDER — INSULIN ASPART 100 [IU]/ML
1-10 INJECTION, SOLUTION INTRAVENOUS; SUBCUTANEOUS
Status: DISCONTINUED | OUTPATIENT
Start: 2019-11-19 | End: 2019-11-20 | Stop reason: HOSPADM

## 2019-11-19 RX ORDER — PHENYLEPHRINE HYDROCHLORIDE 10 MG/ML
INJECTION INTRAVENOUS
Status: DISCONTINUED | OUTPATIENT
Start: 2019-11-19 | End: 2019-11-19

## 2019-11-19 RX ORDER — MORPHINE SULFATE 2 MG/ML
2 INJECTION, SOLUTION INTRAMUSCULAR; INTRAVENOUS
Status: DISCONTINUED | OUTPATIENT
Start: 2019-11-19 | End: 2019-11-20 | Stop reason: HOSPADM

## 2019-11-19 RX ORDER — OXYCODONE HYDROCHLORIDE 5 MG/1
10 TABLET ORAL
Status: DISCONTINUED | OUTPATIENT
Start: 2019-11-19 | End: 2019-11-20 | Stop reason: HOSPADM

## 2019-11-19 RX ORDER — ASPIRIN 81 MG/1
81 TABLET ORAL 2 TIMES DAILY
Status: DISCONTINUED | OUTPATIENT
Start: 2019-11-19 | End: 2019-11-19

## 2019-11-19 RX ORDER — EPHEDRINE SULFATE 50 MG/ML
INJECTION, SOLUTION INTRAVENOUS
Status: DISCONTINUED | OUTPATIENT
Start: 2019-11-19 | End: 2019-11-19

## 2019-11-19 RX ORDER — ROPIVACAINE HYDROCHLORIDE 5 MG/ML
INJECTION, SOLUTION EPIDURAL; INFILTRATION; PERINEURAL
Status: COMPLETED
Start: 2019-11-19 | End: 2019-11-19

## 2019-11-19 RX ORDER — FENTANYL CITRATE 50 UG/ML
25 INJECTION, SOLUTION INTRAMUSCULAR; INTRAVENOUS EVERY 5 MIN PRN
Status: DISCONTINUED | OUTPATIENT
Start: 2019-11-19 | End: 2019-11-19 | Stop reason: HOSPADM

## 2019-11-19 RX ORDER — BISACODYL 10 MG
10 SUPPOSITORY, RECTAL RECTAL EVERY 12 HOURS PRN
Status: DISCONTINUED | OUTPATIENT
Start: 2019-11-19 | End: 2019-11-20 | Stop reason: HOSPADM

## 2019-11-19 RX ORDER — ROPIVACAINE HYDROCHLORIDE 2 MG/ML
INJECTION, SOLUTION EPIDURAL; INFILTRATION; PERINEURAL
Status: COMPLETED
Start: 2019-11-19 | End: 2019-11-19

## 2019-11-19 RX ORDER — RAMELTEON 8 MG/1
8 TABLET ORAL NIGHTLY PRN
Status: DISCONTINUED | OUTPATIENT
Start: 2019-11-19 | End: 2019-11-20 | Stop reason: HOSPADM

## 2019-11-19 RX ORDER — OXYCODONE HYDROCHLORIDE 5 MG/1
5 TABLET ORAL
Status: DISCONTINUED | OUTPATIENT
Start: 2019-11-19 | End: 2019-11-19 | Stop reason: HOSPADM

## 2019-11-19 RX ORDER — ONDANSETRON 2 MG/ML
4 INJECTION INTRAMUSCULAR; INTRAVENOUS DAILY PRN
Status: DISCONTINUED | OUTPATIENT
Start: 2019-11-19 | End: 2019-11-19 | Stop reason: HOSPADM

## 2019-11-19 RX ORDER — LISINOPRIL AND HYDROCHLOROTHIAZIDE 20; 25 MG/1; MG/1
1 TABLET ORAL DAILY
Status: DISCONTINUED | OUTPATIENT
Start: 2019-11-20 | End: 2019-11-20

## 2019-11-19 RX ORDER — MIDAZOLAM HYDROCHLORIDE 1 MG/ML
INJECTION, SOLUTION INTRAMUSCULAR; INTRAVENOUS
Status: DISCONTINUED | OUTPATIENT
Start: 2019-11-19 | End: 2019-11-19

## 2019-11-19 RX ORDER — SODIUM CHLORIDE 9 MG/ML
INJECTION, SOLUTION INTRAVENOUS
Status: COMPLETED | OUTPATIENT
Start: 2019-11-19 | End: 2019-11-19

## 2019-11-19 RX ORDER — CELECOXIB 200 MG/1
400 CAPSULE ORAL
Status: DISCONTINUED | OUTPATIENT
Start: 2019-11-19 | End: 2019-11-19

## 2019-11-19 RX ORDER — BUPIVACAINE HYDROCHLORIDE AND EPINEPHRINE 2.5; 5 MG/ML; UG/ML
INJECTION, SOLUTION EPIDURAL; INFILTRATION; INTRACAUDAL; PERINEURAL
Status: COMPLETED | OUTPATIENT
Start: 2019-11-19 | End: 2019-11-19

## 2019-11-19 RX ORDER — BUPIVACAINE HYDROCHLORIDE 2.5 MG/ML
INJECTION, SOLUTION EPIDURAL; INFILTRATION; INTRACAUDAL
Status: DISPENSED
Start: 2019-11-19 | End: 2019-11-19

## 2019-11-19 RX ORDER — MIDAZOLAM HYDROCHLORIDE 1 MG/ML
1 INJECTION INTRAMUSCULAR; INTRAVENOUS EVERY 5 MIN PRN
Status: DISCONTINUED | OUTPATIENT
Start: 2019-11-19 | End: 2019-11-19 | Stop reason: HOSPADM

## 2019-11-19 RX ORDER — ROPIVACAINE HYDROCHLORIDE 5 MG/ML
INJECTION, SOLUTION EPIDURAL; INFILTRATION; PERINEURAL
Status: COMPLETED | OUTPATIENT
Start: 2019-11-19 | End: 2019-11-19

## 2019-11-19 RX ORDER — POLYETHYLENE GLYCOL 3350 17 G/17G
17 POWDER, FOR SOLUTION ORAL DAILY
Status: DISCONTINUED | OUTPATIENT
Start: 2019-11-20 | End: 2019-11-20 | Stop reason: HOSPADM

## 2019-11-19 RX ORDER — CEFAZOLIN SODIUM 1 G/3ML
2 INJECTION, POWDER, FOR SOLUTION INTRAMUSCULAR; INTRAVENOUS
Status: DISPENSED | OUTPATIENT
Start: 2019-11-19 | End: 2019-11-20

## 2019-11-19 RX ORDER — MUPIROCIN 20 MG/G
1 OINTMENT TOPICAL
Status: COMPLETED | OUTPATIENT
Start: 2019-11-19 | End: 2019-11-19

## 2019-11-19 RX ORDER — PROPOFOL 10 MG/ML
VIAL (ML) INTRAVENOUS CONTINUOUS PRN
Status: DISCONTINUED | OUTPATIENT
Start: 2019-11-19 | End: 2019-11-19

## 2019-11-19 RX ORDER — ROPIVACAINE HYDROCHLORIDE 2 MG/ML
8 INJECTION, SOLUTION EPIDURAL; INFILTRATION; PERINEURAL CONTINUOUS
Status: DISCONTINUED | OUTPATIENT
Start: 2019-11-19 | End: 2019-11-20 | Stop reason: HOSPADM

## 2019-11-19 RX ORDER — OXYCODONE HYDROCHLORIDE 5 MG/1
5 TABLET ORAL
Status: DISCONTINUED | OUTPATIENT
Start: 2019-11-19 | End: 2019-11-20 | Stop reason: HOSPADM

## 2019-11-19 RX ORDER — OXYCODONE HYDROCHLORIDE 5 MG/1
15 TABLET ORAL
Status: DISCONTINUED | OUTPATIENT
Start: 2019-11-19 | End: 2019-11-20 | Stop reason: HOSPADM

## 2019-11-19 RX ORDER — FAMOTIDINE 20 MG/1
20 TABLET, FILM COATED ORAL 2 TIMES DAILY
Status: DISCONTINUED | OUTPATIENT
Start: 2019-11-19 | End: 2019-11-20 | Stop reason: HOSPADM

## 2019-11-19 RX ORDER — SODIUM CHLORIDE 0.9 % (FLUSH) 0.9 %
10 SYRINGE (ML) INJECTION
Status: DISCONTINUED | OUTPATIENT
Start: 2019-11-19 | End: 2019-11-19 | Stop reason: HOSPADM

## 2019-11-19 RX ORDER — ROPIVACAINE HYDROCHLORIDE 2 MG/ML
8 INJECTION, SOLUTION EPIDURAL; INFILTRATION; PERINEURAL CONTINUOUS
Status: DISCONTINUED | OUTPATIENT
Start: 2019-11-19 | End: 2019-11-19

## 2019-11-19 RX ORDER — IBUPROFEN 200 MG
24 TABLET ORAL
Status: DISCONTINUED | OUTPATIENT
Start: 2019-11-19 | End: 2019-11-20 | Stop reason: HOSPADM

## 2019-11-19 RX ORDER — ACETAMINOPHEN 500 MG
1000 TABLET ORAL ONCE
Status: COMPLETED | OUTPATIENT
Start: 2019-11-19 | End: 2019-11-19

## 2019-11-19 RX ORDER — SODIUM CHLORIDE 9 MG/ML
INJECTION, SOLUTION INTRAVENOUS CONTINUOUS
Status: DISCONTINUED | OUTPATIENT
Start: 2019-11-19 | End: 2019-11-20 | Stop reason: HOSPADM

## 2019-11-19 RX ORDER — NALOXONE HCL 0.4 MG/ML
0.02 VIAL (ML) INJECTION
Status: DISCONTINUED | OUTPATIENT
Start: 2019-11-19 | End: 2019-11-20 | Stop reason: HOSPADM

## 2019-11-19 RX ORDER — AMOXICILLIN 250 MG
1 CAPSULE ORAL 2 TIMES DAILY
Status: DISCONTINUED | OUTPATIENT
Start: 2019-11-19 | End: 2019-11-20 | Stop reason: HOSPADM

## 2019-11-19 RX ORDER — CEFAZOLIN SODIUM 1 G/3ML
2 INJECTION, POWDER, FOR SOLUTION INTRAMUSCULAR; INTRAVENOUS
Status: COMPLETED | OUTPATIENT
Start: 2019-11-19 | End: 2019-11-19

## 2019-11-19 RX ORDER — ONDANSETRON 2 MG/ML
4 INJECTION INTRAMUSCULAR; INTRAVENOUS EVERY 8 HOURS PRN
Status: DISCONTINUED | OUTPATIENT
Start: 2019-11-19 | End: 2019-11-20 | Stop reason: HOSPADM

## 2019-11-19 RX ORDER — TOPIRAMATE 25 MG/1
50 TABLET ORAL 2 TIMES DAILY
Status: DISCONTINUED | OUTPATIENT
Start: 2019-11-19 | End: 2019-11-20 | Stop reason: HOSPADM

## 2019-11-19 RX ORDER — IBUPROFEN 200 MG
16 TABLET ORAL
Status: DISCONTINUED | OUTPATIENT
Start: 2019-11-19 | End: 2019-11-20 | Stop reason: HOSPADM

## 2019-11-19 RX ORDER — KETAMINE HCL IN 0.9 % NACL 50 MG/5 ML
SYRINGE (ML) INTRAVENOUS
Status: DISCONTINUED | OUTPATIENT
Start: 2019-11-19 | End: 2019-11-19

## 2019-11-19 RX ORDER — MUPIROCIN 20 MG/G
1 OINTMENT TOPICAL 2 TIMES DAILY
Status: DISCONTINUED | OUTPATIENT
Start: 2019-11-19 | End: 2019-11-20 | Stop reason: HOSPADM

## 2019-11-19 RX ORDER — PREGABALIN 75 MG/1
75 CAPSULE ORAL NIGHTLY
Status: DISCONTINUED | OUTPATIENT
Start: 2019-11-19 | End: 2019-11-20 | Stop reason: HOSPADM

## 2019-11-19 RX ORDER — SODIUM CHLORIDE 9 MG/ML
INJECTION, SOLUTION INTRAVENOUS CONTINUOUS PRN
Status: DISCONTINUED | OUTPATIENT
Start: 2019-11-19 | End: 2019-11-19

## 2019-11-19 RX ORDER — LIDOCAINE HCL/PF 100 MG/5ML
SYRINGE (ML) INTRAVENOUS
Status: DISCONTINUED | OUTPATIENT
Start: 2019-11-19 | End: 2019-11-19

## 2019-11-19 RX ORDER — MIDAZOLAM HYDROCHLORIDE 1 MG/ML
0.5 INJECTION INTRAMUSCULAR; INTRAVENOUS
Status: DISCONTINUED | OUTPATIENT
Start: 2019-11-19 | End: 2019-11-19 | Stop reason: HOSPADM

## 2019-11-19 RX ORDER — FENTANYL CITRATE 50 UG/ML
100 INJECTION, SOLUTION INTRAMUSCULAR; INTRAVENOUS ONCE
Status: COMPLETED | OUTPATIENT
Start: 2019-11-19 | End: 2019-11-19

## 2019-11-19 RX ORDER — LIDOCAINE HYDROCHLORIDE 10 MG/ML
1 INJECTION, SOLUTION EPIDURAL; INFILTRATION; INTRACAUDAL; PERINEURAL
Status: DISCONTINUED | OUTPATIENT
Start: 2019-11-19 | End: 2019-11-19 | Stop reason: HOSPADM

## 2019-11-19 RX ORDER — AMLODIPINE BESYLATE 10 MG/1
10 TABLET ORAL DAILY
Status: DISCONTINUED | OUTPATIENT
Start: 2019-11-20 | End: 2019-11-20 | Stop reason: HOSPADM

## 2019-11-19 RX ORDER — ONDANSETRON 2 MG/ML
INJECTION INTRAMUSCULAR; INTRAVENOUS
Status: DISCONTINUED | OUTPATIENT
Start: 2019-11-19 | End: 2019-11-19

## 2019-11-19 RX ORDER — SODIUM CHLORIDE 0.9 % (FLUSH) 0.9 %
10 SYRINGE (ML) INJECTION
Status: DISCONTINUED | OUTPATIENT
Start: 2019-11-19 | End: 2019-11-20 | Stop reason: HOSPADM

## 2019-11-19 RX ORDER — DEXAMETHASONE SODIUM PHOSPHATE 4 MG/ML
INJECTION, SOLUTION INTRA-ARTICULAR; INTRALESIONAL; INTRAMUSCULAR; INTRAVENOUS; SOFT TISSUE
Status: DISCONTINUED | OUTPATIENT
Start: 2019-11-19 | End: 2019-11-19

## 2019-11-19 RX ORDER — OXYBUTYNIN CHLORIDE 5 MG/1
5 TABLET ORAL NIGHTLY
Status: DISCONTINUED | OUTPATIENT
Start: 2019-11-19 | End: 2019-11-20 | Stop reason: HOSPADM

## 2019-11-19 RX ORDER — ACETAMINOPHEN 500 MG
1000 TABLET ORAL EVERY 6 HOURS
Status: DISCONTINUED | OUTPATIENT
Start: 2019-11-19 | End: 2019-11-20 | Stop reason: HOSPADM

## 2019-11-19 RX ORDER — PREGABALIN 75 MG/1
75 CAPSULE ORAL
Status: COMPLETED | OUTPATIENT
Start: 2019-11-19 | End: 2019-11-19

## 2019-11-19 RX ADMIN — OXYCODONE HYDROCHLORIDE 5 MG: 5 TABLET ORAL at 06:11

## 2019-11-19 RX ADMIN — PROPOFOL 85 MCG/KG/MIN: 10 INJECTION, EMULSION INTRAVENOUS at 02:11

## 2019-11-19 RX ADMIN — LIDOCAINE HYDROCHLORIDE 60 MG: 20 INJECTION, SOLUTION INTRAVENOUS at 02:11

## 2019-11-19 RX ADMIN — EPHEDRINE SULFATE 5 MG: 50 INJECTION INTRAVENOUS at 02:11

## 2019-11-19 RX ADMIN — SENNOSIDES,DOCUSATE SODIUM 1 TABLET: 8.6; 5 TABLET, FILM COATED ORAL at 09:11

## 2019-11-19 RX ADMIN — PHENYLEPHRINE HYDROCHLORIDE 100 MCG: 10 INJECTION INTRAVENOUS at 02:11

## 2019-11-19 RX ADMIN — MEPIVACAINE HYDROCHLORIDE 3 ML: 15 INJECTION, SOLUTION EPIDURAL; INFILTRATION at 02:11

## 2019-11-19 RX ADMIN — EPHEDRINE SULFATE 10 MG: 50 INJECTION INTRAVENOUS at 02:11

## 2019-11-19 RX ADMIN — FENTANYL CITRATE 50 MCG: 50 INJECTION INTRAMUSCULAR; INTRAVENOUS at 12:11

## 2019-11-19 RX ADMIN — SODIUM CHLORIDE: 0.9 INJECTION, SOLUTION INTRAVENOUS at 12:11

## 2019-11-19 RX ADMIN — ACETAMINOPHEN 1000 MG: 500 TABLET ORAL at 12:11

## 2019-11-19 RX ADMIN — MUPIROCIN 1 G: 20 OINTMENT TOPICAL at 12:11

## 2019-11-19 RX ADMIN — MUPIROCIN 1 G: 20 OINTMENT TOPICAL at 09:11

## 2019-11-19 RX ADMIN — SODIUM CHLORIDE: 0.9 INJECTION, SOLUTION INTRAVENOUS at 03:11

## 2019-11-19 RX ADMIN — OXYCODONE HYDROCHLORIDE 10 MG: 5 TABLET ORAL at 11:11

## 2019-11-19 RX ADMIN — SODIUM CHLORIDE: 0.9 INJECTION, SOLUTION INTRAVENOUS at 01:11

## 2019-11-19 RX ADMIN — ACETAMINOPHEN 1000 MG: 500 TABLET ORAL at 11:11

## 2019-11-19 RX ADMIN — SODIUM CHLORIDE: 0.9 INJECTION, SOLUTION INTRAVENOUS at 09:11

## 2019-11-19 RX ADMIN — ROPIVACAINE HYDROCHLORIDE 8 ML/HR: 2 INJECTION, SOLUTION EPIDURAL; INFILTRATION at 03:11

## 2019-11-19 RX ADMIN — Medication 20 MG: at 02:11

## 2019-11-19 RX ADMIN — PREGABALIN 75 MG: 75 CAPSULE ORAL at 09:11

## 2019-11-19 RX ADMIN — PREGABALIN 75 MG: 75 CAPSULE ORAL at 12:11

## 2019-11-19 RX ADMIN — ROPIVACAINE HYDROCHLORIDE 15 ML: 5 INJECTION, SOLUTION EPIDURAL; INFILTRATION; PERINEURAL at 01:11

## 2019-11-19 RX ADMIN — OXYBUTYNIN CHLORIDE 5 MG: 5 TABLET ORAL at 09:11

## 2019-11-19 RX ADMIN — VANCOMYCIN HYDROCHLORIDE 1500 MG: 1.5 INJECTION, POWDER, LYOPHILIZED, FOR SOLUTION INTRAVENOUS at 12:11

## 2019-11-19 RX ADMIN — BUPIVACAINE HYDROCHLORIDE AND EPINEPHRINE 20 ML: 2.5; 5 INJECTION, SOLUTION EPIDURAL; INFILTRATION; INTRACAUDAL; PERINEURAL at 01:11

## 2019-11-19 RX ADMIN — DEXAMETHASONE SODIUM PHOSPHATE 8 MG: 4 INJECTION, SOLUTION INTRAMUSCULAR; INTRAVENOUS at 02:11

## 2019-11-19 RX ADMIN — ACETAMINOPHEN 1000 MG: 500 TABLET ORAL at 06:11

## 2019-11-19 RX ADMIN — MIDAZOLAM 1 MG: 1 INJECTION INTRAMUSCULAR; INTRAVENOUS at 01:11

## 2019-11-19 RX ADMIN — CEFAZOLIN 2 G: 1 INJECTION, POWDER, FOR SOLUTION INTRAMUSCULAR; INTRAVENOUS at 09:11

## 2019-11-19 RX ADMIN — SODIUM CHLORIDE, SODIUM GLUCONATE, SODIUM ACETATE, POTASSIUM CHLORIDE, MAGNESIUM CHLORIDE, SODIUM PHOSPHATE, DIBASIC, AND POTASSIUM PHOSPHATE: .53; .5; .37; .037; .03; .012; .00082 INJECTION, SOLUTION INTRAVENOUS at 02:11

## 2019-11-19 RX ADMIN — ONDANSETRON 4 MG: 2 INJECTION INTRAMUSCULAR; INTRAVENOUS at 02:11

## 2019-11-19 RX ADMIN — MIDAZOLAM HYDROCHLORIDE 1 MG: 1 INJECTION, SOLUTION INTRAMUSCULAR; INTRAVENOUS at 12:11

## 2019-11-19 RX ADMIN — FAMOTIDINE 20 MG: 20 TABLET, FILM COATED ORAL at 09:11

## 2019-11-19 RX ADMIN — CEFAZOLIN 2 G: 330 INJECTION, POWDER, FOR SOLUTION INTRAMUSCULAR; INTRAVENOUS at 02:11

## 2019-11-19 NOTE — OP NOTE
DATE OF PROCEDURE: 11/19/19  PREOPERATIVE DIAGNOSIS: Arthritis, Left knee. Morbid Obesity, BMI 41  POSTOPERATIVE DIAGNOSIS: Arthritis, Left knee. Morbid Obesity, BMI 41  PROCEDURES PERFORMED: Left total knee arthroplasty.   SURGEON: Raffaele Saucedo M.D.   ASSISTANT: ARIAS Valentin MD  ANESTHESIA: Regional.   COMPLICATIONS: None.   COUNTS: Correct.   DISPOSITION: Recovery Room, stable.   SPECIMENS: Bone and cartilage.   FINDINGS: Tricompartmental degenerative change. .   FLUIDS: 2000 mL.   BLOOD LOSS: Less than 50 mL.   IMPLANTS: Erick Triathlon, size 4 Left posterior stabilized   cemented femoral component with a 4 cemented tibial tray, a 31 mm 3-peg   patella and a size 4, 11 mm posterior stabilized polyethylene insert.   INDICATIONS FOR PROCEDURE: Kamla Espinoza is a 69-year-old female who has   severe arthritis in the Left knee . She is having continued pain in the   Left knee and did not respond to nonoperative measures, decided to undergo   Left knee replacement. She is aware of reasonable treatment options as   well as risks and benefits. She did not respond to NSAIDS or injections. She received a course or pre-operative physical therapy.  PROCEDURE IN DETAIL: After appropriate consent was obtained, the patient   Was brought in the Operating Room, anesthesia was administered. She received   antibiotic prophylaxis. Cast   padding and tourniquet was applied to the proximal Left thigh. Left  lower extremity was prepped and draped in usual sterile fashion. Limb was   elevated, tourniquet was inflated. The knee was flexed. An incision was   made from the tibial tubercle just proximal to the superior pole of the   patella. It was taken down through the skin and retinacular. A medial   parapatellar arthrotomy was performed followed by a standard medial   release. Patellar fat pad was partially excised. ACL was resected.   Femoral punch was used to make the guide hole for the femoral drill. The   distal cutting  guide was set at 6 degrees valgus left.A standard distal femoral cut was made.We were pleased with the alignment and quality of the cut.  The PCL retractor was used to bring   the tibia into the field. Meniscal remnants were resected. The   extramedullary tibial guide was pinned in place using the medial side, as most   defective, 2 mm bone was taken off of this. We checked the alignment in   both planes both before and after making the cut. We were satisfied with the   alignment of the cut and the amount of bone removed.The femur was then  sized, found to be a size 4. A size 4 cutting guide was pinned in 3   degrees of external rotation. This was based off the posterior condyle,   checked the transepicondylar axis. Anterior, posterior and chamfer cuts   were made. The box guide was pinned in position. Femoral box cut was   made. Bone fragments were removed. Lamina spreaders were   then used to open up posteriorly. The PCL was resected and some soft   tissue debris was removed.  An11 mm spacer block gave excellent flexion-extension gap balance.   I was also satisfied with the medial and lateral stability. At this   point, the femoral trial was placed. The tibia was sized, found to be a   Size 4. A size 4 tibial trial was pinned in appropriate alignment and   rotation. This was based on the medial one third of the tibial tubercle.  A stem extension hole was drilled. A keel hole was punched and a   trial reduction was performed with a size 4, 11 mm insert. She  achieved full   extension. There was no recurvatum. She easily had 130 degrees of flexion.   The femoral component ranged symmetrically in the tibial tray. There was   no lift off. There was no instability of full extension, 30 degrees of   flexion, mid flexion and full flexion. Patella was measured and found to   be 24 mm thick, 8 mm of bone removed. The 3-peg holes were drilled 31 mm   3-peg trial was placed. The knee was brought through range of motion. The    patella tracked extremely well. Therefore, at this point, we were   satisfied with knee range of motion and stability, component position,   sizing and alignment as well as patella tracking. All trial components   were removed. Bone was prepared for cementing by pulsatile lavage and   drying. Components were cemented into place, femur followed by tibia.   Meticulous care was taken to remove excess cement. Tibial insert was   firmly seated in the tibial tray. The knee was inspected. There was no   loose body, foreign body or soft tissue interposition. Knee was then   reduced, brought into extension. Patella button was cemented in place.   Excess cement was removed. The wound was then copiously irrigated with   antibiotic-impregnated solution. Once the cement was dried, tranexamic   acid was applied. The arthrotomy was closed with #1 Vicryl. Once the   arthrotomy was closed, the knee was brought through range of motion, stable   as previously described. Patella tracked very well. Retinacular was   closed with 0 Vicryl, subcutaneous layer with 2-0 Vicryl, skin with   monocryl and dermabond. Sterile dressing was applied. Tourniquet was let down.  She was   transferred to a stretcher, brought to Recovery Room in stable condition,   tolerated the procedure well, no known complications.

## 2019-11-19 NOTE — NURSING TRANSFER
Nursing Transfer Note      11/19/2019     Transfer To: 312    Transfer via bed    Transported by RN x 2    Medicines sent: mupirocin, pnc @ 8, MIVF @ 150    Chart send with patient: Yes    Notified: daughter via text message     Patient reassessed at: 1700 11/19    Report given to Derick HOLDER

## 2019-11-19 NOTE — TRANSFER OF CARE
"Anesthesia Transfer of Care Note    Patient: Kamla Espinoza    Procedure(s) Performed: Procedure(s) (LRB):  ARTHROPLASTY, KNEE, TOTAL (Left)    Patient location: PACU    Anesthesia Type: spinal    Transport from OR: Transported from OR on 6-10 L/min O2 by face mask with adequate spontaneous ventilation    Post pain: adequate analgesia    Post assessment: no apparent anesthetic complications    Post vital signs: stable    Level of consciousness: awake    Nausea/Vomiting: no nausea/vomiting    Complications: none    Transfer of care protocol was followed      Last vitals:   Visit Vitals  BP (!) 97/50 (BP Location: Left arm, Patient Position: Lying)   Pulse 83   Temp 36.5 °C (97.7 °F) (Oral)   Resp 16   Ht 5' 1.5" (1.562 m)   Wt 98 kg (216 lb)   SpO2 97%   Breastfeeding? No   BMI 40.15 kg/m²     "

## 2019-11-19 NOTE — BRIEF OP NOTE
[facility]  Brief Operative Note    SUMMARY     Surgery Date: 11/19/2019     Surgeon(s) and Role:     * Raffaele Saucedo MD - Primary    Assisting Surgeon: None    Pre-op Diagnosis:  Primary osteoarthritis of left knee [M17.12]    Post-op Diagnosis:  Post-Op Diagnosis Codes:     * Primary osteoarthritis of left knee [M17.12]    Procedure(s) (LRB):  ARTHROPLASTY, KNEE, TOTAL (Left)    Anesthesia: Regional    Description of Procedure: L TKA    Description of the findings of the procedure: L TKA    Estimated Blood Loss: * No values recorded between 11/19/2019 12:00 AM and 11/19/2019  1:48 PM *         Specimens:   Specimen (12h ago, onward)    None

## 2019-11-19 NOTE — INTERVAL H&P NOTE
Kamla MARQUEZ Alexis was interviewed, examined and the H and P reviewed.  There has been no interval change in her History and Physical.

## 2019-11-19 NOTE — PLAN OF CARE
[facility] HOME HEALTH ORDERS  FACE TO FACE ENCOUNTER    Patient Name: Kamla Espinoza  YOB: 1950    PCP: Shannan Wilson MD   PCP Address: [address] [facility] / *  PCP Phone Number: [phone]  PCP Fax: [phone]    Encounter Date: 11/19/2019    Admit to Home Health    Diagnoses:  Active Hospital Problems    Diagnosis  POA    S/P total knee replacement using cement, left 11/19/2019 [Z96.652]  Not Applicable      Resolved Hospital Problems   No resolved problems to display.       Future Appointments   Date Time Provider Department Center   11/21/2019  2:00 PM Drew Dubose, PT [facility]   12/3/2019  9:15 AM Gifty Lawrence NP [facility] [address]THO [address]   2/3/2020  2:45 PM Susan Gore MD [facility] BAR[facility]AT [address]           I have seen and examined this patient face to face today. My clinical findings that support the need for the home health skilled services and home bound status are the following:  Weakness/numbness causing balance and gait disturbance due to Joint Replacement making it taxing to leave home.    Allergies:Review of patient's allergies indicates:  No Known Allergies    Diet: regular diet    Activities: activity as tolerated    Nursing:   SN to complete comprehensive assessment including routine vital signs. Instruct on disease process and s/s of complications to report to MD. Follow specific home health arthoplasty protocol. Review/verify medication list sent home with the patient at time of discharge  and instruct patient/caregiver as needed. If coumadin ordered, coumadin clinic to manage INR with INR draws 2x per week with a goal to maintain INR between 1.8 and 2.2. Frequency may be adjusted depending on start of care date.    Notify MD if SBP > 160 or < 90; DBP > 90 or < 50; HR > 120 or < 50; Temp > 101    Home Medical Equipment:  Walker, 3-1 bedside commode, transfer tub bench    CONSULTS:    Physical  Therapy to evaluate and treat. Evaluate for home safety and equipment needs; Establish/upgrade home exercise program. Perform / instruct on therapeutic exercises, gait training, transfer training, and Range of Motion.    OTHER:  Occupational Therapy to evaluate and treat. Evaluate home environment for safety and equipment needs. Perform/Instruct on transfers, ADL training, ROM, and therapeutic exercises.   to evaluate for community resources/long-range planning.  Aide to provide assistance with personal care, ADLs, and vital signs.      WOUND CARE ORDERS  Do not remove surgical dressing for 2 weeks post-op. This will be done only by MD at initial post-op visit. If dressing is completely saturated, replace with identical dressing - silver-impregnated hydrocolloid dressing.     Do not get dressings wet. Do not shower.     If dressing continues to be saturated or there are signs of infection, please call [facility] Clinic [phone] for further instructions and to make appt to be seen.       Medications: Review discharge medications with patient and family and provide education.      Current Discharge Medication List      CONTINUE these medications which have NOT CHANGED    Details   amlodipine (NORVASC) 5 MG tablet Take 10 mg by mouth once daily.       !! aspirin (ECOTRIN) 81 MG EC tablet Take 81 mg by mouth once daily.      !! aspirin (ECOTRIN) 81 MG EC tablet Take 1 tablet (81 mg total) by mouth 2 (two) times daily.  Qty: 60 tablet, Refills: 0    Associated Diagnoses: S/P total knee replacement using cement, left      calcium carbonate 300 mg (750 mg) Chew Take by mouth. tums for acid reflux      diclofenac sodium 1 % Gel Apply 2 g topically as needed.       fluticasone propionate (FLONASE) 50 mcg/actuation nasal spray 1 spray by Nasal route.      ibuprofen (ADVIL,MOTRIN) 800 MG tablet Take 800 mg by mouth daily as needed for Pain.      lisinopril-hydrochlorothiazide (PRINZIDE,ZESTORETIC) 20-25 mg Tab Take  1 tablet by mouth once daily.      metformin (GLUCOPHAGE) 500 MG tablet Take 500 mg by mouth 2 (two) times daily with meals.      OXYBUTYNIN CHLORIDE ORAL Take by mouth every evening.      topiramate (TOPAMAX) 50 MG tablet Take 1 tablet (50 mg total) by mouth 2 (two) times daily.  Qty: 60 tablet, Refills: 3      tramadol (ULTRAM) 50 mg tablet Take 50 mg by mouth every 6 (six) hours as needed for Pain.      ACCU-CHEK JHONATHAN CONTROL SOLN Soln       ACCU-CHEK JHONATHAN PLUS TEST STRP Strp       ACCU-CHEK SOFTCLIX LANCETS Misc       acetaminophen (TYLENOL) 650 MG TbSR Take 1 tablet (650 mg total) by mouth every 8 (eight) hours as needed.  Qty: 120 tablet, Refills: 0    Associated Diagnoses: S/P total knee replacement using cement, left      BD ALCOHOL SWABS PadM       docusate sodium (COLACE) 100 MG capsule Take 1 capsule (100 mg total) by mouth 2 (two) times daily as needed for Constipation.  Qty: 60 capsule, Refills: 0    Associated Diagnoses: S/P total knee replacement using cement, left      oxyCODONE (ROXICODONE) 5 MG immediate release tablet Take 1-2 tablets by mouth every 4-6 hours as needed for pain  Qty: 50 tablet, Refills: 0    Comments: Greater than 7 day supply is medically necessary. Deliver meds to [facility] for surgery 11/19.  Associated Diagnoses: S/P total knee replacement using cement, left       !! - Potential duplicate medications found. Please discuss with provider.          I certify that this patient is confined to her home and needs intermittent skilled nursing care, physical therapy and occupational therapy.

## 2019-11-20 VITALS
TEMPERATURE: 98 F | WEIGHT: 216 LBS | SYSTOLIC BLOOD PRESSURE: 117 MMHG | HEART RATE: 88 BPM | DIASTOLIC BLOOD PRESSURE: 58 MMHG | RESPIRATION RATE: 18 BRPM | OXYGEN SATURATION: 98 % | BODY MASS INDEX: 39.75 KG/M2 | HEIGHT: 62 IN

## 2019-11-20 LAB — POCT GLUCOSE: 145 MG/DL (ref 70–110)

## 2019-11-20 PROCEDURE — 97530 THERAPEUTIC ACTIVITIES: CPT

## 2019-11-20 PROCEDURE — 63600175 PHARM REV CODE 636 W HCPCS: Performed by: NURSE PRACTITIONER

## 2019-11-20 PROCEDURE — G0008 ADMIN INFLUENZA VIRUS VAC: HCPCS | Performed by: ORTHOPAEDIC SURGERY

## 2019-11-20 PROCEDURE — 99900035 HC TECH TIME PER 15 MIN (STAT)

## 2019-11-20 PROCEDURE — 94761 N-INVAS EAR/PLS OXIMETRY MLT: CPT

## 2019-11-20 PROCEDURE — 94760 N-INVAS EAR/PLS OXIMETRY 1: CPT

## 2019-11-20 PROCEDURE — 25000003 PHARM REV CODE 250: Performed by: STUDENT IN AN ORGANIZED HEALTH CARE EDUCATION/TRAINING PROGRAM

## 2019-11-20 PROCEDURE — 90662 IIV NO PRSV INCREASED AG IM: CPT | Performed by: ORTHOPAEDIC SURGERY

## 2019-11-20 PROCEDURE — 97110 THERAPEUTIC EXERCISES: CPT

## 2019-11-20 PROCEDURE — 63600175 PHARM REV CODE 636 W HCPCS: Performed by: ORTHOPAEDIC SURGERY

## 2019-11-20 PROCEDURE — 99203 OFFICE O/P NEW LOW 30 MIN: CPT | Mod: ,,, | Performed by: ANESTHESIOLOGY

## 2019-11-20 PROCEDURE — 90471 IMMUNIZATION ADMIN: CPT | Performed by: ORTHOPAEDIC SURGERY

## 2019-11-20 PROCEDURE — 97165 OT EVAL LOW COMPLEX 30 MIN: CPT

## 2019-11-20 PROCEDURE — 97535 SELF CARE MNGMENT TRAINING: CPT

## 2019-11-20 PROCEDURE — 99203 PR OFFICE/OUTPT VISIT, NEW, LEVL III, 30-44 MIN: ICD-10-PCS | Mod: ,,, | Performed by: ANESTHESIOLOGY

## 2019-11-20 PROCEDURE — 25000003 PHARM REV CODE 250: Performed by: NURSE PRACTITIONER

## 2019-11-20 PROCEDURE — 94799 UNLISTED PULMONARY SVC/PX: CPT

## 2019-11-20 PROCEDURE — 97116 GAIT TRAINING THERAPY: CPT | Mod: 59

## 2019-11-20 RX ORDER — HYDROCHLOROTHIAZIDE 12.5 MG/1
25 TABLET ORAL DAILY
Status: DISCONTINUED | OUTPATIENT
Start: 2019-11-20 | End: 2019-11-20 | Stop reason: HOSPADM

## 2019-11-20 RX ORDER — LISINOPRIL 20 MG/1
20 TABLET ORAL DAILY
Status: DISCONTINUED | OUTPATIENT
Start: 2019-11-20 | End: 2019-11-20 | Stop reason: HOSPADM

## 2019-11-20 RX ADMIN — HYDROCHLOROTHIAZIDE 25 MG: 12.5 TABLET ORAL at 09:11

## 2019-11-20 RX ADMIN — OXYCODONE HYDROCHLORIDE 10 MG: 5 TABLET ORAL at 01:11

## 2019-11-20 RX ADMIN — SENNOSIDES,DOCUSATE SODIUM 1 TABLET: 8.6; 5 TABLET, FILM COATED ORAL at 09:11

## 2019-11-20 RX ADMIN — LISINOPRIL 20 MG: 20 TABLET ORAL at 09:11

## 2019-11-20 RX ADMIN — FAMOTIDINE 20 MG: 20 TABLET, FILM COATED ORAL at 09:11

## 2019-11-20 RX ADMIN — ACETAMINOPHEN 1000 MG: 500 TABLET ORAL at 05:11

## 2019-11-20 RX ADMIN — POLYETHYLENE GLYCOL 3350 17 G: 17 POWDER, FOR SOLUTION ORAL at 09:11

## 2019-11-20 RX ADMIN — ROPIVACAINE HYDROCHLORIDE 8 ML/HR: 2 INJECTION, SOLUTION EPIDURAL; INFILTRATION at 02:11

## 2019-11-20 RX ADMIN — APIXABAN 2.5 MG: 2.5 TABLET, FILM COATED ORAL at 09:11

## 2019-11-20 RX ADMIN — TOPIRAMATE 50 MG: 25 TABLET, FILM COATED ORAL at 09:11

## 2019-11-20 RX ADMIN — INFLUENZA A VIRUS A/MICHIGAN/45/2015 X-275 (H1N1) ANTIGEN (FORMALDEHYDE INACTIVATED), INFLUENZA A VIRUS A/SINGAPORE/INFIMH-16-0019/2016 IVR-186 (H3N2) ANTIGEN (FORMALDEHYDE INACTIVATED), AND INFLUENZA B VIRUS B/MARYLAND/15/2016 BX-69A (A B/COLORADO/6/2017-LIKE VIRUS) ANTIGEN (FORMALDEHYDE INACTIVATED) 0.5 ML: 60; 60; 60 INJECTION, SUSPENSION INTRAMUSCULAR at 01:11

## 2019-11-20 RX ADMIN — ACETAMINOPHEN 1000 MG: 500 TABLET ORAL at 01:11

## 2019-11-20 RX ADMIN — ROPIVACAINE HYDROCHLORIDE: 2 INJECTION, SOLUTION EPIDURAL; INFILTRATION at 02:11

## 2019-11-20 RX ADMIN — OXYCODONE HYDROCHLORIDE 5 MG: 5 TABLET ORAL at 09:11

## 2019-11-20 RX ADMIN — MUPIROCIN 1 G: 20 OINTMENT TOPICAL at 09:11

## 2019-11-20 RX ADMIN — AMLODIPINE BESYLATE 10 MG: 10 TABLET ORAL at 09:11

## 2019-11-20 NOTE — PLAN OF CARE
Patient tolerated PT session well. She ambulated 40ft with RW and CGA. She is okay to ambulate with nursing staff assistance and RW.     Problem: Physical Therapy Goal  Goal: Physical Therapy Goal  Description  Goals to be met by: 19    Patient will increase functional independence with mobility by performin. Sit to stand transfer with Modified [place]  2. Gait x200 feet with Modified [place] using Rolling Walker  3. Ascend/Descend 6 inch curb step with supervision using Rolling Walker  4. Lower extremity exercise program x30 reps per handout, with supervision     Outcome: Ongoing, Progressing

## 2019-11-20 NOTE — PT/OT/SLP PROGRESS
Physical Therapy Treatment and Discharge    Patient Name:  Kamla Espinoza   MRN:  [id number]    Recommendations:     Discharge Recommendations:  outpatient PT(appointment scheduled for 11/21/19)   Discharge Equipment Recommendations: bath bench   Barriers to discharge: None    Assessment:     Kamla Espinoza is a 69 y.o. female admitted with a medical diagnosis of S/P total knee replacement using cement, left.  She presents with the following impairments/functional limitations:  weakness, impaired functional mobilty, gait instability, impaired balance, decreased lower extremity function, decreased ROM, impaired joint extensibility, pain, impaired skin, orthopedic precautions.    Patient tolerated PT session well. She ambulated 60ft, 18ft, and 80ft with RW and supervision. She ascended/descended 1 curb step with RW and CGA. She performed L LE therex 2x10. She is ready to discharge home from PT standpoint.     Rehab Prognosis: Good; patient would benefit from acute skilled PT services to address these deficits and reach maximum level of function.    Recent Surgery: Procedure(s) (LRB):  ARTHROPLASTY, KNEE, TOTAL (Left) 1 Day Post-Op    Plan:     During this hospitalization, patient to be seen daily to address the identified rehab impairments via gait training, therapeutic activities, therapeutic exercises and progress toward the following goals:    · Plan of Care Expires:  11/26/19    Subjective     Chief Complaint: Pain in left knee.   Patient/Family Comments/goals: To walk without pain.   Pain/Comfort:  · Pain Rating 1: 7/10  · Location - Side 1: Left  · Location - Orientation 1: generalized  · Location 1: knee  · Pain Addressed 1: Pre-medicate for activity, Cessation of Activity, Nurse notified      Objective:     Communicated with RN prior to session.  Patient found seated in bedside chair with cryotherapy, FCD, perineural catheter upon PT entry to room.     General Precautions: Standard, fall   Orthopedic  "Precautions:LLE weight bearing as tolerated   Braces: N/A     Functional Mobility:  · Mat Mobility:     · Supine to Sit: minimal assistance for L LE management  · Sit to Supine: minimal assistance for L LE management  · Transfers:     · Sit to Stand:  stand by assistance with rolling walker x1 from bedside chair and x1 from mat with verbal cues for hand placement   · Gait: Patient ambulated 60ft, 18ft, and 80ft with Rolling Walker and supervision using 3-point gait. Patient demonstrated decreased onelia, decreased velocity of limb motion and decreased step length during gait due to pain, decreased ROM and decreased strength.  · Stairs:  Pt ascended/descended 6" curb step with Rolling Walker with Contact Guard Assistance. Verbal cues provided for technique.       AM-PAC 6 CLICK MOBILITY  Turning over in bed (including adjusting bedclothes, sheets and blankets)?: 4  Sitting down on and standing up from a chair with arms (e.g., wheelchair, bedside commode, etc.): 4  Moving from lying on back to sitting on the side of the bed?: 4  Moving to and from a bed to a chair (including a wheelchair)?: 4  Need to walk in hospital room?: 4  Climbing 3-5 steps with a railing?: 3  Basic Mobility Total Score: 23       Therapeutic Activities and Exercises:  Patient educated in and performed L LE exercises x10 reps for ankle pumps, quad set, glute set, SAQ over bolster, heel slides, hip abd/add, SLR, and LAQ.     Patient educated in:  -PT role and POC  -safety with transfers including hand placement  -gait sequencing and RW management  -OOB activity to maximize recovery including ambulating every hour to hour and a half at home   -car transfer  -stair training  -HEP for therex at home with handout       Patient left up in chair with all lines intact, call button in reach and RN notified.    GOALS:   Multidisciplinary Problems     Physical Therapy Goals     Not on file          Multidisciplinary Problems (Resolved)        Problem: " Physical Therapy Goal    Goal Priority Disciplines Outcome Goal Variances Interventions   Physical Therapy Goal   (Resolved)     PT, PT/OT Met     Description:  Goals to be met by: 19    Patient will increase functional independence with mobility by performin. Sit to stand transfer with Modified [place]  2. Gait x200 feet with Modified [place] using Rolling Walker  3. Ascend/Descend 6 inch curb step with supervision using Rolling Walker  4. Lower extremity exercise program x30 reps per handout, with supervision                       Time Tracking:     PT Received On: 19  PT Start Time: 816     PT Stop Time: 855  PT Total Time (min): 39 min     Billable Minutes: Gait Training 23 and Therapeutic Exercise 16    Treatment Type: Treatment  PT/PTA: PT       Sujey Garcia, PT  2019

## 2019-11-20 NOTE — PLAN OF CARE
Problem: Occupational Therapy Goal  Goal: Occupational Therapy Goal  Description  Goals to be met by: 11/20/19     Patient will increase functional independence with ADLs by performing:    UE Dressing with [place].  LE Dressing with Modified [place] and Assistive Devices as needed.  Grooming while standing at sink with Modified [place].  Toileting from bedside commode with Modified [place] for hygiene and clothing management.   Bathing from  shower chair/bench with Modified [place].  Toilet transfer to bedside commode with Modified [place].     Outcome: Met

## 2019-11-20 NOTE — ANESTHESIA POSTPROCEDURE EVALUATION
Anesthesia Post Evaluation    Patient: Kamla Espinoza    Procedure(s) Performed: Procedure(s) (LRB):  ARTHROPLASTY, KNEE, TOTAL (Left)    Final Anesthesia Type: CSE    Patient location during evaluation: PACU  Patient participation: Yes- Able to Participate  Level of consciousness: awake and alert  Post-procedure vital signs: reviewed and stable  Pain management: adequate  Airway patency: patent    PONV status at discharge: No PONV  Anesthetic complications: no      Cardiovascular status: blood pressure returned to baseline  Respiratory status: unassisted, spontaneous ventilation and room air  Hydration status: euvolemic  Follow-up not needed.          Vitals Value Taken Time   /58 11/20/2019  7:54 AM   Temp 36.7 °C (98 °F) 11/20/2019  7:54 AM   Pulse 88 11/20/2019  7:54 AM   Resp 18 11/20/2019  7:54 AM   SpO2 98 % 11/20/2019  7:54 AM         Event Time     Out of Recovery 17:10:00          Pain/Ramesh Score: Pain Rating Prior to Med Admin: 9 (11/20/2019  9:51 AM)  Pain Rating Post Med Admin: 4 (11/20/2019 12:20 AM)  Ramesh Score: 9 (11/19/2019  4:15 PM)

## 2019-11-20 NOTE — PROGRESS NOTES
Acute Pain Service and Perioperative Surgical Home Progress Note    HPI  Kamla Espinoza is a 69 y.o., female,     Interval history      Surgery:  Procedure(s) (LRB):  ARTHROPLASTY, KNEE, TOTAL (Left)    Post Op Day #: 1    Catheter type: Perineural Adductor Canal    Infusion type: Ropivacaine 0.2%  8 ml/hr basal    Problem List:    Active Hospital Problems    Diagnosis  POA    S/P total knee replacement using cement, left 11/19/2019 [Z96.652]  Not Applicable      Resolved Hospital Problems   No resolved problems to display.       Subjective:       General appearance of alert, oriented, no complaints   Pain with rest: 1    Numbers   Pain with movement: 1    Numbers   Side Effects    1. Pruritis No    2. Nausea No    3. Motor Blockade Yes, 0=Ability to raise lower extremities off bed    4. Sedation No, 1=awake and alert    Schedule Medications:    acetaminophen  1,000 mg Oral Q6H    amLODIPine  10 mg Oral Daily    apixaban  2.5 mg Oral BID    ceFAZolin (ANCEF) IVPB  2 g Intravenous Q8H    famotidine  20 mg Oral BID    lisinopril-hydrochlorothiazide  1 tablet Oral Daily    mupirocin  1 g Nasal BID    oxybutynin  5 mg Oral QHS    polyethylene glycol  17 g Oral Daily    pregabalin  75 mg Oral QHS    senna-docusate 8.6-50 mg  1 tablet Oral BID    topiramate  50 mg Oral BID        Continuous Infusions:   sodium chloride 0.9% Stopped (11/20/19 0450)    ropivacaine (PF) 2 mg/ml (0.2%) 8 mL/hr (11/20/19 0234)        PRN Medications:  bisacodyl, Dextrose 10% Bolus, Dextrose 10% Bolus, glucagon (human recombinant), glucose, glucose, influenza, insulin aspart U-100, morphine, morphine, morphine, naloxone, ondansetron, oxyCODONE, oxyCODONE, oxyCODONE, promethazine (PHENERGAN) IVPB, ramelteon, sodium chloride 0.9%       Antibiotics:  Antibiotics (From admission, onward)    Start     Stop Route Frequency Ordered    11/19/19 2100  mupirocin 2 % ointment 1 g      11/24 2059 Nasl 2 times daily 11/19/19 1602     11/19/19 1532  ceFAZolin injection 2 g      11/20 0559 IV Every 8 hours (non-standard times) 11/19/19 1532             Objective:     Catheter site clean, dry, intact          Vital Signs (Most Recent):  Temp: 36.7 °C (98 °F) (11/20/19 0413)  Pulse: 71 (11/20/19 0458)  Resp: 16 (11/20/19 0458)  BP: (!) 112/55 (11/20/19 0413)  SpO2: 97 % (11/20/19 0458) Vital Signs Range (Last 24H):  Temp:  [36.4 °C (97.6 °F)-36.7 °C (98.1 °F)]   Pulse:  [58-83]   Resp:  [14-19]   BP: ()/(50-66)   SpO2:  [94 %-100 %]          I & O (Last 24H):    Intake/Output Summary (Last 24 hours) at 11/20/2019 0539  Last data filed at 11/19/2019 1433  Gross per 24 hour   Intake 1000 ml   Output --   Net 1000 ml       Physical Exam:    GA: Alert, comfortable, no acute distress.   Pulmonary: Clear to auscultation A/P/L. No wheezing, crackles, or rhonchi.  Cardiac: RRR S1 & S2 w/o rubs/murmurs/gallops.   Abdominal:Bowel sounds present. No tenderness to palpation or distension. No appreciable hepatosplenomegaly.   Skin: No jaundice, rashes, or visible lesions.      Anticoagulant dose Apixaban 2.5mg BID with first dose scheduled on 11/20 at 0900.    Assessment:         Pain control adequate    Plan:     1) Pain:    Adductor canal perineural catheter in place and infusing. Good level. Multimodal pain regimen with acetaminophen, Celebrex, Lyrica, and prn oxycodone given  Will continue to monitor. Plan to discharge with On-Q on POD #1.   2) DM: Treated with home insulin.    3) HTN: Controlled with home medications; Amlodipine, Lisinopril    4) FEN/GI: Tolerating liquids, advance diet as tolerated. (+) flatus. (-) BM.   5) Dispo: Pt working well with PT/OT. Case management and SW for placement. Plan for discharge POD #1.        Evaluator Michelle Romo, NP

## 2019-11-20 NOTE — PT/OT/SLP EVAL
Physical Therapy Evaluation and Treatment     Patient Name:  Kamla Espinoza   MRN:  [id number]    Recommendations:     Discharge Recommendations:  outpatient PT   Discharge Equipment Recommendations: bath bench   Barriers to discharge: None    Assessment:     Kamla Espinoza is a 69 y.o. female admitted with a medical diagnosis of s/p L TKA.  She presents with the following impairments/functional limitations:  weakness, impaired functional mobilty, gait instability, impaired balance, decreased lower extremity function, decreased ROM, impaired joint extensibility, pain, impaired skin, orthopedic precautions.    Patient tolerated PT session well. She ambulated 40ft with RW and CGA. She is okay to ambulate with nursing staff assistance and RW.     Rehab Prognosis: Good; patient would benefit from acute skilled PT services to address these deficits and reach maximum level of function.    Recent Surgery: Procedure(s) (LRB):  ARTHROPLASTY, KNEE, TOTAL (Left) Day of Surgery    Plan:     During this hospitalization, patient to be seen daily to address the identified rehab impairments via gait training, therapeutic activities, therapeutic exercises and progress toward the following goals:    · Plan of Care Expires:  11/26/19    Subjective     Chief Complaint: Pain in left knee.   Patient/Family Comments/goals: To walk without pain.   Pain/Comfort:  · Pain Rating 1: 5/10  · Location - Side 1: Left  · Location - Orientation 1: generalized  · Location 1: knee  · Pain Addressed 1: Distraction, Cessation of Activity, Nurse notified    Living Environment:  Patient lives with her daughter and granddaughter in a [facility] with 1 step to enter. Prior to admission, patients level of function was independent for functional mobility. Equipment at home from left TAB: bedside commode, walker, rolling, cane, quad. Upon discharge, patient will have assistance from daughter and granddaughter.    Objective:     Communicated with RN prior to  session.  Patient found supine with cryotherapy, peripheral IV, FCD, perineural catheter  upon PT entry to room.    General Precautions: Standard, fall   Orthopedic Precautions:LLE weight bearing as tolerated   Braces: N/A     Exams:  · Cognitive Exam:  Patient is oriented to Person, Place, Time and Situation  · Sensation:    · -       Intact  · RLE ROM: WFL  · RLE Strength: WFL  · LLE ROM: WFL except limited at knee due to recent surgery  · LLE Strength: WFL except limited at knee due to recent surgery    Functional Mobility:  · Bed Mobility:     · Supine to Sit: stand by assistance  · Transfers:     · Sit to Stand:  contact guard assistance with rolling walker x1 from bed with verbal cues for hand placement   · Gait:  Patient ambulated 40ft with Rolling Walker and CGA using 3-point gait. Patient demonstrated decreased onelia, decreased velocity of limb motion and decreased step length during gait due to pain, decreased ROM and decreased strength.    Therapeutic Activities and Exercises:  Patient educated in:  -PT role and POC  -safety with transfers including hand placement  -gait sequencing and RW management  -OOB activity to maximize recovery including ambulating with nursing staff assistance and RW      AM-PAC 6 CLICK MOBILITY  Total Score:19     Patient left up in chair with all lines intact, call button in reach, RN notified and daughters and grandchildren present.    GOALS:   Multidisciplinary Problems     Physical Therapy Goals        Problem: Physical Therapy Goal    Goal Priority Disciplines Outcome Goal Variances Interventions   Physical Therapy Goal     PT, PT/OT Ongoing, Progressing     Description:  Goals to be met by: 19    Patient will increase functional independence with mobility by performin. Sit to stand transfer with Modified [place]  2. Gait x200 feet with Modified [place] using Rolling Walker  3. Ascend/Descend 6 inch curb step with supervision using Rolling Walker  4.  Lower extremity exercise program x30 reps per handout, with supervision                      History:     Past Medical History:   Diagnosis Date    Arthritis     Diabetes mellitus, type 2     Disorder of kidney and ureter     GERD (gastroesophageal reflux disease)     High cholesterol     Hypertension        Past Surgical History:   Procedure Laterality Date    HIP SURGERY Left 04/22/2016    [facility]R    JOINT REPLACEMENT         Time Tracking:     PT Received On: 11/19/19  PT Start Time: 1740     PT Stop Time: 1756  PT Total Time (min): 16 min     Billable Minutes: Evaluation 8 and Gait Training 8    Sujey Garcia, PT  11/19/2019

## 2019-11-20 NOTE — PT/OT/SLP EVAL
Occupational Therapy   Evaluation/Discharge Note    Name: Kamla Espinoza  MRN: [id number]  Admitting Diagnosis:  S/P total knee replacement using cement, left 1 Day Post-Op    Recommendations:     Discharge Recommendations: home  Discharge Equipment Recommendations:  bath bench  Barriers to discharge:  None    Assessment:     Kamla Espinoza is a 69 y.o. female with a medical diagnosis of S/P total knee replacement using cement, left.  She was able to perform sit/stand T/F and walk to bathroom c CGA and RW.  Able to perform UB/LB dressing c mod I and AE and grooming and toilet hygiene c mod I.  B UE are WFL.  Able to walk to bathroom c mod I and RW.  Pt is has met all OT goals. Performance deficits affecting function: impaired self care skills, impaired functional mobilty, orthopedic precautions.      Rehab Prognosis: Good; patient would benefit from acute skilled OT services to address these deficits and reach maximum level of function.       Plan:     Patient to be seen daily to address the above listed problems via self-care/home management, therapeutic activities, therapeutic exercises  · Plan of Care Expires: 11/20/19  · Plan of Care Reviewed with: patient    Subjective     Chief Complaint: Pt is s/p L TKA and is WBAT L LE  Patient/Family Comments/goals: To get better.    Occupational Profile:  Living Environment: Pt lives in a one story house c 1 [address] and has a tub/shower combo.  Previous level of function: I PTA  Equipment Used at Home:  bedside commode, walker, rolling, cane, quad, hip kit  Assistance upon Discharge: Daughter, granddaughter, and son.    Pain/Comfort:  · Pain Rating 1: 8/10    Patients cultural, spiritual, Advent conflicts given the current situation: no    Objective:     Communicated with: RN prior to session.  Patient found up in chair with cryotherapy, perineural catheter, FCD upon OT entry to room.    General Precautions: Standard, fall   Orthopedic Precautions:LLE weight bearing  as tolerated   Braces: N/A     Occupational Performance:        Functional Mobility/Transfers:  · Patient completed Sit <> Stand Transfer with stand by assistance  with  rolling walker   · Patient completed Toilet Transfer Stand Pivot technique with stand by assistance with  rolling walker  · Functional Mobility: Pt was able to walk to bathroom c SBA and RW.    Activities of Daily Living:  · Grooming: modified independence to brush teeth while sitting UIC.  · Upper Body Dressing: modified independence to don shirt.  · Lower Body Dressing: modified independence to don underwear, pants, socks, and shoes c reacher, sock-aid, and long handled shoe horn. Pt was using AE at home prior to this admission.    Cognitive/Visual Perceptual:  Cognitive/Psychosocial Skills:     -       Oriented to: Person, Place, Time and Situation   -       Follows Commands/attention:Follows multistep  commands    Physical Exam:  Upper Extremity Range of Motion:     -       Right Upper Extremity: WFL  -       Left Upper Extremity: WFL  Upper Extremity Strength:    -       Right Upper Extremity: WFL  -       Left Upper Extremity: WFL    AMPAC 6 Click ADL:  AMPAC Total Score: 24    Treatment & Education:  Educated pt on bathing and car T/F's.  Education:    Patient left up in chair with all lines intact, call button in reach and RN notified    GOALS:   Multidisciplinary Problems     Occupational Therapy Goals     Not on file          Multidisciplinary Problems (Resolved)        Problem: Occupational Therapy Goal    Goal Priority Disciplines Outcome Interventions   Occupational Therapy Goal   (Resolved)     OT, PT/OT Met    Description:  Goals to be met by: 11/20/19     Patient will increase functional independence with ADLs by performing:    UE Dressing with [place].  LE Dressing with Modified [place] and Assistive Devices as needed.  Grooming while standing at sink with Modified [place].  Toileting from bedside commode with  Modified [place] for hygiene and clothing management.   Bathing from  shower chair/bench with Modified [place].  Toilet transfer to bedside commode with Modified [place].                      History:     Past Medical History:   Diagnosis Date    Arthritis     Diabetes mellitus, type 2     Disorder of kidney and ureter     GERD (gastroesophageal reflux disease)     High cholesterol     Hypertension        Past Surgical History:   Procedure Laterality Date    HIP SURGERY Left 04/22/2016    [facility]R    JOINT REPLACEMENT      TOTAL KNEE ARTHROPLASTY Left 11/19/2019    Procedure: ARTHROPLASTY, KNEE, TOTAL;  Surgeon: Raffaele Saucedo MD;  Location: [facility];  Service: Orthopedics;  Laterality: Left;       Time Tracking:     OT Date of Treatment: 11/20/19  OT Start Time: 0856  OT Stop Time: 0935  OT Total Time (min): 39 min    Billable Minutes:Evaluation 13  Self Care/Home Management 13  Therapeutic Activity 13    ONEAL [place]  11/20/2019

## 2019-11-20 NOTE — NURSING
Eliquis being transferred from [facility] to [facility] on [address]. Patient educated extensively on not taking Aspirin with Eliquis. Patient verbalizes understanding.

## 2019-11-20 NOTE — PLAN OF CARE
Patient tolerated PT session well. She ambulated 60ft, 18ft, and 80ft with RW and supervision. She ascended/descended 1 curb step with RW and CGA. She performed L LE therex 2x10. She is ready to discharge home from PT standpoint.       Problem: Physical Therapy Goal  Goal: Physical Therapy Goal  Description  Goals to be met by: 19    Patient will increase functional independence with mobility by performin. Sit to stand transfer with Modified [place]  2. Gait x200 feet with Modified [place] using Rolling Walker  3. Ascend/Descend 6 inch curb step with supervision using Rolling Walker  4. Lower extremity exercise program x30 reps per handout, with supervision      2019 1207 by Sujey Garcia, PT  Outcome: Met  2019 1207 by Sujey Garcia, PT  Reactivated

## 2019-11-20 NOTE — NURSING
IV removed. PNC swapped to On Q at 8ml/hr. Patient educated extensively on On Q. All questions acknowledged. Discharge instructions, follow up, given to and explained to patient. All questions acknowledged. Patient discharged with family. Safety ensured.

## 2019-11-20 NOTE — PLAN OF CARE
Patient arrived to room 312 at this time. Vital signs stable. No acute distress noted. Respirations even and unlabored. Patient's surgical incision to LLE is clean, dry, and intact. PNC noted at 8 ml/hr. Neurovascular system intact. +2 pedal pulse noted to LLE. Polar ice in place. All questions and concerns addressed. Pain under control. Patient and family educated on the importance of maintaining safety precautions. Will continue to monitor the patient closely.

## 2019-11-20 NOTE — PROGRESS NOTES
[facility]  Orthopedics  Progress Note    Patient Name: Kamla Espinoza  MRN: [id number]  Admission Date: 11/19/2019  Hospital Length of Stay: 0 days  Attending Provider: Raffaele Saucedo MD  Primary Care Provider: Shannan Wilson MD  Follow-up For: Procedure(s) (LRB):  ARTHROPLASTY, KNEE, TOTAL (Left)    Post-Operative Day: 1 Day Post-Op  Subjective:     Principal Problem:S/P total knee replacement using cement, left    Principal Orthopedic Problem: s/p LL TKA    Interval History: NAEON. Pt doing well this am. Pain well controlled. 40 ft with PT yest. Denies CP/SOB/N/V.    Review of patient's allergies indicates:  No Known Allergies    Current Facility-Administered Medications   Medication    0.9%  NaCl infusion    acetaminophen tablet 1,000 mg    amLODIPine tablet 10 mg    apixaban tablet 2.5 mg    bisacodyl suppository 10 mg    dextrose 10% (D10W) Bolus    dextrose 10% (D10W) Bolus    famotidine tablet 20 mg    glucagon (human recombinant) injection 1 mg    glucose chewable tablet 16 g    glucose chewable tablet 24 g    influenza (HIGH-DOSE PF) vaccine 0.5 mL    insulin aspart U-100 pen 1-10 Units    lisinopril-hydrochlorothiazide 20-25 mg per tablet 1 tablet    morphine injection 2 mg    morphine injection 2 mg    morphine injection 2 mg    mupirocin 2 % ointment 1 g    naloxone 0.4 mg/mL injection 0.02 mg    ondansetron injection 4 mg    oxybutynin tablet 5 mg    oxyCODONE immediate release tablet 10 mg    oxyCODONE immediate release tablet 15 mg    oxyCODONE immediate release tablet 5 mg    polyethylene glycol packet 17 g    pregabalin capsule 75 mg    promethazine (PHENERGAN) 6.25 mg in dextrose 5 % 50 mL IVPB    ramelteon tablet 8 mg    ropivacaine (PF) 2 mg/ml (0.2%) infusion    senna-docusate 8.6-50 mg per tablet 1 tablet    sodium chloride 0.9% flush 10 mL    topiramate tablet 50 mg     Objective:     Vital Signs (Most Recent):  Temp: 98 °F (36.7  "°C) (11/20/19 0413)  Pulse: 71 (11/20/19 0458)  Resp: 16 (11/20/19 0458)  BP: (!) 112/55 (11/20/19 0413)  SpO2: 97 % (11/20/19 0458) Vital Signs (24h Range):  Temp:  [97.6 °F (36.4 °C)-98.1 °F (36.7 °C)] 98 °F (36.7 °C)  Pulse:  [58-83] 71  Resp:  [14-19] 16  SpO2:  [94 %-100 %] 97 %  BP: ()/(50-66) 112/55     Weight: 98 kg (216 lb)  Height: 5' 1.5" (156.2 cm)  Body mass index is 40.15 kg/m².      Intake/Output Summary (Last 24 hours) at 11/20/2019 0638  Last data filed at 11/19/2019 1433  Gross per 24 hour   Intake 1000 ml   Output --   Net 1000 ml       Ortho/SPM Exam   AAOx4  NAD  Reg rate  No increased WOB  Dressing c/d/i  Polar ice in place  SILT T/SP/DP/Frey/Sa  Motor intact T/SP/DP  WWP extremities  FCDs in place and functioning      Significant Labs: None    Significant Imaging: I have reviewed and interpreted all pertinent imaging results/findings.    Assessment/Plan:     * S/P total knee replacement using cement, left 11/19/2019  69 y.o. female POD1 s/p L TKA    Pain control: multimodal per surgical home  PT/OT: WBAT LLE  DVT PPx: eliquis 2.5mg BID, FCDs at all times when not ambulating  Abx: postop Ancef  Bulky dressing d/c this am  Keep aquacel c/d/i    Dispo: f/u PT recs          Yane Valentin MD  Orthopedics  [facility]  "

## 2019-11-20 NOTE — ASSESSMENT & PLAN NOTE
69 y.o. female POD1 s/p L TKA    Pain control: multimodal per surgical home  PT/OT: WBAT LLE  DVT PPx: eliquis 2.5mg BID, FCDs at all times when not ambulating  Abx: postop Ancef  Bulky dressing d/c this am  Keep aquacel c/d/i    Dispo: f/u PT recs

## 2019-11-21 NOTE — HPI
CC: Left knee pain     Kamla Espinoza is a 69 y.o. female with a history of Left knee pain. Pain is worse with activity and weight bearing.  Patient has experienced interference of activities of daily living due to decreased range of motion and an increase in joint pain and swelling.  Patient has failed non-operative treatment including NSAIDs, corticosteroid injections, viscosupplement injections, and activity modification.  Kamla Espinoza currently ambulates with a cane.      Relevant medical conditions of significance in perioperative period:  Type 2 DM- on medication managed by pcp  HTN- on medication managed by pcp  CKD- managed by pcp

## 2019-11-21 NOTE — DISCHARGE SUMMARY
[facility]  Orthopedics  Discharge Summary      Patient Name: Kamla Espinoza  MRN: [id number]  Admission Date: 11/19/2019  Hospital Length of Stay: 0 days  Discharge Date and Time: 11/20/2019  2:40 PM  Attending Physician: No att. providers found   Discharging Provider: Yane Valentin MD  Primary Care Provider: Shannan Wilson MD    HPI:   CC: Left knee pain     Kamla Espinoza is a 69 y.o. female with a history of Left knee pain. Pain is worse with activity and weight bearing.  Patient has experienced interference of activities of daily living due to decreased range of motion and an increase in joint pain and swelling.  Patient has failed non-operative treatment including NSAIDs, corticosteroid injections, viscosupplement injections, and activity modification.  Kamla Espinoza currently ambulates with a cane.      Relevant medical conditions of significance in perioperative period:  Type 2 DM- on medication managed by pcp  HTN- on medication managed by pcp  CKD- managed by pcp    Procedure(s) (LRB):  ARTHROPLASTY, KNEE, TOTAL (Left)      Hospital Course:  Patient presented for above procedure.  Tolerated it well and was discharged home POD1 after voiding, tolerating diet, ambulating, pain controlled.  Discharge instructions, follow-up appointment, and med rec are below.          Significant Diagnostic Studies: No pertinent studies.    Pending Diagnostic Studies:     None        Final Active Diagnoses:    Diagnosis Date Noted POA    PRINCIPAL PROBLEM:  S/P total knee replacement using cement, left 11/19/2019 [Z96.652] 11/16/2019 Not Applicable      Problems Resolved During this Admission:      Discharged Condition: stable    Disposition: Home or Self Care    Follow Up:    Patient Instructions:      Activity as tolerated     Call MD for:  difficulty breathing, headache or visual disturbances     Call MD for:  extreme fatigue     Call MD for:  hives     Call MD for:  persistent  dizziness or light-headedness     Call MD for:  persistent nausea and vomiting     Call MD for:  redness, tenderness, or signs of infection (pain, swelling, redness, odor or green/yellow discharge around incision site)     Call MD for:  severe uncontrolled pain     Call MD for:  temperature >100.4     Keep surgical extremity elevated     Leave dressing on - Keep it clean, dry, and intact until clinic visit   Order Comments: Do not remove surgical dressing for 2 weeks post-op. This will be done only by MD at initial post-op visit. If dressing is completely saturated, replace with identical dressing - silver-impregnated hydrocolloid dressing.     Do not get dressings wet. Do not shower.     If dressing continues to be saturated or there are signs of infection, please call [facility] [phone] for further instructions and to make appt to be seen.     Lifting restrictions   Order Comments: No strenuous exercise or lifting of > 10 lbs     No driving, operating heavy equipment or signing legal documents while taking pain medication     Sponge bath only until clinic visit     Weight bearing as tolerated     Medications:  Reconciled Home Medications:      Medication List      START taking these medications    apixaban 2.5 mg Tab  Commonly known as:  ELIQUIS  Take 1 tablet (2.5 mg total) by mouth 2 (two) times daily. for 14 days        CONTINUE taking these medications    Accu-Chek Lily Control Soln Soln  Generic drug:  blood glucose control high,low     Accu-Chek Lily Plus test strp Strp  Generic drug:  blood sugar diagnostic     Accu-Chek Softclix Lancets Misc  Generic drug:  lancets     acetaminophen 650 MG Tbsr  Commonly known as:  TYLENOL  Take 1 tablet (650 mg total) by mouth every 8 (eight) hours as needed.     amLODIPine 5 MG tablet  Commonly known as:  NORVASC  Take 10 mg by mouth once daily.     BD Alcohol Swabs Padm  Generic drug:  alcohol swabs     calcium carbonate 300 mg (750 mg) Chew  Commonly known as:   TUMS  Take by mouth. tums for acid reflux     diclofenac sodium 1 % Gel  Commonly known as:  VOLTAREN  Apply 2 g topically as needed.     fluticasone propionate 50 mcg/actuation nasal spray  Commonly known as:  FLONASE  1 spray by Nasal route.     ibuprofen 800 MG tablet  Commonly known as:  ADVIL,MOTRIN  Take 800 mg by mouth daily as needed for Pain.     lisinopril-hydrochlorothiazide 20-25 mg Tab  Commonly known as:  PRINZIDE,ZESTORETIC  Take 1 tablet by mouth once daily.     metFORMIN 500 MG tablet  Commonly known as:  GLUCOPHAGE  Take 500 mg by mouth 2 (two) times daily with meals.     OXYBUTYNIN CHLORIDE ORAL  Take by mouth every evening.     oxyCODONE 5 MG immediate release tablet  Commonly known as:  ROXICODONE  Take 1-2 tablets by mouth every 4-6 hours as needed for pain     Stool Softener 100 MG capsule  Generic drug:  docusate sodium  Take 1 capsule (100 mg total) by mouth 2 (two) times daily as needed for Constipation.     topiramate 50 MG tablet  Commonly known as:  TOPAMAX  Take 1 tablet (50 mg total) by mouth 2 (two) times daily.     traMADol 50 mg tablet  Commonly known as:  ULTRAM  Take 50 mg by mouth every 6 (six) hours as needed for Pain.        STOP taking these medications    aspirin 81 MG EC tablet  Commonly known as:  ECOTRIN            Yane Valentin MD  Orthopedics  [facility]

## 2019-11-21 NOTE — HOSPITAL COURSE
Patient presented for above procedure.  Tolerated it well and was discharged home POD1 after voiding, tolerating diet, ambulating, pain controlled.  Discharge instructions, follow-up appointment, and med rec are below.

## 2019-11-22 ENCOUNTER — CLINICAL SUPPORT (OUTPATIENT)
Dept: REHABILITATION | Facility: HOSPITAL | Age: 69
End: 2019-11-22
Payer: MEDICARE

## 2019-11-22 DIAGNOSIS — R29.898 WEAKNESS OF BOTH LOWER EXTREMITIES: ICD-10-CM

## 2019-11-22 DIAGNOSIS — M25.661 DECREASED RANGE OF MOTION OF BOTH KNEES: ICD-10-CM

## 2019-11-22 DIAGNOSIS — M25.662 DECREASED RANGE OF MOTION OF BOTH KNEES: ICD-10-CM

## 2019-11-22 PROBLEM — R26.2 DIFFICULTY WALKING: Status: RESOLVED | Noted: 2019-08-28 | Resolved: 2019-11-22

## 2019-11-22 PROBLEM — G89.29 CHRONIC PAIN OF BOTH KNEES: Status: RESOLVED | Noted: 2019-08-28 | Resolved: 2019-11-22

## 2019-11-22 PROBLEM — M25.562 CHRONIC PAIN OF BOTH KNEES: Status: RESOLVED | Noted: 2019-08-28 | Resolved: 2019-11-22

## 2019-11-22 PROBLEM — M25.561 CHRONIC PAIN OF BOTH KNEES: Status: RESOLVED | Noted: 2019-08-28 | Resolved: 2019-11-22

## 2019-11-22 PROCEDURE — 97162 PT EVAL MOD COMPLEX 30 MIN: CPT | Mod: PO

## 2019-11-22 PROCEDURE — G8978 MOBILITY CURRENT STATUS: HCPCS | Mod: CN,PO

## 2019-11-22 PROCEDURE — G8979 MOBILITY GOAL STATUS: HCPCS | Mod: CL,PO

## 2019-11-22 NOTE — PLAN OF CARE
[facility]  Physical Therapy Initial Evaluation    Name: Kamla Espinoza  Clinic Number: [id number]    Therapy Diagnosis:   Encounter Diagnoses   Name Primary?    Decreased range of motion of both knees     Weakness of both lower extremities      Physician: Raffaele Saucedo MD    Physician Orders: PT Eval and Treat   Medical Diagnosis from Referral: M17.12 (ICD-10-CM) - Primary osteoarthritis of left knee  Evaluation Date: 11/22/2019  Authorization Period Expiration: 12/31/19  Plan of Care Expiration: 2/22/20 or 12 visits  Visit # / Visits authorized: 1/ TBD  (11 total visits for the year)    Gcode: 1/10  Visit:  82.88  Total: ~892.92    Time In: 10:30 (pt arrived late)  Time Out: 11:10  Total Billable Time: 40 minutes     Precautions: Standard and Diabetes    Subjective   Date of Surgery: 11/19/19 L TKA    History of current condition - Kamla reports: that her symptoms have not been as bad as she thought since surgery.  Pt has been icing her knee throughout the day.  She has been trying to walk while at home and perform HEP.  Pt reports that she has been using RW at home.  Pt returns to MD on 12/3/19.  Pt reports that she does not always have a ride and she will have trouble getting to therapy.     Medical History:   Past Medical History:   Diagnosis Date    Arthritis     Diabetes mellitus, type 2     Disorder of kidney and ureter     GERD (gastroesophageal reflux disease)     High cholesterol     Hypertension        Surgical History:   Kamla Espinoza  has a past surgical history that includes Hip surgery (Left, 04/22/2016); Joint replacement; and Total knee arthroplasty (Left, 11/19/2019).    Medications:   Kamla has a current medication list which includes the following prescription(s): accu-chek jose control soln, accu-chek jose plus test strp, accu-chek softclix lancets, acetaminophen, amlodipine, apixaban, bd alcohol swabs, calcium carbonate, diclofenac  "sodium, docusate sodium, fluticasone propionate, ibuprofen, lisinopril-hydrochlorothiazide, metformin, oxybutynin chloride, oxycodone, topiramate, and tramadol.    Allergies:   Review of patient's allergies indicates:  No Known Allergies     Imaging: See EMR for imaging    Prior Therapy: Yes  Social History: No stairs in home but does has 1 step to enter. lives with their family    Prior Level of Function: [facility] for ambulation  Current Level of Function: RW    Pain:  Current 6/10, worst 10/10, best 6/10   Location: left knee   Easing Factors: pain medication, ice, rest and elevation    Pts goals: return to normal walking    Objective     Palpation: Mild TTP along superior knee.  Warmth noted on palpation    Sensation: intact to light touch    Range of Motion/Strength:     Knee Left   AROM/PROM    flexion  70/75   extension  20/15       L/E MMT Right Left   Hip Flexion 4-/5 3+/5   Hip Extension 4-/5 3+/5   Hip Abduction 4-/5 3+/5   Hip Adduction 4-/5 3+/5   Hip IR 4-/5 3+/5   Hip ER 4-/5 3+/5   Knee Flexion 4-/5 3+/5   Knee Extension 4-/5 3+/5   Ankle DF 4-/5 4-/5   Ankle PF 4-/5 4-/5     Gait Analysis:With AD.  Device Used -  Pick-up walker  Deviations: Decreased step length, moderate knee flexion throughout gait cycle.  B trendelenburg and decreased onelia.    TU minutes and 56 seconds   TUG Cutoff Scores:        30 second sit-to-stand test (without U/E support): 0 (1 w/ UE support)  30" sit to stand Cutoff Scores:      CMS Impairment/Limitation/Restriction for LEFS Survey    Therapist reviewed LEFS scores for Kamla Espinoza on 2019.   LEFS documents entered into [facility] - see Media section.    Limitation Score: 100%  Category: Mobility    Current : CN = 100% impaired, limited or restricted  Goal: CL = least 60% but < 80% impaired, limited or restricted         TREATMENT     PT educated pt to continue c/ previous HEP from hospital.        Home Exercises Provided and Patient Education Provided "     Education provided:   - Importance of HEP and role of PT  - Benefits of home health PT due to lack of transportation      Assessment   Kamla is a 69 y.o. female referred to outpatient Physical Therapy with a medical diagnosis of s/p L TKA. Pt presents with decreased R knee ROM as well as limitations in BLE strength.  PT did not add any new therex to HEP due to patient arriving 30 minutes late to session.  PT educated pt on benefit of home health PT due to lack of transportation.  Pt states that she will contact MD regarding home health.    Pt prognosis is Fair.   Pt will benefit from skilled outpatient Physical Therapy to address the deficits stated above and in the chart below, provide pt/family education, and to maximize pt's level of independence.     Plan of care discussed with patient: Yes  Pt's spiritual, cultural and educational needs considered and patient is agreeable to the plan of care and goals as stated below:     Anticipated Barriers for therapy: transportation    Medical Necessity is demonstrated by the following  History  Co-morbidities and personal factors that may impact the plan of care Co-morbidities:   diabetes, high BMI and HTN    Personal Factors:   lifestyle     Moderate   Examination  Body Structures and Functions, activity limitations and participation restrictions that may impact the plan of care Body Regions:   lower extremities    Body Systems:    ROM  strength  gait  transfers  motor control  motor learning    Participation Restrictions:   exercise    Activity limitations:   Mobility  walking    Self care  dressing  looking after one's health    Domestic Life  doing house work (cleaning house, washing dishes, laundry)  assisting others             moderate   Clinical Presentation evolving clinical presentation with changing clinical characteristics moderate   Decision Making/ Complexity Score: moderate       Goals:  Short Term Goals: 4 visits   1. Pt will be independent c/  HEP.    Long Term Goals: 12 visits   1. Pt will improve LEFS to 75% limitation.  2. Pt will increase BLE to at least 4+/5 to improve functional mobility.  3.Pt will increase L knee ROM to 0-110 to improve functional mobility.  4. Pt will be able to ambulate 150 feet c/ LRAD to improve independence c/ functional mobility  5. Pt will decrease TUG time to less than or equal to 40 seconds.    Plan   Plan of care Certification: 11/22/2019 to 2/22/20 or 12 visits.    Outpatient Physical Therapy 3 times weekly for 6 weeks to include the following interventions: Gait Training, Manual Therapy, Moist Heat/ Ice, Neuromuscular Re-ed, Patient Education, Self Care, Therapeutic Activites and Therapeutic Exercise, ASTYM, Kinesiotaping PRN, Functional Dry Needling    Drew Dubose, PT, DPT

## 2019-11-22 NOTE — ADDENDUM NOTE
Addendum  created 11/22/19 1150 by Derick Lantigua RN    Intraprocedure Event edited, Sign clinical note

## 2019-11-22 NOTE — PROGRESS NOTES
11/22/2019 1148    Called and spoke with patient, reported patient's On-Q pump to be removed this as medication has been completed. Re-instructed to check for blue tip to end of catheter upon removal. Verbalizes understanding.  Denies any other concerns at this time. Encouraged to call with any further questions/concerns.

## 2019-11-22 NOTE — PROGRESS NOTES
[facility]  Physical Therapy Initial Evaluation    Name: Kamla Espinoza  Clinic Number: [id number]    Therapy Diagnosis:   Encounter Diagnoses   Name Primary?    Decreased range of motion of both knees     Weakness of both lower extremities      Physician: Raffaele Saucedo MD    Physician Orders: PT Eval and Treat   Medical Diagnosis from Referral: M17.12 (ICD-10-CM) - Primary osteoarthritis of left knee  Evaluation Date: 11/22/2019  Authorization Period Expiration: 12/31/19  Plan of Care Expiration: 2/22/20 or 12 visits  Visit # / Visits authorized: 1/ TBD  (11 total visits for the year)    Gcode: 1/10  Visit:  82.88  Total: ~892.92    Time In: 10:30 (pt arrived late)  Time Out: 11:10  Total Billable Time: 40 minutes     Precautions: Standard and Diabetes    Subjective   Date of Surgery: 11/19/19 L TKA    History of current condition - Kamla reports: that her symptoms have not been as bad as she thought since surgery.  Pt has been icing her knee throughout the day.  She has been trying to walk while at home and perform HEP.  Pt reports that she has been using RW at home.  Pt returns to MD on 12/3/19.  Pt reports that she does not always have a ride and she will have trouble getting to therapy.     Medical History:   Past Medical History:   Diagnosis Date    Arthritis     Diabetes mellitus, type 2     Disorder of kidney and ureter     GERD (gastroesophageal reflux disease)     High cholesterol     Hypertension        Surgical History:   Kamla Espinoza  has a past surgical history that includes Hip surgery (Left, 04/22/2016); Joint replacement; and Total knee arthroplasty (Left, 11/19/2019).    Medications:   Kamla has a current medication list which includes the following prescription(s): accu-chek jose control soln, accu-chek jose plus test strp, accu-chek softclix lancets, acetaminophen, amlodipine, apixaban, bd alcohol swabs, calcium carbonate, diclofenac  "sodium, docusate sodium, fluticasone propionate, ibuprofen, lisinopril-hydrochlorothiazide, metformin, oxybutynin chloride, oxycodone, topiramate, and tramadol.    Allergies:   Review of patient's allergies indicates:  No Known Allergies     Imaging: See EMR for imaging    Prior Therapy: Yes  Social History: No stairs in home but does has 1 step to enter. lives with their family    Prior Level of Function: [facility] for ambulation  Current Level of Function: RW    Pain:  Current 6/10, worst 10/10, best 6/10   Location: left knee   Easing Factors: pain medication, ice, rest and elevation    Pts goals: return to normal walking    Objective     Palpation: Mild TTP along superior knee.  Warmth noted on palpation    Sensation: intact to light touch    Range of Motion/Strength:     Knee Left   AROM/PROM    flexion  70/75   extension  20/15       L/E MMT Right Left   Hip Flexion 4-/5 3+/5   Hip Extension 4-/5 3+/5   Hip Abduction 4-/5 3+/5   Hip Adduction 4-/5 3+/5   Hip IR 4-/5 3+/5   Hip ER 4-/5 3+/5   Knee Flexion 4-/5 3+/5   Knee Extension 4-/5 3+/5   Ankle DF 4-/5 4-/5   Ankle PF 4-/5 4-/5     Gait Analysis:With AD.  Device Used -  Pick-up walker  Deviations: Decreased step length, moderate knee flexion throughout gait cycle.  B trendelenburg and decreased onelia.    TU minutes and 56 seconds   TUG Cutoff Scores:        30 second sit-to-stand test (without U/E support): 0 (1 w/ UE support)  30" sit to stand Cutoff Scores:      CMS Impairment/Limitation/Restriction for LEFS Survey    Therapist reviewed LEFS scores for Kamla Espinoza on 2019.   LEFS documents entered into [employer] - see Media section.    Limitation Score: 100%  Category: Mobility    Current : CN = 100% impaired, limited or restricted  Goal: CL = least 60% but < 80% impaired, limited or restricted         TREATMENT     PT educated pt to continue c/ previous HEP from hospital.        Home Exercises Provided and Patient Education Provided "     Education provided:   - Importance of HEP and role of PT  - Benefits of home health PT due to lack of transportation      Assessment   Kamla is a 69 y.o. female referred to outpatient Physical Therapy with a medical diagnosis of s/p L TKA. Pt presents with decreased R knee ROM as well as limitations in BLE strength.  PT did not add any new therex to HEP due to patient arriving 30 minutes late to session.  PT educated pt on benefit of home health PT due to lack of transportation.  Pt states that she will contact MD regarding home health.    Pt prognosis is Fair.   Pt will benefit from skilled outpatient Physical Therapy to address the deficits stated above and in the chart below, provide pt/family education, and to maximize pt's level of independence.     Plan of care discussed with patient: Yes  Pt's spiritual, cultural and educational needs considered and patient is agreeable to the plan of care and goals as stated below:     Anticipated Barriers for therapy: transportation    Medical Necessity is demonstrated by the following  History  Co-morbidities and personal factors that may impact the plan of care Co-morbidities:   diabetes, high BMI and HTN    Personal Factors:   lifestyle     Moderate   Examination  Body Structures and Functions, activity limitations and participation restrictions that may impact the plan of care Body Regions:   lower extremities    Body Systems:    ROM  strength  gait  transfers  motor control  motor learning    Participation Restrictions:   exercise    Activity limitations:   Mobility  walking    Self care  dressing  looking after one's health    Domestic Life  doing house work (cleaning house, washing dishes, laundry)  assisting others             moderate   Clinical Presentation evolving clinical presentation with changing clinical characteristics moderate   Decision Making/ Complexity Score: moderate       Goals:  Short Term Goals: 4 visits   1. Pt will be independent c/  HEP.    Long Term Goals: 12 visits   1. Pt will improve LEFS to 75% limitation.  2. Pt will increase BLE to at least 4+/5 to improve functional mobility.  3.Pt will increase L knee ROM to 0-110 to improve functional mobility.  4. Pt will be able to ambulate 150 feet c/ LRAD to improve independence c/ functional mobility  5. Pt will decrease TUG time to less than or equal to 40 seconds.    Plan   Plan of care Certification: 11/22/2019 to 2/22/20 or 12 visits.    Outpatient Physical Therapy 3 times weekly for 6 weeks to include the following interventions: Gait Training, Manual Therapy, Moist Heat/ Ice, Neuromuscular Re-ed, Patient Education, Self Care, Therapeutic Activites and Therapeutic Exercise, ASTYM, Kinesiotaping PRN, Functional Dry Needling    Drew Dubose, PT, DPT

## 2019-11-25 ENCOUNTER — CLINICAL SUPPORT (OUTPATIENT)
Dept: REHABILITATION | Facility: HOSPITAL | Age: 69
End: 2019-11-25
Payer: MEDICARE

## 2019-11-25 DIAGNOSIS — M25.662 DECREASED RANGE OF MOTION OF BOTH KNEES: ICD-10-CM

## 2019-11-25 DIAGNOSIS — R29.898 WEAKNESS OF BOTH LOWER EXTREMITIES: ICD-10-CM

## 2019-11-25 DIAGNOSIS — M25.661 DECREASED RANGE OF MOTION OF BOTH KNEES: ICD-10-CM

## 2019-11-25 PROCEDURE — 97116 GAIT TRAINING THERAPY: CPT | Mod: 59,PO

## 2019-11-25 PROCEDURE — 97110 THERAPEUTIC EXERCISES: CPT | Mod: PO

## 2019-11-25 PROCEDURE — 97530 THERAPEUTIC ACTIVITIES: CPT | Mod: 59,PO

## 2019-11-25 PROCEDURE — 97140 MANUAL THERAPY 1/> REGIONS: CPT | Mod: PO

## 2019-11-25 NOTE — PROGRESS NOTES
[facility]    Physical Therapy Daily Treatment Note     Name: Kamla Espinoza  Clinic Number: [id number]    Therapy Diagnosis:   Encounter Diagnoses   Name Primary?    Decreased range of motion of both knees     Weakness of both lower extremities      Physician: Raffaele Saucedo MD    Physician Orders: PT Eval and Treat   Medical Diagnosis from Referral: M17.12 (ICD-10-CM) - Primary osteoarthritis of left knee  Treatment Date: 11/25/2019  Authorization Period Expiration: 12/31/19  Plan of Care Expiration: 2/22/20 or 12 visits  Visit # / Visits authorized: 2/ 11        Time In: 10:55  Time Out: 11:55  Total Billable Time: 60minutes     Precautions: Standard and Diabetes    Subjective   Pt reports having a fall on Saturday when attempting to transfer from bed to commode onto buttock.  EMS called for assistance; pt decline ED visit despite EMS encouragement.  Pt reports no change in pain/ not any further concerns since fall.  Pt received in waiting room today w/o need for assistance transferring from car; caregiver only required moderate assistance for LE movement.      Date of Surgery: 11/19/19 L TKA    History of current condition - Kamla reports: that her symptoms have not been as bad as she thought since surgery.  Pt has been icing her knee throughout the day.  She has been trying to walk while at home and perform HEP.  Pt reports that she has been using RW at home.  Pt returns to MD on 12/3/19.  Pt reports that she does not always have a ride and she will have trouble getting to therapy.       Prior Therapy: Yes  Social History: No stairs in home but does has 1 step to enter. lives with their family    Prior Level of Function: SPC for ambulation  Current Level of Function: RW    Pain:  Current 7/10, worst 9/10, best 2/10   Location: left knee   Easing Factors: pain medication, ice, rest and elevation    Pts goals: return to normal walking    Objective     Palpation: Mild TTP  along superior knee.  Warmth noted on palpation      Range of Motion/Strength:     Knee Left   AROM/PROM    flexion  70/75   extension  20/15     Bike circumduction performed 20-70deg 10min  // 75-15deg    L/E MMT Right Left   Hip Flexion 4-/5 3+/5   Hip Extension 4-/5 3+/5   Hip Abduction 4-/5 3+/5   Hip Adduction 4-/5 3+/5   Hip IR 4-/5 3+/5   Hip ER 4-/5 3+/5   Knee Flexion 4-/5 3+/5   Knee Extension 4-/5 3+/5   Ankle DF 4-/5 4-/5   Ankle PF 4-/5 4-/5     Gait Analysis:With AD.  Device Used -  Pick-up walker  Deviations: Decreased step length, moderate knee flexion throughout gait cycle.  B trendelenburg and decreased onelia.    TU minutes and 56 seconds       Kamla received therapeutic exercises to develop strength, endurance, ROM and core stabilization for 31 minutes including:    Bike circumduction performed 20-70deg 10min  // 75-15deg  Quad Sets 5sec hold 3x10; LE ER log roll required to ensure LE neutral alignment w/o in-toeing  LE hooklyn ABD [place] TB 3x10  (B) Ankle pumps [id number]    Kamla received the following manual therapy techniques: Joint mobilizations and Soft tissue Mobilization were applied to the:(L) Knee for 11 minutes, including:  GI oscillation for TKE +AP 7x4      Kamla participated in dynamic functional therapeutic activities to improve functional performance for 9  minutes, including: STS training w/ gluteal EXT to prevent stooped posture and post LOB    Kamla participated in gait training to improve functional mobility and safety for 10 minutes, including: AMB FWW from waiting room requiring close SUP/CGA and transfer training from bike to treatment table [place] x1 with CGA x1 to ensure safety      Home Exercises Provided and Patient Education Provided     Education provided: need for TKE w/ neutral LE w/o valgus/IR tendencies; ICE +barrier & elevation at home    Written Home Exercises Provided: yes.  Exercises were reviewed and Kamla was able to demonstrate them prior to the  end of the session.  Kamla demonstrated good  understanding of the education provided.     See EMR under Media for exercises provided 11/25/2019.        Assessment   Kamla is a 69 y.o. female referred to outpatient Physical Therapy with a medical diagnosis of s/p L TKA. Pt presents with decreased R knee ROM as well as limitations in BLE strength.  PT did not add any new therex to HEP due to patient arriving 30 minutes late to session.  PT educated pt on benefit of home health PT due to lack of transportation.  Pt states that she will contact MD regarding home health.    Pt prognosis is Fair.   Pt will benefit from skilled outpatient Physical Therapy to address the deficits stated above and in the chart below, provide pt/family education, and to maximize pt's level of independence.     Plan of care discussed with patient: Yes  Pt's spiritual, cultural and educational needs considered and patient is agreeable to the plan of care and goals as stated below:     Anticipated Barriers for therapy: transportation      Goals:  Short Term Goals: 4 visits   1. Pt will be independent c/ HEP.    Long Term Goals: 12 visits   1. Pt will improve LEFS to 75% limitation.  2. Pt will increase BLE to at least 4+/5 to improve functional mobility.  3.Pt will increase L knee ROM to 0-110 to improve functional mobility.  4. Pt will be able to ambulate 150 feet c/ LRAD to improve independence c/ functional mobility  5. Pt will decrease TUG time to less than or equal to 40 seconds.    Plan     Progress AMB tolerance as to be able to AMB to car at end of treatment session.    Plan of care Certification: 11/25/2019 to 2/22/20 or 12 visits.    Outpatient Physical Therapy 3 times weekly for 6 weeks to include the following interventions: Gait Training, Manual Therapy, Moist Heat/ Ice, Neuromuscular Re-ed, Patient Education, Self Care, Therapeutic Activites and Therapeutic Exercise, ASTYM, Kinesiotaping PRN, Functional Dry  Nichelle Tapia, PT, DPT

## 2019-11-26 NOTE — PROGRESS NOTES
"[facility]    Physical Therapy Daily Treatment Note     Name: Kamla Espinoza  Clinic Number: [id number]    Therapy Diagnosis:   Encounter Diagnoses   Name Primary?    Decreased range of motion of both knees     Weakness of both lower extremities     Abnormality of gait      Physician: Raffaele Saucedo MD    Physician Orders: PT Eval and Treat   Medical Diagnosis from Referral: M17.12 (ICD-10-CM) - Primary osteoarthritis of left knee  Surgical History:  Left TKA 11/19/19  Treatment Date: 11/27/2019  Authorization Period Expiration: 12/31/19  Plan of Care Expiration: 2/22/20 or 12 visits  Visit # / Visits authorized: 3 /11          Time In: 1125  Time Out: 1234  Total Billable Time: 69 minutes     Precautions: Standard and Diabetes    Subjective     Patient reports pain level of 5/10 today.  Best 2/10  Worst 9/10 - awakens her at night. Takes pain medication which helps.  Patient is doing her exercises at home moderately well.  Using RW for ambulation about the home    History of current condition - Kamla reports: that her symptoms have not been as bad as she thought since surgery.  Pt has been icing her knee throughout the day.  She has been trying to walk while at home and perform HEP.  Pt reports that she has been using RW at home.  Pt returns to [place] on 12/3/19.  Pt reports that she does not always have a ride and she will have trouble getting to therapy.       Prior Therapy: Yes  Social History: No stairs in home but does has 1 step to enter. lives with their family    Prior Level of Function: SPC for ambulation  Current Level of Function: RW    Pain:  Current 5/10, worst 9/10, best 2/10   Location: left knee   Easing Factors: pain medication, ice, rest and elevation   Pain post treatment 8/10 - "back of knee"    Pts goals: return to normal walking    Objective     Palpation: Mild TTP along superior knee.  Warmth noted on palpation      Range of Motion/Strength:     Knee " Left   AROM/PROM    flexion  80/ 80   extension  30/ 0     Bike circumduction performed 20-70deg 10min  // 75-15deg    L/E MMT Right Left   Hip Flexion 4-/5 3+/5   Hip Extension 4-/5 3+/5   Hip Abduction 4-/5 3+/5   Hip Adduction 4-/5 3+/5   Hip IR 4-/5 3+/5   Hip ER 4-/5 3+/5   Knee Flexion 4-/5 3+/5   Knee Extension 4-/5 3+/5   Ankle DF 4-/5 4-/5   Ankle PF 4-/5 4-/5     Gait Analysis:With AD.  Device Used -  Pick-up walker  Deviations: Decreased step length, moderate knee flexion throughout gait cycle.  B trendelenburg and decreased onelia. Forward bent trunk posture.      TU minutes and 56 seconds       Kamla received therapeutic exercises to develop strength, endurance, ROM and core stabilization for 45 minutes including:    Bike circumduction performed 20-70deg 10min  // 75-15deg - not performed today  Quad Sets 5sec hold 3x10; LE ER log roll required to ensure LE neutral alignment w/o in-toeing  LE hooklyng ABD [place] TB 3x10  (B) Ankle pumps [id number]  Manual resistance - submaximal to all planes of motion to ankle - 10 reps  Heel slides supine through full available range of motion , 2 x 10 reps, verbal cuing for neutral LE alignment w/o in-toeing  Supine TKE with 1/2 roll under knee - good quad activation noted    Kamla received the following manual therapy techniques: Joint mobilizations and Soft tissue Mobilization were applied to the:(L) Knee for 0 minutes, including:  GI oscillation for TKE +AP 7x4  Not performed today      Kamla participated in dynamic functional therapeutic activities to improve functional performance for 8  minutes, including: STS training w/ gluteal EXT to prevent stooped posture and post LOB.  3 x 1 min with verbal cuing, SBA of PT    Kamla participated in gait training to improve functional mobility and safety for 15 minutes, including: AMB with standard walker from clinic to end of ortiz and return.  Periodic verbal cuing for heel strike, LE alignment and gait  sequencing requiring close SUP/CGA and transfer training from bike to treatment table [place] x1 with CGA x1 to ensure safety      Home Exercises Provided and Patient Education Provided     Education provided: need for TKE w/ neutral LE w/o valgus/IR tendencies; ICE +barrier & elevation at home    Written Home Exercises Provided: yes.  Exercises were reviewed and Kamla was able to demonstrate them prior to the end of the session.  Kamla demonstrated good  understanding of the education provided.     See EMR under Media for exercises provided 11/25/2019.        Assessment   Kamla is a 69 y.o. female referred to outpatient Physical Therapy with a medical diagnosis of s/p L TKA. Pt presents with decreased R knee ROM as well as limitations in BLE strength.  PT did not add any new therex to HEP due to patient arriving 30 minutes late to session.  PT educated pt on benefit of home health PT due to lack of transportation.  Pt states that she will contact MD regarding home health.    Kamla did moderately well with today's treatment session.  Slow pace required to avoid increase in pain in the left knee.  Patient transfers with SBA of PT.  Patient demonstrates improved knee extension and flexion from prior visit.  Requires assist to lift leg onto mat for transfer from sit to supine and reverse.  Patient should continue to make good progress.  She requires skilled PT to reach her goals of functional ambulation, increase strength and range of motion.    Pt prognosis is Fair.   Pt will benefit from skilled outpatient Physical Therapy to address the deficits stated above and in the chart below, provide pt/family education, and to maximize pt's level of independence.     Plan of care discussed with patient: Yes  Pt's spiritual, cultural and educational needs considered and patient is agreeable to the plan of care and goals as stated below:     Anticipated Barriers for therapy: transportation      Goals:  Short Term Goals: 4  visits   1. Pt will be independent c/ HEP.    Long Term Goals: 12 visits   1. Pt will improve LEFS to 75% limitation.  2. Pt will increase BLE to at least 4+/5 to improve functional mobility.  3.Pt will increase L knee ROM to 0-110 to improve functional mobility.  4. Pt will be able to ambulate 150 feet c/ LRAD to improve independence c/ functional mobility  5. Pt will decrease TUG time to less than or equal to 40 seconds.    Plan     Progress AMB tolerance as to be able to AMB to car at end of treatment session.    Plan of care Certification: 11/27/2019 to 2/22/20 or 12 visits.    Outpatient Physical Therapy 3 times weekly for 6 weeks to include the following interventions: Gait Training, Manual Therapy, Moist Heat/ Ice, Neuromuscular Re-ed, Patient Education, Self Care, Therapeutic Activites and Therapeutic Exercise, ASTYM, Kinesiotaping PRN, Functional Dry Needling    Grisel Porras, PT, DPT

## 2019-11-27 ENCOUNTER — CLINICAL SUPPORT (OUTPATIENT)
Dept: REHABILITATION | Facility: HOSPITAL | Age: 69
End: 2019-11-27
Payer: MEDICARE

## 2019-11-27 DIAGNOSIS — M25.662 DECREASED RANGE OF MOTION OF BOTH KNEES: ICD-10-CM

## 2019-11-27 DIAGNOSIS — M25.661 DECREASED RANGE OF MOTION OF BOTH KNEES: ICD-10-CM

## 2019-11-27 DIAGNOSIS — R29.898 WEAKNESS OF BOTH LOWER EXTREMITIES: ICD-10-CM

## 2019-11-27 DIAGNOSIS — R26.9 ABNORMALITY OF GAIT: ICD-10-CM

## 2019-11-27 PROCEDURE — 97110 THERAPEUTIC EXERCISES: CPT | Mod: PO | Performed by: PHYSICAL THERAPIST

## 2019-11-27 PROCEDURE — 97116 GAIT TRAINING THERAPY: CPT | Mod: 59,PO | Performed by: PHYSICAL THERAPIST

## 2019-11-27 PROCEDURE — 97530 THERAPEUTIC ACTIVITIES: CPT | Mod: PO | Performed by: PHYSICAL THERAPIST

## 2019-12-03 ENCOUNTER — OFFICE VISIT (OUTPATIENT)
Dept: ORTHOPEDICS | Facility: CLINIC | Age: 69
End: 2019-12-03
Payer: MEDICARE

## 2019-12-03 VITALS
HEIGHT: 62 IN | HEART RATE: 76 BPM | SYSTOLIC BLOOD PRESSURE: 134 MMHG | BODY MASS INDEX: 40.15 KG/M2 | DIASTOLIC BLOOD PRESSURE: 69 MMHG

## 2019-12-03 DIAGNOSIS — Z96.652 S/P TOTAL KNEE REPLACEMENT USING CEMENT, LEFT: Primary | ICD-10-CM

## 2019-12-03 PROCEDURE — 99024 POSTOP FOLLOW-UP VISIT: CPT | Mod: S$GLB,,, | Performed by: PHYSICIAN ASSISTANT

## 2019-12-03 PROCEDURE — 99999 PR PBB SHADOW E&M-EST. PATIENT-LVL IV: ICD-10-PCS | Mod: PBBFAC,,, | Performed by: PHYSICIAN ASSISTANT

## 2019-12-03 PROCEDURE — 99024 PR POST-OP FOLLOW-UP VISIT: ICD-10-PCS | Mod: S$GLB,,, | Performed by: PHYSICIAN ASSISTANT

## 2019-12-03 PROCEDURE — 99999 PR PBB SHADOW E&M-EST. PATIENT-LVL IV: CPT | Mod: PBBFAC,,, | Performed by: PHYSICIAN ASSISTANT

## 2019-12-03 RX ORDER — OXYCODONE HYDROCHLORIDE 5 MG/1
TABLET ORAL
Qty: 50 TABLET | Refills: 0 | Status: SHIPPED | OUTPATIENT
Start: 2019-12-03 | End: 2020-08-12

## 2019-12-03 NOTE — PROGRESS NOTES
Kamla Espinoza is here for her 1st post operative visit following a left Total Knee Arthroplasty  preformed by Dr Saucedo on @11/19/2019@. Her incision is clean and dry without evidence of drainage, discharge, erythema or echemyosis.  she was advised to keep the incision clean and dry for the next 24 hours after which she  may wash the area with antibacterial soap in the shower. Do not submerge until the incision is completely healed.     She is tolerating her pain medication well medication was refilled today. She has begun outpatient physical therapy and has an active range of motion of 3-90 degrees.         I will see her back in 4 weeks with new x-rays. If she has any problems at all she is to call the clinic immediately.

## 2019-12-06 ENCOUNTER — CLINICAL SUPPORT (OUTPATIENT)
Dept: REHABILITATION | Facility: HOSPITAL | Age: 69
End: 2019-12-06
Payer: MEDICARE

## 2019-12-06 DIAGNOSIS — M25.661 DECREASED RANGE OF MOTION OF BOTH KNEES: ICD-10-CM

## 2019-12-06 DIAGNOSIS — R26.9 ABNORMALITY OF GAIT: ICD-10-CM

## 2019-12-06 DIAGNOSIS — M25.662 DECREASED RANGE OF MOTION OF BOTH KNEES: ICD-10-CM

## 2019-12-06 DIAGNOSIS — R29.898 WEAKNESS OF BOTH LOWER EXTREMITIES: ICD-10-CM

## 2019-12-06 PROCEDURE — 97110 THERAPEUTIC EXERCISES: CPT | Mod: PO

## 2019-12-06 PROCEDURE — 97150 GROUP THERAPEUTIC PROCEDURES: CPT | Mod: PO

## 2019-12-06 NOTE — PROGRESS NOTES
"[facility]    Physical Therapy Daily Treatment Note     Name: Kamla Espinoza  Clinic Number: [id number]    Therapy Diagnosis:   Encounter Diagnoses   Name Primary?    Abnormality of gait     Decreased range of motion of both knees     Weakness of both lower extremities      Physician: Raffaele Saucedo MD    Physician Orders: PT Eval and Treat   Medical Diagnosis from Referral: M17.12 (ICD-10-CM) - Primary osteoarthritis of left knee  Surgical History:  Left TKA 11/19/19  Treatment Date: 12/6/2019  Authorization Period Expiration: 12/31/19  Plan of Care Expiration: 2/22/20 or 12 visits  Visit # / Visits authorized: 4/11        Time In: 10:20  Time Out: 1120  Total Billable Time: 30minutes     Precautions: Standard and Diabetes    Subjective     Patient reports pain level of 5/10 today.  Best 2/10  Worst 9/10 - awakens her at night. Takes pain medication which helps.  Patient is doing her exercises at home moderately well.  Using RW for ambulation about the home    History of current condition - Kamla reports: seeing surgeon whom removed bandages; open dermis distal incision noted; cleaned and band-aid placed advised cont warm water and running soap to keep cleaned. Feeling better since Monday when under the weather.  Patient and family support noted improved overall mobility, endurance, and strength with ADLs.    Prior Therapy: Yes  Social History: No stairs in home but does has 1 step to enter. lives with their family    Prior Level of Function: SPC for ambulation  Current Level of Function: RW    Pain:  Current 5/10, worst 9/10, best 2/10   Location: left knee   Easing Factors: pain medication, ice, rest and elevation   Pain post treatment 8/10 - "back of knee"    Pts goals: return to normal walking    Objective     Palpation: Mild TTP along superior knee.  Warmth noted on palpation      Range of Motion/Strength:     Knee Left   AROM/PROM    flexion  80// 85   extension  " 25//0     Bike circumduction performed 20-70deg 10min  // 75-15deg    L/E MMT Right Left   Hip Flexion 4-/5 3+/5   Hip Extension 4-/5 3+/5   Hip Abduction 4-/5 3+/5   Hip Adduction 4-/5 3+/5   Hip IR 4-/5 3+/5   Hip ER 4-/5 3+/5   Knee Flexion 4-/5 3+/5   Knee Extension 4-/5 3+/5   Ankle DF 4-/5 4-/5   Ankle PF 4-/5 4-/5     Gait Analysis:With AD.  Device Used -  Pick-up walker  Deviations: Decreased step length, moderate knee flexion throughout gait cycle.  B trendelenburg and decreased onelia. Forward bent trunk posture.      TU minutes and 56 seconds       Kamla received therapeutic exercises to develop strength, endurance, ROM and core stabilization for 40 minutes including:    Bike circumduction performed 10-80deg 5min   Quad Sets 5sec hold 3x10; LE ER log roll required to ensure LE neutral alignment w/o in-toeing  LE hooklyng ABD [place] TB 3x10  (B) Ankle pumps [id number]  Manual resistance - submaximal to all planes of motion to ankle - 10 reps (not performed today)   Heel slides supine through full available range of motion , 2 x 10 reps, verbal cuing for neutral LE alignment w/o in-toeing verbal cues for toe-ing out   Supine TKE with 1/2 roll under knee - good quad activation noted    Kamla received the following manual therapy techniques: Joint mobilizations and Soft tissue Mobilization were applied to the:(L) Knee for 10 minutes, including:  GI oscillation for TKE +AP 7x4  Patella mobilization in in all planes of motion X5 min       Kamla participated in dynamic functional therapeutic activities to improve functional performance for 11 minutes, including: STS training w/ gluteal EXT to prevent stooped posture and post LOB.  3 x 1 min with verbal cuing, SBA of PT (not performed today)     Kamla participated in gait training to improve functional mobility and safety for 15 minutes, including: AMB with standard walker from clinic to end of ortiz and return.  Periodic verbal cuing for heel strike,  LE alignment and gait sequencing requiring close SUP/CGA and transfer training from bike to treatment table [place] x1 with CGA x1 to ensure safety      Home Exercises Provided and Patient Education Provided     Education provided: need for TKE w/ neutral LE w/o valgus/IR tendencies; ICE +barrier & elevation at home    Written Home Exercises Provided: Patient instructed to cont prior HEP  Exercises were reviewed and Kamla was able to demonstrate them prior to the end of the session.  Kamla demonstrated good  understanding of the education provided.     See EMR under Media for exercises provided 11/25/2019.        Assessment   Kamla is a 69 y.o. female referred to outpatient Physical Therapy with a medical diagnosis of s/p L TKA. Pt presents with decreased R knee ROM as well as limitations in BLE strength. Pt was able to improve LE therex tolerating an increase in therex performing iso-hamstring curls to improve extneins      Pt performed therex with an increase in verbal and tactile cues to perform therex without deviations. Pt with increased time to perform therex secondary to pain. Pt continues to transfer with SBA assist however displays an improved posture during ambulation with RW.  Patient should continue to make good progress.  She requires skilled PT to reach her goals of functional ambulation, increase strength and range of motion.    Pt prognosis is Fair.   Pt will benefit from skilled outpatient Physical Therapy to address the deficits stated above and in the chart below, provide pt/family education, and to maximize pt's level of independence.     Plan of care discussed with patient: Yes  Pt's spiritual, cultural and educational needs considered and patient is agreeable to the plan of care and goals as stated below:     Anticipated Barriers for therapy: transportation      Goals:  Short Term Goals: 4 visits   1. Pt will be independent c/ HEP. 50% progress    Long Term Goals: 12 visits   1. Pt will  improve LEFS to 75% limitation.  2. Pt will increase BLE to at least 4+/5 to improve functional mobility.  3.Pt will increase L knee ROM to 0-110 to improve functional mobility. 25% progress  4. Pt will be able to ambulate 150 feet c/ LRAD to improve independence c/ functional mobility 50% progress  5. Pt will decrease TUG time to less than or equal to 40 seconds.    Plan     Progress knee TKE stability/mobility as well as improve ease of FF end range    Plan of care Certification: 12/6/2019 to 2/22/20 or 12 visits.    Outpatient Physical Therapy 3 times weekly for 6 weeks to include the following interventions: Gait Training, Manual Therapy, Moist Heat/ Ice, Neuromuscular Re-ed, Patient Education, Self Care, Therapeutic Activites and Therapeutic Exercise, ASTYM, Kinesiotaping PRN, Functional Dry Needling    Beny Tapia, PT, DPT

## 2019-12-09 ENCOUNTER — CLINICAL SUPPORT (OUTPATIENT)
Dept: REHABILITATION | Facility: HOSPITAL | Age: 69
End: 2019-12-09
Payer: MEDICARE

## 2019-12-09 DIAGNOSIS — R29.898 WEAKNESS OF BOTH LOWER EXTREMITIES: ICD-10-CM

## 2019-12-09 DIAGNOSIS — M25.662 DECREASED RANGE OF MOTION OF BOTH KNEES: ICD-10-CM

## 2019-12-09 DIAGNOSIS — M25.661 DECREASED RANGE OF MOTION OF BOTH KNEES: ICD-10-CM

## 2019-12-09 DIAGNOSIS — R26.9 ABNORMALITY OF GAIT: ICD-10-CM

## 2019-12-09 PROCEDURE — 97110 THERAPEUTIC EXERCISES: CPT | Mod: PO

## 2019-12-09 PROCEDURE — 97140 MANUAL THERAPY 1/> REGIONS: CPT | Mod: PO

## 2019-12-09 NOTE — PROGRESS NOTES
"[facility]    Physical Therapy Daily Treatment Note     Name: Kamla Espinoza  Clinic Number: [id number]    Therapy Diagnosis:   Encounter Diagnoses   Name Primary?    Abnormality of gait     Decreased range of motion of both knees     Weakness of both lower extremities      Physician: Raffaele Saucedo MD    Physician Orders: PT Eval and Treat   Medical Diagnosis from Referral: M17.12 (ICD-10-CM) - Primary osteoarthritis of left knee  Surgical History:  Left TKA 11/19/19  Treatment Date: 12/9/2019  Authorization Period Expiration: 12/31/19  Plan of Care Expiration: 2/22/20 or 12 visits  Visit # / Visits authorized: 5/11        Time In: 2:15 (pt arrived late)  Time Out: 3:00  Total Billable Time: 25 minutes     Precautions: Standard and Diabetes    Subjective     Patient reports pain level of 5/10 today.  Best 2/10  Worst 9/10 - awakens her at night. Takes pain medication which helps.  Patient is doing her exercises at home moderately well.  Using RW for ambulation about the home    History of current condition - Kamla reports: seeing surgeon whom removed bandages; open dermis distal incision noted; cleaned and band-aid placed advised cont warm water and running soap to keep cleaned. Feeling better since Monday when under the weather.  Patient and family support noted improved overall mobility, endurance, and strength with ADLs.    Prior Therapy: Yes  Social History: No stairs in home but does has 1 step to enter. lives with their family    Prior Level of Function: SPC for ambulation  Current Level of Function: RW    Pain:  Current 5/10,   Location: left knee   Easing Factors: pain medication, ice, rest and elevation   Pain post treatment 8/10 - "back of knee"    Pts goals: return to normal walking    Objective     Palpation: Mild TTP along superior knee.  Warmth noted on palpation      Range of Motion/Strength:     Knee Left   AROM/PROM    flexion  80// 85   extension  25//0 "     Bike circumduction performed 20-70deg 10min  // 75-15deg    L/E MMT Right Left   Hip Flexion 4-/5 3+/5   Hip Extension 4-/5 3+/5   Hip Abduction 4-/5 3+/5   Hip Adduction 4-/5 3+/5   Hip IR 4-/5 3+/5   Hip ER 4-/5 3+/5   Knee Flexion 4-/5 3+/5   Knee Extension 4-/5 3+/5   Ankle DF 4-/5 4-/5   Ankle PF 4-/5 4-/5     Gait Analysis:With AD.  Device Used -  Pick-up walker  Deviations: Decreased step length, moderate knee flexion throughout gait cycle.  B trendelenburg and decreased onelia. Forward bent trunk posture.      TU minutes and 56 seconds       Kamla received therapeutic exercises to develop strength, endurance, ROM and core stabilization for 25 minutes including:    Bike circumduction performed 10-80deg 5min - not performed today  Quad Sets 5sec hold 3x10; LE ER log roll required to ensure LE neutral alignment w/o in-toeing-  LE hooklyng ABD [place] TB 3x10  SLR 2 x 10  SAQ 2 x 10  (B) Ankle pumps [id number]  Manual resistance - submaximal to all planes of motion to ankle - 10 reps (not performed today)   Heel slides supine through full available range of motion , 2 x 10 reps, verbal cuing for neutral LE alignment w/o in-toeing verbal cues for toe-ing out   Supine TKE with 1/2 roll under knee - good quad activation noted    Kamla received the following manual therapy techniques: Joint mobilizations and Soft tissue Mobilization were applied to the:(L) Knee for 15 minutes, including:  GI oscillation for TKE +AP 7x4  Patella mobilization in in all planes of motion X5 min       Kamla participated in dynamic functional therapeutic activities to improve functional performance for 0 minutes, including: STS training w/ gluteal EXT to prevent stooped posture and post LOB.  3 x 1 min with verbal cuing, SBA of PT (not performed today)     Kamla participated in gait training to improve functional mobility and safety for 0 minutes, including: AMB with standard walker from clinic to end of ortiz and return.   Periodic verbal cuing for heel strike, LE alignment and gait sequencing requiring close SUP/CGA and transfer training from bike to treatment table [place] x1 with CGA x1 to ensure safety (not performed today)      Cold pack to L knee for 5 minutes    Home Exercises Provided and Patient Education Provided     Education provided: need for TKE w/ neutral LE w/o valgus/IR tendencies; ICE +barrier & elevation at home    Written Home Exercises Provided: Patient instructed to cont prior HEP  Exercises were reviewed and Kamla was able to demonstrate them prior to the end of the session.  Kamla demonstrated good  understanding of the education provided.     See EMR under Media for exercises provided 11/25/2019.        Assessment   Kamla is a 69 y.o. female referred to outpatient Physical Therapy with a medical diagnosis of s/p L TKA. Pt presents with decreased R knee ROM as well as limitations in BLE strength. Pt was able to improve LE therex tolerating an increase in therex performing iso-hamstring curls to improve extneins      Pt performed therex with an increase in verbal and tactile cues to perform therex without deviations.Pt arrived late to session therefore not all interventions were completed. Patient should continue to make good progress.  She requires skilled PT to reach her goals of functional ambulation, increase strength and range of motion.    Pt prognosis is Fair.   Pt will benefit from skilled outpatient Physical Therapy to address the deficits stated above and in the chart below, provide pt/family education, and to maximize pt's level of independence.     Plan of care discussed with patient: Yes  Pt's spiritual, cultural and educational needs considered and patient is agreeable to the plan of care and goals as stated below:     Anticipated Barriers for therapy: transportation      Goals:  Short Term Goals: 4 visits   1. Pt will be independent c/ HEP. 50% progress    Long Term Goals: 12 visits   1. Pt  will improve LEFS to 75% limitation.  2. Pt will increase BLE to at least 4+/5 to improve functional mobility.  3.Pt will increase L knee ROM to 0-110 to improve functional mobility. 25% progress  4. Pt will be able to ambulate 150 feet c/ LRAD to improve independence c/ functional mobility 50% progress  5. Pt will decrease TUG time to less than or equal to 40 seconds.    Plan     Progress knee TKE stability/mobility as well as improve ease of FF end range    Plan of care Certification: 12/9/2019 to 2/22/20 or 12 visits.    Outpatient Physical Therapy 3 times weekly for 6 weeks to include the following interventions: Gait Training, Manual Therapy, Moist Heat/ Ice, Neuromuscular Re-ed, Patient Education, Self Care, Therapeutic Activites and Therapeutic Exercise, ASTYM, Kinesiotaping PRN, Functional Dry Needling    Drew Dubose, PT, DPT

## 2019-12-11 NOTE — PROGRESS NOTES
"[facility]    Physical Therapy Daily Treatment Note     Name: Kamla Espinoza  Clinic Number: [id number]    Therapy Diagnosis:   Encounter Diagnoses   Name Primary?    Abnormality of gait     Decreased range of motion of both knees     Weakness of both lower extremities      Physician: Raffaele Saucedo MD    Physician Orders: PT Eval and Treat   Medical Diagnosis from Referral: M17.12 (ICD-10-CM) - Primary osteoarthritis of left knee  Surgical History:  Left TKA 11/19/19  Treatment Date: 12/12/2019  Authorization Period Expiration: 12/31/19  Plan of Care Expiration: 2/22/20 or 12 visits  Visit # / Visits authorized: 6/11        Time In: 9:00  Time Out: 10:00  Total Billable Time: 30 minutes     Precautions: Standard and Diabetes    Subjective     Patient reports that she took her pain medication today because it was 7/10 this AM, this pain level has decreased.    Patient is doing her exercises at home moderately well.  Using RW for ambulation about the home    History of current condition - Kamla reports: seeing surgeon whom removed bandages; open dermis distal incision noted; cleaned and band-aid placed advised cont warm water and running soap to keep cleaned. Feeling better since Monday when under the weather.  Patient and family support noted improved overall mobility, endurance, and strength with ADLs.    Prior Therapy: Yes  Social History: No stairs in home but does has 1 step to enter. lives with their family    Prior Level of Function: SPC for ambulation  Current Level of Function: RW    Pain:  Current 5/10,   Location: left knee   Easing Factors: pain medication, ice, rest and elevation   Pain post treatment 8/10 - "back of knee"    Pts goals: return to normal walking    Objective     Palpation: Mild TTP along superior knee.  Warmth noted on palpation      Range of Motion/Strength:     Knee Left   AROM/PROM    flexion  95// 110   extension  5//0     Bike circumduction " "performed 20-70deg 10min  // 75-15deg    L/E MMT Right Left   Hip Flexion 4-/5 3+/5   Hip Extension 4-/5 3+/5   Hip Abduction 4-/5 3+/5   Hip Adduction 4-/5 3+/5   Hip IR 4-/5 3+/5   Hip ER 4-/5 3+/5   Knee Flexion 4-/5 3+/5   Knee Extension 4-/5 3+/5   Ankle DF 4-/5 4-/5   Ankle PF 4-/5 4-/5     Gait Analysis:With AD.  Device Used -  Pick-up walker  Deviations: Decreased step length, moderate knee flexion throughout gait cycle.  B trendelenburg and decreased onelia. Forward bent trunk posture.      TU minutes and 56 seconds       Kamla received therapeutic exercises to develop strength, endurance, ROM and core stabilization for 40 minutes including:    Bike circumduction performed 10-80deg 5min - not performed today  Quad Sets 5sec hold 3x10; LE ER log roll required to ensure LE neutral alignment w/o in-toeing-  LE hooklyng ABD [place] TB 3x10  SLR 2 x 10  SAQ 2 x 10  (B) Ankle pumps [id number]  Manual resistance - submaximal to all planes of motion to ankle - 10 reps (not performed today)   Heel slides supine through full available range of motion , 2 x 10 reps, verbal cuing for neutral LE alignment w/o in-toeing verbal cues for toe-ing out -  Supine TKE with 1/2 roll under knee - good quad activation noted    Gait stretch on Lvl4 20x5"  Standing hamstring curls 2 x 10  [place] 1 x 10    Kamla received the following manual therapy techniques: Joint mobilizations and Soft tissue Mobilization were applied to the:(L) Knee for 10 minutes, including:  GI oscillation for TKE +AP 7x4  Patella mobilization in in all planes of motion X5 min       Kamla participated in dynamic functional therapeutic activities to improve functional performance for 0 minutes, including: STS training w/ gluteal EXT to prevent stooped posture and post LOB.  3 x 1 min with verbal cuing, SBA of PT (not performed today)     Kamla participated in gait training to improve functional mobility and safety for 0 minutes, including: AMB " with standard walker from clinic to end of ortiz and return.  Periodic verbal cuing for heel strike, LE alignment and gait sequencing requiring close SUP/CGA and transfer training from bike to treatment table [place] x1 with CGA x1 to ensure safety (not performed today)      Cold pack to L knee for 10 minutes    Home Exercises Provided and Patient Education Provided     Education provided: need for TKE w/ neutral LE w/o valgus/IR tendencies; ICE +barrier & elevation at home    Written Home Exercises Provided: Patient instructed to cont prior HEP  Exercises were reviewed and Kamla was able to demonstrate them prior to the end of the session.  Kamla demonstrated good  understanding of the education provided.     See EMR under Media for exercises provided 11/25/2019.        Assessment   Kamla is a 69 y.o. female referred to outpatient Physical Therapy with a medical diagnosis of s/p L TKA. Pt presents with decreased R knee ROM as well as limitations in BLE strength. PT will begin gait training using quad cane at next session.    Pt performed therex with an increase in verbal and tactile cues to perform therex without deviations  Pt c/ improved AROM and PROM compared to initial evaluation.  Patient should continue to make good progress.  She requires skilled PT to reach her goals of functional ambulation, increase strength and range of motion.    Pt prognosis is Fair.   Pt will benefit from skilled outpatient Physical Therapy to address the deficits stated above and in the chart below, provide pt/family education, and to maximize pt's level of independence.     Plan of care discussed with patient: Yes  Pt's spiritual, cultural and educational needs considered and patient is agreeable to the plan of care and goals as stated below:     Anticipated Barriers for therapy: transportation      Goals:  Short Term Goals: 4 visits   1. Pt will be independent c/ HEP. 50% progress    Long Term Goals: 12 visits   1. Pt will  improve LEFS to 75% limitation.  2. Pt will increase BLE to at least 4+/5 to improve functional mobility.  3.Pt will increase L knee ROM to 0-110 to improve functional mobility. 25% progress  4. Pt will be able to ambulate 150 feet c/ LRAD to improve independence c/ functional mobility 50% progress  5. Pt will decrease TUG time to less than or equal to 40 seconds.    Plan     Progress knee TKE stability/mobility as well as improve ease of FF end range.  Begin gait training using quad cane at next session.    Plan of care Certification: 12/12/2019 to 2/22/20 or 12 visits.    Outpatient Physical Therapy 3 times weekly for 6 weeks to include the following interventions: Gait Training, Manual Therapy, Moist Heat/ Ice, Neuromuscular Re-ed, Patient Education, Self Care, Therapeutic Activites and Therapeutic Exercise, ASTYM, Kinesiotaping PRN, Functional Dry Needling    Drew Dubose, PT, DPT

## 2019-12-12 ENCOUNTER — CLINICAL SUPPORT (OUTPATIENT)
Dept: REHABILITATION | Facility: HOSPITAL | Age: 69
End: 2019-12-12
Payer: MEDICARE

## 2019-12-12 DIAGNOSIS — M25.661 DECREASED RANGE OF MOTION OF BOTH KNEES: ICD-10-CM

## 2019-12-12 DIAGNOSIS — R29.898 WEAKNESS OF BOTH LOWER EXTREMITIES: ICD-10-CM

## 2019-12-12 DIAGNOSIS — R26.9 ABNORMALITY OF GAIT: ICD-10-CM

## 2019-12-12 DIAGNOSIS — M25.662 DECREASED RANGE OF MOTION OF BOTH KNEES: ICD-10-CM

## 2019-12-12 PROCEDURE — 97110 THERAPEUTIC EXERCISES: CPT | Mod: PO

## 2019-12-12 PROCEDURE — 97140 MANUAL THERAPY 1/> REGIONS: CPT | Mod: PO

## 2019-12-16 ENCOUNTER — CLINICAL SUPPORT (OUTPATIENT)
Dept: REHABILITATION | Facility: HOSPITAL | Age: 69
End: 2019-12-16
Payer: MEDICARE

## 2019-12-16 DIAGNOSIS — R26.9 ABNORMALITY OF GAIT: ICD-10-CM

## 2019-12-16 DIAGNOSIS — M25.661 DECREASED RANGE OF MOTION OF BOTH KNEES: ICD-10-CM

## 2019-12-16 DIAGNOSIS — R29.898 WEAKNESS OF BOTH LOWER EXTREMITIES: ICD-10-CM

## 2019-12-16 DIAGNOSIS — M25.662 DECREASED RANGE OF MOTION OF BOTH KNEES: ICD-10-CM

## 2019-12-16 PROCEDURE — 97110 THERAPEUTIC EXERCISES: CPT | Mod: PO

## 2019-12-16 PROCEDURE — 97140 MANUAL THERAPY 1/> REGIONS: CPT | Mod: PO

## 2019-12-16 NOTE — PROGRESS NOTES
[facility]    Physical Therapy Daily Treatment Note     Name: Kamla Espinoza  Clinic Number: [id number]    Therapy Diagnosis:   Encounter Diagnoses   Name Primary?    Abnormality of gait     Decreased range of motion of both knees     Weakness of both lower extremities      Physician: Raffaele Saucedo MD    Physician Orders: PT Eval and Treat   Medical Diagnosis from Referral: M17.12 (ICD-10-CM) - Primary osteoarthritis of left knee  Surgical History:  Left TKA 11/19/19  Treatment Date: 12/16/2019  Authorization Period Expiration: 12/31/19  Plan of Care Expiration: 2/22/20 or 12 visits  Visit # / Visits authorized: 7/11        Time In: 11:03  Time Out: 12:03  Total Billable Time: 50 minutes     Precautions: Standard and Diabetes    Subjective     Patient reports that she took tylenol earlier that alleviated her pain but right now she is in 5/10 pain. Her incision has been draining so she has been donning bandages on incision.    Patient is doing her exercises at home moderately well.  Using RW for ambulation about the home    History of current condition - Kamla reports: seeing surgeon whom removed bandages; open dermis distal incision noted; cleaned and band-aid placed advised cont warm water and running soap to keep cleaned. Feeling better since Monday when under the weather.  Patient and family support noted improved overall mobility, endurance, and strength with ADLs.    Prior Therapy: Yes  Social History: No stairs in home but does has 1 step to enter. lives with their family    Prior Level of Function: SPC for ambulation  Current Level of Function: RW    Pts goals: return to normal walking    Objective     Palpation: Mild TTP along superior knee.  Warmth noted on palpation      Range of Motion/Strength:     Knee Left   AROM/PROM    flexion  95// 110   extension  5//0     Bike circumduction performed 20-70deg 10min  // 75-15deg    L/E MMT Right Left   Hip Flexion 4-/5  "3+/5   Hip Extension 4-/5 3+/5   Hip Abduction 4-/5 3+/5   Hip Adduction 4-/5 3+/5   Hip IR 4-/5 3+/5   Hip ER 4-/5 3+/5   Knee Flexion 4-/5 3+/5   Knee Extension 4-/5 3+/5   Ankle DF 4-/5 4-/5   Ankle PF 4-/5 4-/5     Gait Analysis:With AD.  Device Used -  Pick-up walker  Deviations: Decreased step length, moderate knee flexion throughout gait cycle.  B trendelenburg and decreased onelia. Forward bent trunk posture.      TU minutes and 56 seconds       Kamla received therapeutic exercises to develop strength, endurance, ROM and core stabilization for 40 minutes including:    Bike circumduction performed 10-80deg 5min - not performed today  Quad Sets 5sec hold 3x10; LE ER log roll required to ensure LE neutral alignment w/o in-toeing-  LE hooklyng ABD [place] TB 3x10  SLR 2 x 10  SAQ 2 x 10  Supine hip abduction 2 x 10  Ball squeezes x 2 minutes  (B) Ankle pumps [id number]  Manual resistance - submaximal to all planes of motion to ankle - 10 reps (not performed today)   Heel slides supine through full available range of motion , 2 x 10 reps, verbal cuing for neutral LE alignment w/o in-toeing verbal cues for toe-ing out -  Supine TKE with 1/2 roll under knee - good quad activation noted  Supine hamstring stretch c/ strap 5 x 10"    Gait stretch on Lvl4 20x5"  Standing hamstring curls 2 x 10  [place] 1 x 10    Kamla received the following manual therapy techniques: Joint mobilizations and Soft tissue Mobilization were applied to the:(L) Knee for 10 minutes, including:  GI oscillation for TKE +AP 7x4  Patella mobilization in in all planes of motion X5 min       Kamla participated in dynamic functional therapeutic activities to improve functional performance for 0 minutes, including: STS training w/ gluteal EXT to prevent stooped posture and post LOB.  3 x 1 min with verbal cuing, SBA of PT (not performed today)     Kamla participated in gait training to improve functional mobility and safety for 0 minutes, " including: AMB with standard walker from clinic to end of ortiz and return.  Periodic verbal cuing for heel strike, LE alignment and gait sequencing requiring close SUP/CGA and transfer training from bike to treatment table [place] x1 with CGA x1 to ensure safety (not performed today)      Cold pack to L knee for 10 minutes    Home Exercises Provided and Patient Education Provided     Education provided: need for TKE w/ neutral LE w/o valgus/IR tendencies; ICE +barrier & elevation at home    Written Home Exercises Provided: Patient instructed to cont prior HEP  Exercises were reviewed and Kamla was able to demonstrate them prior to the end of the session.  Kamla demonstrated good  understanding of the education provided.     See EMR under Media for exercises provided 11/25/2019.        Assessment   Kamla is a 69 y.o. female referred to outpatient Physical Therapy with a medical diagnosis of s/p L TKA. Pt presents with decreased R knee ROM as well as limitations in BLE strength. PT will begin gait training using quad cane at next session.    Pt performed therex with an increase in verbal and tactile cues to perform therex without deviations.  PT continues to focus on improving knee ROM and BLE strength.  Pt c/ improved AROM and PROM compared to initial evaluation.  Patient should continue to make good progress.  She requires skilled PT to reach her goals of functional ambulation, increase strength and range of motion.    Pt prognosis is Fair.   Pt will benefit from skilled outpatient Physical Therapy to address the deficits stated above and in the chart below, provide pt/family education, and to maximize pt's level of independence.     Plan of care discussed with patient: Yes  Pt's spiritual, cultural and educational needs considered and patient is agreeable to the plan of care and goals as stated below:     Anticipated Barriers for therapy: transportation      Goals:  Short Term Goals: 4 visits   1. Pt will be  independent c/ HEP. 50% progress    Long Term Goals: 12 visits   1. Pt will improve LEFS to 75% limitation.  2. Pt will increase BLE to at least 4+/5 to improve functional mobility.  3.Pt will increase L knee ROM to 0-110 to improve functional mobility. 25% progress  4. Pt will be able to ambulate 150 feet c/ LRAD to improve independence c/ functional mobility 50% progress  5. Pt will decrease TUG time to less than or equal to 40 seconds.    Plan     Progress knee TKE stability/mobility as well as improve ease of FF end range.  Begin gait training using quad cane at next session.    Plan of care Certification: 12/16/2019 to 2/22/20 or 12 visits.    Outpatient Physical Therapy 3 times weekly for 6 weeks to include the following interventions: Gait Training, Manual Therapy, Moist Heat/ Ice, Neuromuscular Re-ed, Patient Education, Self Care, Therapeutic Activites and Therapeutic Exercise, ASTYM, Kinesiotaping PRN, Functional Dry Needling    Drew Dubose, PT, DPT

## 2019-12-26 ENCOUNTER — CLINICAL SUPPORT (OUTPATIENT)
Dept: REHABILITATION | Facility: HOSPITAL | Age: 69
End: 2019-12-26
Payer: MEDICARE

## 2019-12-26 DIAGNOSIS — R29.898 WEAKNESS OF BOTH LOWER EXTREMITIES: ICD-10-CM

## 2019-12-26 DIAGNOSIS — M25.662 DECREASED RANGE OF MOTION OF BOTH KNEES: ICD-10-CM

## 2019-12-26 DIAGNOSIS — M25.661 DECREASED RANGE OF MOTION OF BOTH KNEES: ICD-10-CM

## 2019-12-26 DIAGNOSIS — R26.9 ABNORMALITY OF GAIT: ICD-10-CM

## 2019-12-26 PROCEDURE — 97110 THERAPEUTIC EXERCISES: CPT | Mod: PO

## 2019-12-26 PROCEDURE — 97140 MANUAL THERAPY 1/> REGIONS: CPT | Mod: PO

## 2019-12-26 NOTE — PROGRESS NOTES
[facility]    Physical Therapy Daily Treatment Note     Name: Kamla Espinoza  Clinic Number: [id number]    Therapy Diagnosis:   No diagnosis found.  Physician: Raffaele Saucedo MD    Physician Orders: PT Eval and Treat   Medical Diagnosis from Referral: M17.12 (ICD-10-CM) - Primary osteoarthritis of left knee  Surgical History:  Left TKA 11/19/19  Treatment Date: 12/26/2019  Authorization Period Expiration: 12/31/19  Plan of Care Expiration: 2/22/20 or 12 visits  Visit # / Visits authorized: 8/11        Time In: 3:00  Time Out: 4:00  Total Billable Time: 60 minutes     Precautions: Standard and Diabetes    Subjective     Pt reports her pain is 7/10 during treatment session. Pt stated her pain is often 10/10 until medication is taken to reduce the pain.     Pt continues to ambulate into clinic with RW and gait deviations present. However Pt states she occasionally ambulates with AD to improve tolerance for ambulation.     Patient is doing her exercises at home moderately well.  Using RW for ambulation about the home    History of current condition - Kamla reports: seeing surgeon whom removed bandages; open dermis distal incision noted; cleaned and band-aid placed advised cont warm water and running soap to keep cleaned. Feeling better since Monday when under the weather.  Patient and family support noted improved overall mobility, endurance, and strength with ADLs.    Prior Therapy: Yes  Social History: No stairs in home but does has 1 step to enter. lives with their family    Prior Level of Function: SPC for ambulation  Current Level of Function: RW    Pts goals: return to normal walking    Objective     Palpation: Mild TTP along superior knee.  Warmth noted on palpation      Range of Motion/Strength:     Knee Left   AROM/PROM    flexion  [id number]   extension  5//0     Bike circumduction performed 20-70deg 10min  // 75-15deg    L/E MMT Right Left   Hip Flexion 4-/5 3+/5   Hip  "Extension 4-/5 3+/5   Hip Abduction 4-/5 3+/5   Hip Adduction 4-/5 3+/5   Hip IR 4-/5 3+/5   Hip ER 4-/5 3+/5   Knee Flexion 4-/5 3+/5   Knee Extension 4-/5 3+/5   Ankle DF 4-/5 4-/5   Ankle PF 4-/5 4-/5     Gait Analysis:With AD.  Device Used -  Pick-up walker  Deviations: Decreased step length, moderate knee flexion throughout gait cycle.  B trendelenburg and decreased onelia. Forward bent trunk posture.      TU minutes and 56 seconds       Kamla received therapeutic exercises to develop strength, endurance, ROM and core stabilization for 40 minutes including:    Bike circumduction performed 10-80deg 5min - not performed today  Quad Sets 5sec hold 3x10; LE ER log roll required to ensure LE neutral alignment w/o in-toeing-  LE hooklyng ABD Green TB 3x10  SLR 2 x 10  SAQ 2 x 10  Supine hip abduction 2 x 10  Ball squeezes x 2 minutes  (B) Ankle pumps [id number]  Manual resistance - submaximal to all planes of motion to ankle - 10 reps (not performed today)   Heel slides supine through full available range of motion , 2 x 10 reps, verbal cuing for neutral LE alignment w/o in-toeing verbal cues for toe-ing out -  Supine TKE with 1/2 roll under knee - good quad activation noted  Supine hamstring stretch c/ strap 5 x 10"NP  Hamstring stretch prone prop with ice 5 min    Gait stretch on Lvl4 20x5"  Standing hamstring curls 2 x 10  Steamboats 1 x 15  Heel raises   Mini Squats X30   Semi-tandem standing 3'       Kamla received the following manual therapy techniques: Joint mobilizations and Soft tissue Mobilization were applied to the:(L) Knee for 10 minutes, including:  GI oscillation for TKE +AP 7x4  Patella mobilization in in all planes of motion X5 min       Kamla participated in dynamic functional therapeutic activities to improve functional performance for 0 minutes, including: STS training w/ gluteal EXT to prevent stooped posture and post LOB.  3 x 1 min with verbal cuing, SBA of PT (not performed today) "     Kamla participated in gait training to improve functional mobility and safety for 5 minutes, including: AMB with standard quad cane verbal cuing for heel strike, upright posture and sequencing of AD , LE alignment and gait sequencing requiring close SUP/CGA and transfer training from bike to treatment table Catalina x1 with CGA x1 to ensure safety (not performed today)      Cold pack to L knee for 5 minutes with therex     Home Exercises Provided and Patient Education Provided     Education provided: need for TKE w/ neutral LE w/o valgus/IR tendencies; ICE +barrier & elevation at home    Written Home Exercises Provided: Patient instructed to cont prior HEP  Exercises were reviewed and Kamla was able to demonstrate them prior to the end of the session.  Kamla demonstrated good  understanding of the education provided.     See EMR under Media for exercises provided 11/25/2019.        Assessment     Pt presented to treatment session with reports of increased pain of the left knee prior to treatment session with a reduction in pain following today's treatment session rated 5/10. Pt tolerated an increase in standing therex without adverse events. Pt however required a seated rest break during therex secondary to decreased endurance. Pt was brennan to improve upright posture with ambulation and A/PROM knee flexion to 110.       Pt prognosis is Fair.   Pt will benefit from skilled outpatient Physical Therapy to address the deficits stated above and in the chart below, provide pt/family education, and to maximize pt's level of independence.     Plan of care discussed with patient: Yes  Pt's spiritual, cultural and educational needs considered and patient is agreeable to the plan of care and goals as stated below:     Anticipated Barriers for therapy: transportation      Goals:  Short Term Goals: 4 visits   1. Pt will be independent c/ HEP. 50% progress    Long Term Goals: 12 visits   1. Pt will improve LEFS to 75%  limitation.  2. Pt will increase BLE to at least 4+/5 to improve functional mobility.  3.Pt will increase L knee ROM to 0-110 to improve functional mobility. 25% progress  4. Pt will be able to ambulate 150 feet c/ LRAD to improve independence c/ functional mobility 50% progress  5. Pt will decrease TUG time to less than or equal to 40 seconds.    Plan     Progress knee TKE stability/mobility as well as improve ease of FF end range.  Begin gait training using quad cane at next session.    Plan of care Certification: 12/26/2019 to 2/22/20 or 12 visits.    Outpatient Physical Therapy 3 times weekly for 6 weeks to include the following interventions: Gait Training, Manual Therapy, Moist Heat/ Ice, Neuromuscular Re-ed, Patient Education, Self Care, Therapeutic Activites and Therapeutic Exercise, ASTYM, Kinesiotaping PRN, Functional Dry Needling    Jayme Da Silva, PT, DPT

## 2019-12-31 ENCOUNTER — OFFICE VISIT (OUTPATIENT)
Dept: ORTHOPEDICS | Facility: CLINIC | Age: 69
End: 2019-12-31
Payer: MEDICARE

## 2019-12-31 ENCOUNTER — HOSPITAL ENCOUNTER (OUTPATIENT)
Dept: RADIOLOGY | Facility: HOSPITAL | Age: 69
Discharge: HOME | End: 2019-12-31
Attending: NURSE PRACTITIONER
Payer: MEDICARE

## 2019-12-31 VITALS — HEIGHT: 62 IN | BODY MASS INDEX: 39.75 KG/M2 | WEIGHT: 216 LBS

## 2019-12-31 DIAGNOSIS — Z96.652 STATUS POST LEFT KNEE REPLACEMENT: ICD-10-CM

## 2019-12-31 DIAGNOSIS — Z96.652 STATUS POST LEFT KNEE REPLACEMENT: Primary | ICD-10-CM

## 2019-12-31 PROCEDURE — 99999 PR PBB SHADOW E&M-EST. PATIENT-LVL III: ICD-10-PCS | Mod: PBBFAC,,, | Performed by: NURSE PRACTITIONER

## 2019-12-31 PROCEDURE — 99024 PR POST-OP FOLLOW-UP VISIT: ICD-10-PCS | Mod: S$GLB,,, | Performed by: NURSE PRACTITIONER

## 2019-12-31 PROCEDURE — 73560 X-RAY EXAM OF KNEE 1 OR 2: CPT | Mod: TC,RT

## 2019-12-31 PROCEDURE — 99024 POSTOP FOLLOW-UP VISIT: CPT | Mod: S$GLB,,, | Performed by: NURSE PRACTITIONER

## 2019-12-31 PROCEDURE — 73562 XR KNEE ORTHO LEFT: ICD-10-PCS | Mod: 26,LT,, | Performed by: RADIOLOGY

## 2019-12-31 PROCEDURE — 73560 XR KNEE ORTHO LEFT: ICD-10-PCS | Mod: 26,59,RT, | Performed by: RADIOLOGY

## 2019-12-31 PROCEDURE — 99999 PR PBB SHADOW E&M-EST. PATIENT-LVL III: CPT | Mod: PBBFAC,,, | Performed by: NURSE PRACTITIONER

## 2019-12-31 PROCEDURE — 73560 X-RAY EXAM OF KNEE 1 OR 2: CPT | Mod: 26,59,RT, | Performed by: RADIOLOGY

## 2019-12-31 PROCEDURE — 73562 X-RAY EXAM OF KNEE 3: CPT | Mod: 26,LT,, | Performed by: RADIOLOGY

## 2019-12-31 RX ORDER — HYDROCODONE BITARTRATE AND ACETAMINOPHEN 10; 325 MG/1; MG/1
1 TABLET ORAL EVERY 4 HOURS PRN
Qty: 42 TABLET | Refills: 0 | Status: SHIPPED | OUTPATIENT
Start: 2019-12-31 | End: 2020-01-10

## 2019-12-31 NOTE — PROGRESS NOTES
"Kamla Espinoza presents for 6 week post-operative visit following a left total knee arthroplasty performed by Dr. Saucedo on 11/19/2019. Tolerating pain medication well.      Exam:   Height 5' 1.5" (1.562 m), weight 98 kg (216 lb).   Ambulating well with assistive device.  Incision is clean and dry. There is a small area of slough noted to the distal incision. Lillie Harmon came to the room to evaluate and hydrocolloid dressing was placed over the area. She will change in 4 days and f/u as scheduled for another wound check. ROM:0-100    Post-operative radiographs reviewed today revealing a well fixed and aligned prosthesis.    A/P:  6 weeks s/p left total knee replacement  -  Wound care as discussed  - Outpatient PT: ongoing at [facility]  - Pain medication refilled last week  - Follow up in 2 weeks with me for wound check. Pt will call clinic with problems/concerns.     "

## 2020-01-09 ENCOUNTER — CLINICAL SUPPORT (OUTPATIENT)
Dept: REHABILITATION | Facility: HOSPITAL | Age: 70
End: 2020-01-09
Payer: MEDICARE

## 2020-01-09 DIAGNOSIS — M25.661 DECREASED RANGE OF MOTION OF BOTH KNEES: ICD-10-CM

## 2020-01-09 DIAGNOSIS — R26.9 ABNORMALITY OF GAIT: ICD-10-CM

## 2020-01-09 DIAGNOSIS — R29.898 WEAKNESS OF BOTH LOWER EXTREMITIES: ICD-10-CM

## 2020-01-09 DIAGNOSIS — M25.662 DECREASED RANGE OF MOTION OF BOTH KNEES: ICD-10-CM

## 2020-01-09 PROCEDURE — 97110 THERAPEUTIC EXERCISES: CPT | Mod: PO

## 2020-01-09 NOTE — PROGRESS NOTES
[facility]    Physical Therapy Daily Treatment Note     Name: Kamla Espinoza  Clinic Number: [id number]    Therapy Diagnosis:   Encounter Diagnoses   Name Primary?    Abnormality of gait     Decreased range of motion of both knees     Weakness of both lower extremities      Physician: Raffaele Saucedo MD    Physician Orders: PT Eval and Treat   Medical Diagnosis from Referral: M17.12 (ICD-10-CM) - Primary osteoarthritis of left knee  Surgical History:  Left TKA 11/19/19  Treatment Date: 1/9/2020  Authorization Period Expiration: 12/31/19  Plan of Care Expiration: 2/22/20 or 12 visits  Visit # / Visits authorized: 9/11        Time In: 11:00  Time Out: 12:00  Total Billable Time: 30 minutes     Precautions: Standard and Diabetes    Subjective     Pt reports her pain is 5/10 during treatment session. Pt reported her pain is now on the lateral side of her patella and was reduced with MFR.     Pt continues to ambulate into clinic with RW and gait deviations present. Pt was able to decreased UE WB on the RW during ambulation     Patient is doing her exercises at home moderately well.  Using RW for ambulation about the home. Continued     History of current condition - Kamla reports: seeing surgeon whom removed bandages; open dermis distal incision noted; cleaned and band-aid placed advised cont warm water and running soap to keep cleaned. Feeling better since Monday when under the weather.  Patient and family support noted improved overall mobility, endurance, and strength with ADLs.    Prior Therapy: Yes  Social History: No stairs in home but does has 1 step to enter. lives with their family    Prior Level of Function: SPC for ambulation  Current Level of Function: RW    Pts goals: return to normal walking    Objective       Kamla received therapeutic exercises to develop strength, endurance, ROM and core stabilization for 40 minutes including:    Bike circumduction performed  "10-80deg 5min - not performed today  Quad Sets 5sec hold 3x10; LE ER log roll required to ensure LE neutral alignment w/o in-toeing-  LE hooklyng ABD Green TB 3x10  SLR 2 x 10  SAQ 2 x 10  Supine hip abduction 2 x 10  Ball squeezes x 2 minutes  (B) Ankle pumps [id number]  Manual resistance - submaximal to all planes of motion to ankle - 10 reps (not performed today)   Heel slides supine through full available range of motion , 2 x 10 reps, verbal cuing for neutral LE alignment w/o in-toeing verbal cues for toe-ing out -  Supine TKE with 1/2 roll under knee - good quad activation noted  Supine hamstring stretch c/ strap 5 x 10"NP  Hamstring stretch prone prop with ice 5 min  Seated DF X 30 reps     Gait stretch on Lvl4 20x5"  Standing hamstring curls 2 x 10  Steamboats 1 x 15  Heel raises   Mini Squats X30   Semi-tandem standing 3'       Kamla received the following manual therapy techniques: Joint mobilizations and Soft tissue Mobilization were applied to the:(L) Knee for 10 minutes, including:  GI oscillation for TKE +AP 7x4  Patella mobilization in in all planes of motion X5 min   MFR to IT band       Kamla participated in dynamic functional therapeutic activities to improve functional performance for 0 minutes, including: STS training w/ gluteal EXT to prevent stooped posture and post LOB.  3 x 1 min with verbal cuing, SBA of PT (not performed today)     Kamla participated in gait training to improve functional mobility and safety for 5 minutes, including: AMB with standard quad cane verbal cuing for heel strike, and improved ecc. Control of the LLE during ambulation period      Cold pack to L knee for 5 minutes with therex     Home Exercises Provided and Patient Education Provided     Education provided: need for TKE w/ neutral LE w/o valgus/IR tendencies; ICE +barrier & elevation at home    Written Home Exercises Provided: Patient instructed to cont prior HEP  Exercises were reviewed and Kamla was able to " demonstrate them prior to the end of the session.  Kamla demonstrated good  understanding of the education provided.     See EMR under Media for exercises provided 11/25/2019.        Assessment       Pt present today with continued displays of gait deviations secondary to weight acceptance during ambulation period. Pt was able to improve gait deviations with verbal cueing however continues to display them. Pt was able to improve knee extension with IT band MFR. Pt continues to display decreased endurance with standing therex requesting several seated rest breaks and will continue to benefit from therapy.       Pt prognosis is Fair.   Pt will benefit from skilled outpatient Physical Therapy to address the deficits stated above and in the chart below, provide pt/family education, and to maximize pt's level of independence.     Plan of care discussed with patient: Yes  Pt's spiritual, cultural and educational needs considered and patient is agreeable to the plan of care and goals as stated below:     Anticipated Barriers for therapy: transportation      Goals:  Short Term Goals: 4 visits   1. Pt will be independent c/ HEP. 50% progress    Long Term Goals: 12 visits   1. Pt will improve LEFS to 75% limitation.  2. Pt will increase BLE to at least 4+/5 to improve functional mobility.  3.Pt will increase L knee ROM to 0-110 to improve functional mobility. 25% progress  4. Pt will be able to ambulate 150 feet c/ LRAD to improve independence c/ functional mobility 50% progress  5. Pt will decrease TUG time to less than or equal to 40 seconds.    Plan     Progress knee TKE stability/mobility as well as improve ease of FF end range.  Begin gait training using quad cane at next session.    Plan of care Certification: 1/9/2020 to 2/22/20 or 12 visits.    Outpatient Physical Therapy 3 times weekly for 6 weeks to include the following interventions: Gait Training, Manual Therapy, Moist Heat/ Ice, Neuromuscular Re-ed, Patient  Education, Self Care, Therapeutic Activites and Therapeutic Exercise, JULIA, Kinesiotaping PRN, Functional Dry Needling    Jayme Da Silva, PT, DPT

## 2020-01-14 ENCOUNTER — CLINICAL SUPPORT (OUTPATIENT)
Dept: REHABILITATION | Facility: HOSPITAL | Age: 70
End: 2020-01-14
Payer: MEDICARE

## 2020-01-14 DIAGNOSIS — R29.898 WEAKNESS OF BOTH LOWER EXTREMITIES: ICD-10-CM

## 2020-01-14 DIAGNOSIS — R26.9 ABNORMALITY OF GAIT: ICD-10-CM

## 2020-01-14 DIAGNOSIS — M25.661 DECREASED RANGE OF MOTION OF BOTH KNEES: ICD-10-CM

## 2020-01-14 DIAGNOSIS — M25.662 DECREASED RANGE OF MOTION OF BOTH KNEES: ICD-10-CM

## 2020-01-14 PROCEDURE — 97110 THERAPEUTIC EXERCISES: CPT | Mod: PO

## 2020-01-14 PROCEDURE — 97140 MANUAL THERAPY 1/> REGIONS: CPT | Mod: PO

## 2020-01-14 NOTE — PROGRESS NOTES
[facility]    Physical Therapy Daily Treatment Note     Name: Kamla Espinoza  Clinic Number: [id number]    Therapy Diagnosis:   Encounter Diagnoses   Name Primary?    Abnormality of gait     Decreased range of motion of both knees     Weakness of both lower extremities      Physician: Raffaele Saucedo MD    Physician Orders: PT Eval and Treat   Medical Diagnosis from Referral: M17.12 (ICD-10-CM) - Primary osteoarthritis of left knee  Surgical History:  Left TKA 11/19/19  Treatment Date: 1/14/2020  Authorization Period Expiration: 12/31/19  Plan of Care Expiration: 2/22/20 or 12 visits  Visit # / Visits authorized: 9/11        Time In: 11:100  Time Out: 12:10  Total Billable Time: 60 minutes     Precautions: Standard and Diabetes    Subjective     Pt reports her pain is 4/10 during treatment session and she will also see wound care on tomorrow to address her surgical incision     Pt continues to ambulate into clinic with RW with excessive UE weightbearing  and gait deviations present. Pt was able to decreased UE WB while ambulation with quad cane. Pt encourage to use quad cane for ambulation to decrease UE use during ambulation.     History of current condition - Kamla reports: seeing surgeon whom removed bandages; open dermis distal incision noted; cleaned and band-aid placed advised cont warm water and running soap to keep cleaned. Feeling better since Monday when under the weather.  Patient and family support noted improved overall mobility, endurance, and strength with ADLs.    Prior Therapy: Yes  Social History: No stairs in home but does has 1 step to enter. lives with their family    Prior Level of Function: SPC for ambulation  Current Level of Function: RW    Pts goals: return to normal walking    Objective       Kamla received therapeutic exercises to develop strength, endurance, ROM and core stabilization for 40 minutes including:    Bike circumduction performed  "10-80deg 5min - not performed today  Quad Sets 5sec hold 3x10; LE ER log roll required to ensure LE neutral alignment w/o in-toeing-  LE hooklyng ABD Green TB 3x10  SLR 2 x 10  SAQ 2 x 10  Supine hip abduction 2 x 10 seated with GTB  Ball squeezes x 2 minutes seated  (B) Ankle pumps [id number]  Manual resistance - submaximal to all planes of motion to ankle - 10 reps (not performed today)   Heel slides supine through full available range of motion , 2 x 10 reps, verbal cuing for neutral LE alignment w/o in-toeing verbal cues for toe-ing out -  Supine TKE with 1/2 roll under knee - good quad activation noted  Supine hamstring stretch c/ strap 5 x 10"NP  Hamstring stretch prone prop with ice 5 min  Seated DF X 30 reps   Seated hamstring curls GTB X 30 reps       Gait stretch on Lvl4 20x5"  Standing hamstring curls 2 x 10  Steamboats 1 x 15  Heel raises   Mini Squats X30   Semi-tandem standing 3'   Step ups with LLE only X 30 reps on L4  Single leg stance with PT assistance 3X30"      Kamla received the following manual therapy techniques: Joint mobilizations and Soft tissue Mobilization were applied to the:(L) Knee for 10 minutes, including:  GI oscillation for TKE +AP 7x4  Patella mobilization in in all planes of motion X5 min   MFR to IT band       Kamla participated in dynamic functional therapeutic activities to improve functional performance for 0 minutes, including: STS training w/ gluteal EXT to prevent stooped posture and post LOB.  3 x 1 min with verbal cuing, SBA of PT (not performed today)     Kamla participated in gait training to improve functional mobility and safety for 5 minutes, including: AMB with standard quad cane verbal cuing for heel strike, and improved ecc. Control of the LLE during ambulation period      Cold pack to L knee for 5 minutes with therex     Home Exercises Provided and Patient Education Provided     Education provided: need for TKE w/ neutral LE w/o valgus/IR tendencies; ICE " +barrier & elevation at home    Written Home Exercises Provided: Patient instructed to cont prior HEP  Exercises were reviewed and Kamla was able to demonstrate them prior to the end of the session.  Kamla demonstrated good  understanding of the education provided.     See EMR under Media for exercises provided 11/25/2019.        Assessment       Pt present today with continued displays of gait deviations secondary to weight acceptance during ambulation and static standing period. Pt was therex was increased to perform an increase in standing and more dynamic therex during today's treatment session. Pt displayed a poor tolerance for standing therex secondary to 3 seated rest breaks while performing therex. Pt continues to display swelling of the LLE and poor quad recruitment while performing resistive therex and will benefit from skilled therapy.       Pt prognosis is Fair.   Pt will benefit from skilled outpatient Physical Therapy to address the deficits stated above and in the chart below, provide pt/family education, and to maximize pt's level of independence.     Plan of care discussed with patient: Yes  Pt's spiritual, cultural and educational needs considered and patient is agreeable to the plan of care and goals as stated below:     Anticipated Barriers for therapy: transportation      Goals:  Short Term Goals: 4 visits   1. Pt will be independent c/ HEP. 50% progress    Long Term Goals: 12 visits   1. Pt will improve LEFS to 75% limitation.  2. Pt will increase BLE to at least 4+/5 to improve functional mobility.  3.Pt will increase L knee ROM to 0-110 to improve functional mobility. 25% progress  4. Pt will be able to ambulate 150 feet c/ LRAD to improve independence c/ functional mobility 50% progress  5. Pt will decrease TUG time to less than or equal to 40 seconds.    Plan     Plan of care Certification: 1/14/2020 to 2/22/20 or 12 visits.    Outpatient Physical Therapy 3 times weekly for 6 weeks to  include the following interventions: Gait Training, Manual Therapy, Moist Heat/ Ice, Neuromuscular Re-ed, Patient Education, Self Care, Therapeutic Activites and Therapeutic Exercise, ASTYM, Kinesiotaping PRN, Functional Dry Needling    Jayme Da Silva, PT, DPT

## 2020-01-20 ENCOUNTER — OFFICE VISIT (OUTPATIENT)
Dept: ORTHOPEDICS | Facility: CLINIC | Age: 70
End: 2020-01-20
Payer: MEDICARE

## 2020-01-20 VITALS
SYSTOLIC BLOOD PRESSURE: 127 MMHG | WEIGHT: 216.06 LBS | DIASTOLIC BLOOD PRESSURE: 67 MMHG | HEIGHT: 61 IN | HEART RATE: 80 BPM | BODY MASS INDEX: 40.79 KG/M2

## 2020-01-20 DIAGNOSIS — Z96.652 STATUS POST LEFT KNEE REPLACEMENT: Primary | ICD-10-CM

## 2020-01-20 PROCEDURE — 99024 POSTOP FOLLOW-UP VISIT: CPT | Mod: S$GLB,,, | Performed by: NURSE PRACTITIONER

## 2020-01-20 PROCEDURE — 99999 PR PBB SHADOW E&M-EST. PATIENT-LVL III: ICD-10-PCS | Mod: PBBFAC,,, | Performed by: NURSE PRACTITIONER

## 2020-01-20 PROCEDURE — 99024 PR POST-OP FOLLOW-UP VISIT: ICD-10-PCS | Mod: S$GLB,,, | Performed by: NURSE PRACTITIONER

## 2020-01-20 PROCEDURE — 99999 PR PBB SHADOW E&M-EST. PATIENT-LVL III: CPT | Mod: PBBFAC,,, | Performed by: NURSE PRACTITIONER

## 2020-01-20 RX ORDER — HYDROCODONE BITARTRATE AND ACETAMINOPHEN 10; 325 MG/1; MG/1
1 TABLET ORAL EVERY 6 HOURS PRN
Qty: 28 TABLET | Refills: 0 | Status: SHIPPED | OUTPATIENT
Start: 2020-01-20 | End: 2020-01-30

## 2020-01-20 NOTE — PROGRESS NOTES
Pt returns for f/u of left shin wound. She has been using duoderm over the wound for the past few weeks. The wound has healed and she denies any pain at the site. She will use a bandaid to cover the wound after her daily shower. She will f/u with Dr. Saucedo in one month for her 3 month follow up appointment.

## 2020-01-21 ENCOUNTER — CLINICAL SUPPORT (OUTPATIENT)
Dept: REHABILITATION | Facility: HOSPITAL | Age: 70
End: 2020-01-21
Payer: MEDICARE

## 2020-01-21 DIAGNOSIS — M25.662 DECREASED RANGE OF MOTION OF BOTH KNEES: ICD-10-CM

## 2020-01-21 DIAGNOSIS — R29.898 WEAKNESS OF BOTH LOWER EXTREMITIES: ICD-10-CM

## 2020-01-21 DIAGNOSIS — M25.661 DECREASED RANGE OF MOTION OF BOTH KNEES: ICD-10-CM

## 2020-01-21 DIAGNOSIS — R26.9 ABNORMALITY OF GAIT: ICD-10-CM

## 2020-01-21 PROCEDURE — 97110 THERAPEUTIC EXERCISES: CPT | Mod: PO

## 2020-01-21 PROCEDURE — 97140 MANUAL THERAPY 1/> REGIONS: CPT | Mod: PO

## 2020-01-21 NOTE — PROGRESS NOTES
[facility]    Physical Therapy Daily Treatment Note     Name: Kamla Espinoza  Clinic Number: [id number]    Therapy Diagnosis:   Encounter Diagnoses   Name Primary?    Abnormality of gait     Decreased range of motion of both knees     Weakness of both lower extremities      Physician: Raffaele Saucedo MD    Physician Orders: PT Eval and Treat   Medical Diagnosis from Referral: M17.12 (ICD-10-CM) - Primary osteoarthritis of left knee  Surgical History:  Left TKA 11/19/19  Treatment Date: 1/21/2020  Authorization Period Expiration: 12/31/19  Plan of Care Expiration: 2/22/20 or 11 visits  Visit # / Visits authorized: 10/11        Time In: 11:05  Time Out: 12:00  Total Billable Time: 55 minutes     Precautions: Standard and Diabetes    Subjective     Pt reports her pain is 0/10 during treatment session and she will also see wound care on tomorrow to address her surgical incision     Pt ambulates into clinic with quad cane with excessive  gait deviations present.     Subjective: Pt reports continued tightness of the bilateral hamstrings with the inability to increase weightbearing during ambulation period.    Prior Therapy: Yes  Social History: No stairs in home but does has 1 step to enter. lives with their family    Prior Level of Function: RW  Current Level of Function: quad cane     Pts goals: return to normal walking    Objective       Kamla received therapeutic exercises to develop strength, endurance, ROM and core stabilization for 45 minutes including:    Bike circumduction performed 10-80deg 5min - not performed today  Quad Sets 5sec hold 3x10; LE ER log roll required to ensure LE neutral alignment w/o in-toeing-  LE hooklyng ABD Green TB 3x10  SLR 2 x 10  SAQ 2 x 10  Supine hip abduction 2 x 10 seated with GTB  Ball squeezes x 2 minutes seated  (B) Ankle pumps [id number]  Manual resistance - submaximal to all planes of motion to ankle - 10 reps (not performed today)   Heel  "slides supine through full available range of motion , 2 x 10 reps, verbal cuing for neutral LE alignment w/o in-toeing verbal cues for toe-ing out -  Supine TKE with 1/2 roll under knee - good quad activation noted  Supine hamstring stretch c/ strap 5 x 10"NP  Hamstring stretch prone prop with ice 5 min  Seated DF X 30 reps   Seated hamstring curls GTB X 30 reps       Gait stretch on Lvl4 20x5"  Standing hamstring curls 2 x 10  Steamboats 1 x 15  Heel raises   Mini Squats X30 On blue foam  Semi-tandem standing 3'   Step ups with LLE only X 30 reps on L4  Single leg stance with PT assistance 3X30"  On blue foam      Kamla received the following manual therapy techniques: Joint mobilizations and Soft tissue Mobilization were applied to the:(L) Knee for 10 minutes, including:  GI oscillation for TKE +AP 7x4  Patella mobilization in in all planes of motion X5 min   MFR to IT band       Kamla participated in dynamic functional therapeutic activities to improve functional performance for 0 minutes, including: STS training w/ gluteal EXT to prevent stooped posture and post LOB.  3 x 1 min with verbal cuing, SBA of PT (not performed today)     Kamla participated in gait training to improve functional mobility and safety for 5 minutes, including: AMB with standard quad cane verbal cuing for heel strike, and improved ecc. Control of the LLE during ambulation period      Cold pack to L knee for 5 minutes with therex     Home Exercises Provided and Patient Education Provided     Education provided: need for TKE w/ neutral LE w/o valgus/IR tendencies; ICE +barrier & elevation at home    Written Home Exercises Provided: Patient instructed to cont prior HEP  Exercises were reviewed and Kamla was able to demonstrate them prior to the end of the session.  Kamla demonstrated good  understanding of the education provided.     See EMR under Media for exercises provided 11/25/2019.        Assessment       Pt currently reports " to treatment session with reports of improved tolerance for ambulation with a decrease in assistive device (quad cane). Pt was able to perform full revolutions forward and reverse with only minor complaints of pain.  Pt continues to ambulate with decrease stance time and fore foot contact during ambulation period. Pt standing therex was increase during therex to improve LLE standing / tolerance for weight bearing. Pt continues to display decreased endurance with seated rest breaks required during standing therex. Pt will continue to benefit from skilled therapy.     Pt prognosis is Fair.   Pt will benefit from skilled outpatient Physical Therapy to address the deficits stated above and in the chart below, provide pt/family education, and to maximize pt's level of independence.     Plan of care discussed with patient: Yes  Pt's spiritual, cultural and educational needs considered and patient is agreeable to the plan of care and goals as stated below:     Anticipated Barriers for therapy: transportation      Goals:  Short Term Goals: 4 visits   1. Pt will be independent c/ HEP. 50% progress    Long Term Goals: 12 visits   1. Pt will improve LEFS to 75% limitation.  2. Pt will increase BLE to at least 4+/5 to improve functional mobility.  3.Pt will increase L knee ROM to 0-110 to improve functional mobility. 25% progress  4. Pt will be able to ambulate 150 feet c/ LRAD to improve independence c/ functional mobility 50% progress  5. Pt will decrease TUG time to less than or equal to 40 seconds.    Plan     Plan of care Certification: 1/21/2020 to 2/22/20 or 12 visits.    Outpatient Physical Therapy 3 times weekly for 6 weeks to include the following interventions: Gait Training, Manual Therapy, Moist Heat/ Ice, Neuromuscular Re-ed, Patient Education, Self Care, Therapeutic Activites and Therapeutic Exercise, ASTYM, Kinesiotaping PRN, Functional Dry Needling    Jayme Da Silva, PT, DPT

## 2020-01-23 ENCOUNTER — CLINICAL SUPPORT (OUTPATIENT)
Dept: REHABILITATION | Facility: HOSPITAL | Age: 70
End: 2020-01-23
Payer: MEDICARE

## 2020-01-23 DIAGNOSIS — R29.898 WEAKNESS OF BOTH LOWER EXTREMITIES: ICD-10-CM

## 2020-01-23 DIAGNOSIS — M25.662 DECREASED RANGE OF MOTION OF BOTH KNEES: ICD-10-CM

## 2020-01-23 DIAGNOSIS — R26.9 ABNORMALITY OF GAIT: ICD-10-CM

## 2020-01-23 DIAGNOSIS — M25.661 DECREASED RANGE OF MOTION OF BOTH KNEES: ICD-10-CM

## 2020-01-23 PROCEDURE — 97140 MANUAL THERAPY 1/> REGIONS: CPT | Mod: PO

## 2020-01-23 PROCEDURE — 97110 THERAPEUTIC EXERCISES: CPT | Mod: PO

## 2020-01-23 NOTE — PROGRESS NOTES
[facility] OUTPATIENT THERAPY AND WELLNESS    Physical Therapy Daily Treatment Note     Name: Kamla Espinoza  Clinic Number: [id number]    Therapy Diagnosis:   Encounter Diagnoses   Name Primary?    Abnormality of gait     Decreased range of motion of both knees     Weakness of both lower extremities      Physician: Raffaele Saucedo MD    Physician Orders: PT Eval and Treat   Medical Diagnosis from Referral: M17.12 (ICD-10-CM) - Primary osteoarthritis of left knee  Surgical History:  Left TKA 11/19/19  Treatment Date: 1/23/2020  Authorization Period Expiration: 12/31/19  Plan of Care Expiration: 2/22/20 or 11 visits  Visit # / Visits authorized: 11 total visits (4/20 visits)         Time In: 11:00  Time Out: 12:00  Total Billable Time: 60 minutes   1:1 treatment time - 30 min.     Precautions: Standard and Diabetes    Subjective     Pt reports: I am able to walk with my quad cane better however I continue to experience decreased tolerance for stance.     Pt ambulates into clinic with quad cane with excessive  gait deviations present.     Subjective: Pt reports continued tightness of the bilateral hamstrings with the inability to increase weightbearing during ambulation period.    Prior Therapy: Yes  Social History: No stairs in home but does has 1 step to enter. lives with their family    Prior Level of Function: RW  Current Level of Function: quad cane     Pts goals: return to normal walking    Objective       Kamla received therapeutic exercises to develop strength, endurance, ROM and core stabilization for 45 minutes including:    Bike circumduction performed 10-80deg 5min - not performed today  Quad Sets 5sec hold 3x10; LE ER log roll required to ensure LE neutral alignment w/o in-toeing-  LE hooklyng ABD Green TB 3x10  SLR 2 x 10  SAQ 2 x 10  Supine hip abduction 2 x 10 seated with GTB  Ball squeezes x 2 minutes seated  (B) Ankle pumps [id number]  Manual resistance - submaximal to all planes of motion to  "ankle - 10 reps (not performed today)   Heel slides supine through full available range of motion , 2 x 10 reps, verbal cuing for neutral LE alignment w/o in-toeing verbal cues for toe-ing out -  Supine TKE with 1/2 roll under knee - good quad activation noted  Supine hamstring stretch c/ strap 5 x 10"NP  Hamstring stretch prone prop with ice 5 min  Seated DF X 30 reps   Seated hamstring curls GTB X 30 reps         Gait stretch on Lvl4 20x5"  Standing hamstring curls 2 x 10  Steamboats 1 x 15  Heel raises   Mini Squats X30 On blue foam/ and chair for lower cueing   Semi-tandem standing 3'   Step ups with LLE only X 30 reps on L4  Single leg stance with PT assistance 3X30"  On blue foam      Kamla received the following manual therapy techniques: Joint mobilizations and Soft tissue Mobilization were applied to the:(L) Knee for 10 minutes, including:  GI oscillation for TKE +AP 7x4NP  Patella mobilization in in all planes of motion X5 min   MFR to IT band       Kamla participated in dynamic functional therapeutic activities to improve functional performance for 0 minutes, including: STS training w/ gluteal EXT to prevent stooped posture and post LOB.  3 x 1 min with verbal cuing, SBA of PT (not performed today)     Kamla participated in gait training to improve functional mobility and safety for 5 minutes, including: AMB with standard quad cane verbal cuing for heel strike, and improved ecc. Control of the LLE during ambulation period      Cold pack to L knee for 5 minutes with therex     Home Exercises Provided and Patient Education Provided     Education provided: need for TKE w/ neutral LE w/o valgus/IR tendencies; ICE +barrier & elevation at home    Written Home Exercises Provided: Patient instructed to cont prior HEP  Exercises were reviewed and Kamla was able to demonstrate them prior to the end of the session.  Kamla demonstrated good  understanding of the education provided.     See EMR under Media " for exercises provided 11/25/2019.        Assessment       Pt reported to treatment session with reports of continued decreased tolerance for ambulation. Pt currently display excessive UE usage during ambulation and single leg stance in static standing. Pt continues to ambulate with antalgic gait pattern secondary to decreased tolerance for single leg standing. Pt continues to display decreased tolerance for standing therex secondary to requested seated rest break. Pt will continue to benefit from skilled therapy.     Pt prognosis is Fair.   Pt will benefit from skilled outpatient Physical Therapy to address the deficits stated above and in the chart below, provide pt/family education, and to maximize pt's level of independence.     Plan of care discussed with patient: Yes  Pt's spiritual, cultural and educational needs considered and patient is agreeable to the plan of care and goals as stated below:     Anticipated Barriers for therapy: transportation      Goals:  Short Term Goals: 4 visits   1. Pt will be independent c/ HEP. 50% progress    Long Term Goals: 12 visits   1. Pt will improve LEFS to 75% limitation.  2. Pt will increase BLE to at least 4+/5 to improve functional mobility.  3.Pt will increase L knee ROM to 0-110 to improve functional mobility. 25% progress  4. Pt will be able to ambulate 150 feet c/ LRAD to improve independence c/ functional mobility 50% progress  5. Pt will decrease TUG time to less than or equal to 40 seconds.    Plan     Plan of care Certification: 1/23/2020 to 2/22/20 or 12 visits.    Outpatient Physical Therapy 3 times weekly for 6 weeks to include the following interventions: Gait Training, Manual Therapy, Moist Heat/ Ice, Neuromuscular Re-ed, Patient Education, Self Care, Therapeutic Activites and Therapeutic Exercise, ASTYM, Kinesiotaping PRN, Functional Dry Needling    Jayme Da Silva, PT, DPT

## 2020-01-30 ENCOUNTER — CLINICAL SUPPORT (OUTPATIENT)
Dept: REHABILITATION | Facility: HOSPITAL | Age: 70
End: 2020-01-30
Payer: MEDICARE

## 2020-01-30 DIAGNOSIS — R26.9 ABNORMALITY OF GAIT: ICD-10-CM

## 2020-01-30 DIAGNOSIS — R29.898 WEAKNESS OF BOTH LOWER EXTREMITIES: ICD-10-CM

## 2020-01-30 DIAGNOSIS — M25.661 DECREASED RANGE OF MOTION OF BOTH KNEES: ICD-10-CM

## 2020-01-30 DIAGNOSIS — M25.662 DECREASED RANGE OF MOTION OF BOTH KNEES: ICD-10-CM

## 2020-01-30 PROCEDURE — 97110 THERAPEUTIC EXERCISES: CPT | Mod: PO

## 2020-01-30 NOTE — PROGRESS NOTES
[facility]    Physical Therapy Daily Treatment Note     Name: Kamla Espinoza  Clinic Number: [id number]    Therapy Diagnosis:   Encounter Diagnoses   Name Primary?    Abnormality of gait     Decreased range of motion of both knees     Weakness of both lower extremities      Physician: Raffaele Saucedo MD    Physician Orders: PT Eval and Treat   Medical Diagnosis from Referral: M17.12 (ICD-10-CM) - Primary osteoarthritis of left knee  Surgical History:  Left TKA 11/19/19  Treatment Date: 1/30/2020  Authorization Period Expiration: 12/31/19  Plan of Care Expiration: 2/22/20 or 12 visits  Visit # / Visits authorized: 12 total visits (5/20 visits)         Time In: 11:00  Time Out: 12:00  Total Billable Time: 60 minutes   1:1 treatment time - 30 min.     Precautions: Standard and Diabetes    Subjective     Pt reports: Pt reported to treatment session with reports of non-compliance with HEP. Pt also expresses frustration with the transportation system and feels her lack of progression is partly due to her not attending therapy as frequently as she would like     Pain - 0/10  Pt ambulates into clinic with quad cane with excessive  gait deviations present.     Subjective: Pt reports continued tightness of the bilateral hamstrings with the inability to increase weightbearing during ambulation period.    Prior Therapy: Yes  Social History: No stairs in home but does has 1 step to enter. lives with their family    Prior Level of Function: RW  Current Level of Function: quad cane     Pts goals: return to normal walking    Objective       Kamla received therapeutic exercises to develop strength, endurance, ROM and core stabilization for 45 minutes including:    Bike circumduction performed 10-80deg 5min - not performed today  Quad Sets 5sec hold 3x10; LE ER log roll required to ensure LE neutral alignment w/o in-toeing-  LE hooklyng ABD Green TB 3x10  SLR 2 x 10  SAQ 2 x 10  Supine hip  "abduction 2 x 10 seated with GTB  Ball squeezes x 2 minutes seated  (B) Ankle pumps [id number]  Manual resistance - submaximal to all planes of motion to ankle - 10 reps (not performed today)   Heel slides supine through full available range of motion , 2 x 10 reps, verbal cuing for neutral LE alignment w/o in-toeing verbal cues for toe-ing out -  Supine TKE with 1/2 roll under knee - good quad activation noted  Supine hamstring stretch c/ strap 5 x 10"NP  Hamstring stretch prone prop with ice 5 min  Seated DF X 30 reps   Seated hamstring curls GTB X 30 reps         Gait stretch on Lvl4 20x5"  Standing hamstring curls 2 x 10  Steamboats 1 x 15 on LLE only   Heel raises   Mini Squats X30 On blue foam/ and chair for lower cueing   Semi-tandem standing 3' NP  Step ups with LLE only X 30 reps on L4 NP  Single leg stance with PT assistance 3X30"  On blue foam      Kamla received the following manual therapy techniques: Joint mobilizations and Soft tissue Mobilization were applied to the:(L) Knee for 10 minutes, including:  GI oscillation for TKE +AP 7x4NP  Patella mobilization in in all planes of motion X5 min   MFR to IT band       Kamla participated in dynamic functional therapeutic activities to improve functional performance for 0 minutes, including: STS training w/ gluteal EXT to prevent stooped posture and post LOB.  3 x 1 min with verbal cuing, SBA of PT (not performed today)     Kamla participated in gait training to improve functional mobility and safety for 5 minutes, including: AMB with standard quad cane verbal cuing for heel strike, and improved ecc. Control of the LLE during ambulation period      Cold pack to L knee for 5 minutes with therex     Home Exercises Provided and Patient Education Provided     Education provided: need for TKE w/ neutral LE w/o valgus/IR tendencies; ICE +barrier & elevation at home    Written Home Exercises Provided: Patient instructed to cont prior HEP  Exercises were reviewed " and Kamla was able to demonstrate them prior to the end of the session.  Kamla demonstrated good  understanding of the education provided.     See EMR under Media for exercises provided 11/25/2019.        Assessment     Pt continues to report to treatment session with continued display of decreased stance tolerance on LLE. Pt was able to improve static standing tolerance with a decrease of UE assistance however she is unable to perform full acceptance WB in static positioning. Pt displays trendelenburg gait and excessive trunk leaning during ambulation period. Pt will continue to benefit from skilled therapy to address deficits.      Pt prognosis is Fair.   Pt will benefit from skilled outpatient Physical Therapy to address the deficits stated above and in the chart below, provide pt/family education, and to maximize pt's level of independence.     Plan of care discussed with patient: Yes  Pt's spiritual, cultural and educational needs considered and patient is agreeable to the plan of care and goals as stated below:     Anticipated Barriers for therapy: transportation      Goals:  Short Term Goals: 4 visits   1. Pt will be independent c/ HEP. 50% progress    Long Term Goals: 12 visits   1. Pt will improve LEFS to 75% limitation.  2. Pt will increase BLE to at least 4+/5 to improve functional mobility.  3.Pt will increase L knee ROM to 0-110 to improve functional mobility. 25% progress  4. Pt will be able to ambulate 150 feet c/ LRAD to improve independence c/ functional mobility 50% progress  5. Pt will decrease TUG time to less than or equal to 40 seconds.    Plan     Plan of care Certification: 1/30/2020 to 2/22/20 or 12 visits.    Outpatient Physical Therapy 3 times weekly for 6 weeks to include the following interventions: Gait Training, Manual Therapy, Moist Heat/ Ice, Neuromuscular Re-ed, Patient Education, Self Care, Therapeutic Activites and Therapeutic Exercise, ASTYM, Kinesiotaping PRN, Functional Dry  Nichelle Da Silva, PT, DPT

## 2020-02-03 ENCOUNTER — OFFICE VISIT (OUTPATIENT)
Dept: BARIATRICS | Facility: CLINIC | Age: 70
End: 2020-02-03
Payer: MEDICARE

## 2020-02-03 VITALS
HEART RATE: 89 BPM | HEIGHT: 61 IN | SYSTOLIC BLOOD PRESSURE: 118 MMHG | WEIGHT: 208.13 LBS | BODY MASS INDEX: 39.3 KG/M2 | DIASTOLIC BLOOD PRESSURE: 50 MMHG

## 2020-02-03 DIAGNOSIS — E66.01 CLASS 2 SEVERE OBESITY WITH BODY MASS INDEX (BMI) OF 35 TO 39.9 WITH SERIOUS COMORBIDITY: Primary | ICD-10-CM

## 2020-02-03 PROCEDURE — 99999 PR PBB SHADOW E&M-EST. PATIENT-LVL III: ICD-10-PCS | Mod: PBBFAC,,, | Performed by: INTERNAL MEDICINE

## 2020-02-03 PROCEDURE — 1101F PR PT FALLS ASSESS DOC 0-1 FALLS W/OUT INJ PAST YR: ICD-10-PCS | Mod: CPTII,S$GLB,, | Performed by: INTERNAL MEDICINE

## 2020-02-03 PROCEDURE — 1159F PR MEDICATION LIST DOCUMENTED IN MEDICAL RECORD: ICD-10-PCS | Mod: S$GLB,,, | Performed by: INTERNAL MEDICINE

## 2020-02-03 PROCEDURE — 1126F PR PAIN SEVERITY QUANTIFIED, NO PAIN PRESENT: ICD-10-PCS | Mod: S$GLB,,, | Performed by: INTERNAL MEDICINE

## 2020-02-03 PROCEDURE — 3078F DIAST BP <80 MM HG: CPT | Mod: CPTII,S$GLB,, | Performed by: INTERNAL MEDICINE

## 2020-02-03 PROCEDURE — 1159F MED LIST DOCD IN RCRD: CPT | Mod: S$GLB,,, | Performed by: INTERNAL MEDICINE

## 2020-02-03 PROCEDURE — 99213 PR OFFICE/OUTPT VISIT, EST, LEVL III, 20-29 MIN: ICD-10-PCS | Mod: S$GLB,,, | Performed by: INTERNAL MEDICINE

## 2020-02-03 PROCEDURE — 3074F SYST BP LT 130 MM HG: CPT | Mod: CPTII,S$GLB,, | Performed by: INTERNAL MEDICINE

## 2020-02-03 PROCEDURE — 99999 PR PBB SHADOW E&M-EST. PATIENT-LVL III: CPT | Mod: PBBFAC,,, | Performed by: INTERNAL MEDICINE

## 2020-02-03 PROCEDURE — 1101F PT FALLS ASSESS-DOCD LE1/YR: CPT | Mod: CPTII,S$GLB,, | Performed by: INTERNAL MEDICINE

## 2020-02-03 PROCEDURE — 1126F AMNT PAIN NOTED NONE PRSNT: CPT | Mod: S$GLB,,, | Performed by: INTERNAL MEDICINE

## 2020-02-03 PROCEDURE — 99213 OFFICE O/P EST LOW 20 MIN: CPT | Mod: S$GLB,,, | Performed by: INTERNAL MEDICINE

## 2020-02-03 PROCEDURE — 3078F PR MOST RECENT DIASTOLIC BLOOD PRESSURE < 80 MM HG: ICD-10-PCS | Mod: CPTII,S$GLB,, | Performed by: INTERNAL MEDICINE

## 2020-02-03 PROCEDURE — 3074F PR MOST RECENT SYSTOLIC BLOOD PRESSURE < 130 MM HG: ICD-10-PCS | Mod: CPTII,S$GLB,, | Performed by: INTERNAL MEDICINE

## 2020-02-03 RX ORDER — TOPIRAMATE 50 MG/1
50 TABLET, FILM COATED ORAL 2 TIMES DAILY
Qty: 60 TABLET | Refills: 3 | Status: SHIPPED | OUTPATIENT
Start: 2020-02-03 | End: 2020-04-23 | Stop reason: SDUPTHER

## 2020-02-03 NOTE — PATIENT INSTRUCTIONS
Patient was informed that topiramate is used for migraine prevention and seizures. Weight loss is a common side effect that is well documented. S/he understands this. S/he was informed of the potential side effects such as serious and possibly fatal rash in which case the medication should be discontinued immediately. Paresthesias, forgetfulness, fatigue, kidney stones, GI symptoms, and changes in lab values such as electrolytes, blood counts and kidney function.      Continue topiramate to 50 mg BID.     Increase activity per PT    Conitue 800-1000 calories a day.     Avoid/limit starchy carbohydrates (bread, rice, pasta, potatoes, corn, peas, oatmeal, grits, tortillas, crackers, chips)        Dinner in Under 30 minutes and 400 calories.     Baked Chicken breast with Broccoli Cheese Casserole  2-3 chicken breast (approximately 6-8 oz each)    2 tsp each garlic and herb Mrs. Yassine seasoning   2 tsp your favorite creole seasoning  2 tablespoons of balsamic vinegar  2 - 10 oz packages of frozen broccoli  1 egg   ½ cup skim milk  ½ tsp paprika   ½ tsp cayenne pepper   1 can fat free cream of mushroom soup.   1 .5 cups of shredded cheddar cheese    Pre-heat oven to 450 degrees. Toss 2-3 chicken breast (approximately 6-8 oz each) in 2 tsp each garlic and herb Mrs. Dash seasoning, 2 tsp your favorite creole seasoning, and 2 tablespoons of balsamic vinegar. This can also be done up to 24 hours ahead of time, and the chicken can be left in the refrigerator to marinate. Place on a baking sheet sprayed with non-stick cooking spray and bake on 450 degrees for 10 min, then reduce heat to 350 degrees and cook an addition 10-15 minutes until chicken is cooked through.   While chicken is baking, microwave safe dish, thaw 2 - 10 oz packages of frozen broccoli. Drain any excess water, season with salt, pepper, all-purpose seasoning of your choice. In another bowl, whisk together 1 egg, ½ cup skim milk, ½ tsp paprika, ½ tsp cayenne  pepper and 1 can fat free cream of mushroom soup. Combine broccoli, soup mixture, 1 cup of shredded cheddar cheese in baking dish sprayed with cooking spray. Top with remaining cheese. Bake in the oven with chicken for about 20 min or until set cheese is melted and ogden.     Makes 4 servings. 389 calories per serving.10 g fat, 13 g carbs, 54g protein     Sheet Pan [place] Shrimp  1 pound large shrimp, shelled, peeled and deveined.   2 tablespoon olive oil  The zest and the juice of 1 lime  ½ tsp chili powder  ½-1 tsp cayenne pepper  1 tsp cumin  ½ tsp dry oregano  1 red bell pepper  1 green bell pepper  1 red onion  2 cups mushrooms, quartered  1 avocado  Whole tammi lettuce leaves  Preheat oven to 375 degrees.  In a bowl combine shrimp, lime juice, lime zest, cumin, cayenne pepper, chili powder, and a pinch of salt. Set aside.   Slice peppers and onions into thin strips. Toss in 1 tablespoon of olive oil. Sprinkle with salt and pepper and arrange in a single layer over 1/3 of a large sheet pan  Toss mushrooms with remaining olive oil, oregano, salt and pepper. Arrange in single layer on sheet pan. Place sheet pan in oven for about 15-20 minutes until vegetables are softened, cooked about ¾ of the way through. Remove the sheet pan from oven.  Change setting on oven to low broil.  If needed, rearrange vegetables to make room for shrimp. Arrange the shrimp in a single layer on the sheet pan and return pan to oven. After 5 minutes, flip shrimp. Cook 3-5 additional minutes, or until  Shrimp are opaque.   Serve shrimp and cooked vegetables on lettuce leaves with sliced avocado.   Makes 4 servings. Calories per servin; 23g fat, 19 g carbohydrates, 27g protein.    Zoodles Primavera with Seasoned Ricotta  8 cups zucchini noodles. These can be purchased already prepared, or you can make them on your own with whole zucchini and a vegetable spiralizer.   1.5 cups cherry tomatoes, quartered  2 cups sliced  mushrooms  1 cup chopped fresh broccoli  1 cup frozen peas and carrots  2 cloves garlic, finely chopped  16 oz part skim ricotta cheese  2 oz Neufchatel cream cream cheese, cut into small cubes  Juice and zest of 1 lemon  1 pinch red pepper flakes    2 tsp Italian seasoning  4-5 leaves fresh basil, thinly sliced  1 tablespoon of olive oil  Parmesan cheese for topping (optional)     In a bowl, combine ricotta cheese, 1 tsp Italian seasoning, ½ teaspoon lemon zest. Season with salt and pepper to taste. Mix well. Fold in half of fresh basil. Set aside.   Heat a large skillet to medium high. Add olive oil. Sautee mushrooms until starting to become tender. Season them with salt and pepper while cooking.  Add broccoli and peas and carrots. Reduce heat to medium. Cover pan and cook for 4-5 minutes until broccoli is tender and peas and carrots are warmed through. Add Neufchatel cheese, Italian seasoning, red pepper flakes, 1 tsp lemon zest and lemon juice. Stir until a smooth sauce is formed. Add zucchini noodles (zoodles) to skillet and toss in sauce, then add cherry tomatoes.  Separate zoodle and vegetables into 4 equal portions. Serve each with a ¼ cup serving of seasoned ricotta cheese. Sprinkle with grated parmesan cheese or fresh basil,  if desired.   Makes 4 servings. Calories per servin calories, 32 g carbs, 33 g protein, 18 g fat

## 2020-02-03 NOTE — PROGRESS NOTES
"Subjective:       Patient ID: Kamla Espinoza is a 69 y.o. female.    Chief Complaint: Follow-up    Pt here today for follow-up. Has lost 29 lbs, net neg 40 lbs. Started 800-100 tayler diet and topiramate, increased at last OV. . States she has been eating more vegetables and less starches. She denies SE. Does feel appetite is down. Doing PT 1- 2 times a week. She does she is getting more mobility.       Follow-up   Associated symptoms include arthralgias. Pertinent negatives include no chest pain, chills or fever.     Review of Systems   Constitutional: Negative for chills and fever.   Respiratory: Negative for shortness of breath.         + snores   Cardiovascular: Positive for leg swelling. Negative for chest pain.   Gastrointestinal: Negative for constipation and diarrhea.        + gerd   Genitourinary: Positive for frequency. Negative for difficulty urinating and dysuria.   Musculoskeletal: Positive for arthralgias and back pain.   Neurological: Negative for dizziness and light-headedness.   Psychiatric/Behavioral: Negative for dysphoric mood. The patient is not nervous/anxious.        Objective:     BP (!) 118/50   Pulse 89   Ht 5' 1" (1.549 m)   Wt 94.4 kg (208 lb 1.8 oz)   BMI 39.32 kg/m²    Physical Exam   Constitutional: She is oriented to person, place, and time. She appears well-developed. No distress.   Morbidly obese     HENT:   Head: Normocephalic and atraumatic.   Eyes: Pupils are equal, round, and reactive to light. EOM are normal. No scleral icterus.   Neck: Normal range of motion. Neck supple. No thyromegaly present.   Cardiovascular: Normal rate.   Pulmonary/Chest: Effort normal.   Musculoskeletal: Normal range of motion. She exhibits no edema.   Neurological: She is alert and oriented to person, place, and time. No cranial nerve deficit.   Skin: Skin is warm and dry. No erythema.   Psychiatric: She has a normal mood and affect. Her behavior is normal. Judgment normal.   Vitals reviewed.    "   Assessment:       1. Class 2 severe obesity with body mass index (BMI) of 35 to 39.9 with serious comorbidity        Plan:         Kamla was seen today for follow-up.    Diagnoses and all orders for this visit:    Class 2 severe obesity with body mass index (BMI) of 35 to 39.9 with serious comorbidity    Other orders  -     topiramate (TOPAMAX) 50 MG tablet; Take 1 tablet (50 mg total) by mouth 2 (two) times daily.      Patient was informed that topiramate is used for migraine prevention and seizures. Weight loss is a common side effect that is well documented. S/he understands this. S/he was informed of the potential side effects such as serious and possibly fatal rash in which case the medication should be discontinued immediately. Paresthesias, forgetfulness, fatigue, kidney stones, GI symptoms, and changes in lab values such as electrolytes, blood counts and kidney function.      Continue topiramate to 50 mg BID.     Sit and Be Fit exercise program on [employer], [employer] or [employer] 3-4 times a week this month     30 min recipes given.

## 2020-03-04 ENCOUNTER — OFFICE VISIT (OUTPATIENT)
Dept: ORTHOPEDICS | Facility: CLINIC | Age: 70
End: 2020-03-04
Payer: MEDICARE

## 2020-03-04 VITALS — WEIGHT: 200.5 LBS | BODY MASS INDEX: 37.86 KG/M2 | HEIGHT: 61 IN

## 2020-03-04 DIAGNOSIS — Z96.652 STATUS POST LEFT KNEE REPLACEMENT: Primary | ICD-10-CM

## 2020-03-04 PROCEDURE — 99999 PR PBB SHADOW E&M-EST. PATIENT-LVL III: ICD-10-PCS | Mod: PBBFAC,,, | Performed by: ORTHOPAEDIC SURGERY

## 2020-03-04 PROCEDURE — 99999 PR PBB SHADOW E&M-EST. PATIENT-LVL III: CPT | Mod: PBBFAC,,, | Performed by: ORTHOPAEDIC SURGERY

## 2020-03-04 PROCEDURE — 99024 PR POST-OP FOLLOW-UP VISIT: ICD-10-PCS | Mod: S$GLB,,, | Performed by: ORTHOPAEDIC SURGERY

## 2020-03-04 PROCEDURE — 99024 POSTOP FOLLOW-UP VISIT: CPT | Mod: S$GLB,,, | Performed by: ORTHOPAEDIC SURGERY

## 2020-03-04 RX ORDER — DICLOFENAC SODIUM 10 MG/G
2 GEL TOPICAL DAILY
Qty: 1 TUBE | Refills: 3 | Status: SHIPPED | OUTPATIENT
Start: 2020-03-04 | End: 2021-02-08

## 2020-03-04 NOTE — PROGRESS NOTES
Kamla Espinoza is in for 3 month follow up for a  Left TKA.  She is doing fairly well.  Mild pain in the knee.   She has stopped PT due to the transportation costs  Exam demonstrates  A well developed female in no distress.  Alert and oriented.  Mood and affect are appropriate.    Knee incision is well healed.  ROM is 0-115.  The patella tracks well and there is no instability. The extremity is neurovascularly intact.    Xrays demonstrate a well fixed and positioned prosthesis.      Imp:Progressing    Voltaren gel.    F/u in 3 months with xrays. Sooner if needed

## 2020-03-26 ENCOUNTER — DOCUMENTATION ONLY (OUTPATIENT)
Dept: REHABILITATION | Facility: HOSPITAL | Age: 70
End: 2020-03-26

## 2020-03-26 PROBLEM — M25.662 DECREASED RANGE OF MOTION OF BOTH KNEES: Status: RESOLVED | Noted: 2019-11-22 | Resolved: 2020-03-26

## 2020-03-26 PROBLEM — R26.9 ABNORMALITY OF GAIT: Status: RESOLVED | Noted: 2019-11-27 | Resolved: 2020-03-26

## 2020-03-26 PROBLEM — R29.898 WEAKNESS OF BOTH LOWER EXTREMITIES: Status: RESOLVED | Noted: 2019-11-22 | Resolved: 2020-03-26

## 2020-03-26 PROBLEM — M25.661 DECREASED RANGE OF MOTION OF BOTH KNEES: Status: RESOLVED | Noted: 2019-11-22 | Resolved: 2020-03-26

## 2020-03-26 NOTE — PROGRESS NOTES
[facility]  Physical Therapy Discharge Summary     Name: Kamla Espinoza  Clinic Number: [id number]    Therapy Diagnosis:        Encounter Diagnoses   Name Primary?    Abnormality of gait      Decreased range of motion of both knees      Weakness of both lower extremities        Physician: Raffaele Saucedo MD     Physician Orders: PT Eval and Treat   Medical Diagnosis from Referral: M17.12 (ICD-10-CM) - Primary osteoarthritis of left knee  Surgical History:  Left TKA 11/19/19  Treatment Date: 1/30/2020  Authorization Period Expiration: 12/31/19  Plan of Care Expiration: 2/22/20 or 12 visits        Initial visit: 8/28/19  Date of Last visit: 1/30/20  Date of Discharge:  1/30/20  Total Visits Received: 21  Total Missed Visits: 6 12/30/19-1/18/20  ASSESSMENT   Goal Status:  Short Term Goals: 4 visits   1. Pt will be independent c/ HEP. 50% progress     Long Term Goals: 12 visits   1. Pt will improve LEFS to 75% limitation.  2. Pt will increase BLE to at least 4+/5 to improve functional mobility.  3.Pt will increase L knee ROM to 0-110 to improve functional mobility. 25% progress  4. Pt will be able to ambulate 150 feet c/ LRAD to improve independence c/ functional mobility 50% progress  5. Pt will decrease TUG time to less than or equal to 40 seconds.    Goals Not achieved and why: LTG -5   Discharge plan :Continue HEP and Pt to follow-up with MD as planned  Discharge reason : Patient self discharge secondary to transportation difficulty       PLAN     This patient is discharged from Physical Therapy Services.     Jayme Da Silva, PT,DPT

## 2020-04-15 ENCOUNTER — TELEPHONE (OUTPATIENT)
Dept: BARIATRICS | Facility: CLINIC | Age: 70
End: 2020-04-15

## 2020-04-15 NOTE — TELEPHONE ENCOUNTER
Spoke with pt. In regards to converting appointment into a tele visit. Notice portal said pending, was able to instruct pt. On how to get portal working. Also, scheduled 3 month follow up.

## 2020-04-23 ENCOUNTER — OFFICE VISIT (OUTPATIENT)
Dept: BARIATRICS | Facility: CLINIC | Age: 70
End: 2020-04-23
Payer: MEDICARE

## 2020-04-23 DIAGNOSIS — E66.01 CLASS 2 SEVERE OBESITY WITH BODY MASS INDEX (BMI) OF 35 TO 39.9 WITH SERIOUS COMORBIDITY: Primary | ICD-10-CM

## 2020-04-23 PROCEDURE — 1159F PR MEDICATION LIST DOCUMENTED IN MEDICAL RECORD: ICD-10-PCS | Mod: 95,,, | Performed by: INTERNAL MEDICINE

## 2020-04-23 PROCEDURE — 1101F PR PT FALLS ASSESS DOC 0-1 FALLS W/OUT INJ PAST YR: ICD-10-PCS | Mod: CPTII,95,, | Performed by: INTERNAL MEDICINE

## 2020-04-23 PROCEDURE — 1101F PT FALLS ASSESS-DOCD LE1/YR: CPT | Mod: CPTII,95,, | Performed by: INTERNAL MEDICINE

## 2020-04-23 PROCEDURE — 99213 PR OFFICE/OUTPT VISIT, EST, LEVL III, 20-29 MIN: ICD-10-PCS | Mod: 95,,, | Performed by: INTERNAL MEDICINE

## 2020-04-23 PROCEDURE — 1159F MED LIST DOCD IN RCRD: CPT | Mod: 95,,, | Performed by: INTERNAL MEDICINE

## 2020-04-23 PROCEDURE — 99213 OFFICE O/P EST LOW 20 MIN: CPT | Mod: 95,,, | Performed by: INTERNAL MEDICINE

## 2020-04-23 RX ORDER — TOPIRAMATE 50 MG/1
50 TABLET, FILM COATED ORAL 2 TIMES DAILY
Qty: 180 TABLET | Refills: 1 | Status: SHIPPED | OUTPATIENT
Start: 2020-04-23 | End: 2020-07-27 | Stop reason: SDUPTHER

## 2020-04-23 NOTE — PATIENT INSTRUCTIONS
Patient was informed that topiramate is used for migraine prevention and seizures. Weight loss is a common side effect that is well documented. S/he understands this. S/he was informed of the potential side effects such as serious and possibly fatal rash in which case the medication should be discontinued immediately. Paresthesias, forgetfulness, fatigue, kidney stones, GI symptoms, and changes in lab values such as electrolytes, blood counts and kidney function.      Continue topiramate to 50 mg BID.     Sit and Be Fit exercise program on [employer], [employer] or [employer] 3-4 times a week this month       [facility] Survival Guide  Tips to Stay on Track During COVID-19      DO DON'T   Keep up with your food log  Maintaining your caloric intake and diet quality is critical for achieving your health and weight loss goals.  Download the [employer] and use [facility] Code 98045 to track food and fluid intake Feel Discouraged - You can do this!  There are a lot of changes happening in our world but don't let them discourage you. Focus on the future and remind yourself of all the work and effort you've put in so far.     Keep protein-rich foods stocked up  buy chicken and turkey in bulk and freeze them, keep dry or canned beans on hand, get the largest quantity of eggs available. Make sure you are getting your required protein intake (between  grams EACH DAY).   Drink fluid during meals or 30 minutes before/after eating  Your regular routine may have changed which may have caused some of your meal or snack times to change.  keep track of when you consume any liquid and time your meals accordingly. This will also help you make sure you are staying hydrated throughout the day   Continue with your protein drinks or bars  Order online or use grocery delivery service. Always make sure you order more WELL BEFORE you are running low, especially now when deliveries may take longer to arrive.  Remember to  check to make sure your protein shakes or bars have 4 gms of sugar or less.     Keep table sugar around  You may be more tempted to add it to your drinks or food if it is visible and easily accessible.   Try new recipes  Use this time to experiment in the kitchen and find some different healthy dishes you enjoy - you can use the nutrition booklet to help guide you.  If you cannot find your copy, please download it from our website @ [email or web address]  Click here to download [facility]'s Nutrition Binder.   Add tough or crunchy foods back into your diet too quickly  Raw veggies are great snack foods but adding them in too quickly after surgery will cause pain and discomfort   Eat your meals slowly and intentionally  You shouldn't have to rush out to be anywhere so really take your time and aim for each meal to last around 20-30 minutes.   Drink sugary/bubbly drinks or alcohol    It may be tempting especially if you are surrounded by others without these limitations, but these beverages will prevent weight loss and cause gastric  pain/discomfort     Listen to your body - try to recognize when you feel full.  Learning your body's signals can be difficult but it is a key step in your weight loss journey. While you are is this process of working on this step, be sure portion out the recommended quantities of foods and meals/snacks to prevent overeating.   Eat your meals using electronics  This is especially difficult at home where your use of computers, phones, and TVs are pretty much unregulated. Designate a meal spot or spots where there is limited distractions and you can focus on your food - maybe your kitchen table or on an outside porch or deck.   Continue taking vitamins and minerals  Take them at the same time each day and keep a log of when you take your supplements to make sure you don't miss any. These supplements are essential for preventing  malnutrition and other health problems that will deter your progress.   'Save' your appetite   You may feel like holding out from food as long as possible so you can eat a large meal later on, but your body needs energy throughout the day in order to properly fuel itself and keep you alert.  Try eating small meals throughout the day - use these meals as mini breaks from work or projects you are doing at home.   Stay active!  It is so important for both your physical and mental health that you get regular physical activity. Even if you can no longer physically go to the gym or to workout classes there are tons of online resources to keep you moving. Incorporate a specific exercise time into your daily home routine and keep a journal of your activity.   Order food delivery or take out   Most places are now offering delivery services, but it can still be difficult to find and choose healthy options. Choose to do a grocery store  or delivery instead- cooking food at home is less expensive then eating out!   Review the resources you've been provided and keep in touch with your healthcare team.  Let them know if you are struggling or experiencing any problems - they are here to help!  Call us to schedule a telehealth visit at [phone] Isolate yourself   It may be called 'social distancing' but that does not mean we can't still connect with one another. Phone calls or group video chats are great ways to keep in touch while staying at home. Any method of getting regular social time with friends and family will help to remind you that you're not in this alone.     Grocery List    Items to Keep Stocked in Your Kitchen  PROTEINS (Lean)    Eggs  Beans (canned and/or dried)  Skinless chicken/turkey   Tuna/[place] (canned and/or pouch)  Tofu or Tempeh  Aaliyah Veggie Burgers  Fish or shrimp (fresh or frozen)  Ground Beef (90% lean)  Steaks  Chobani Greek Yogurt  Cabot Cottage Cheese  Hummus  Low-fat cheeses  (Laughing Cow, Baby Kearns, mozzarella string cheese)  Fairlife Non-fat Milk    Vegetables (non-starchy)  *veggies can be fresh or frozen    Broccoli   Cauliflower   Carrots   Onions   Cabbage   Radishes   Zucchini   Okra   Greens   Peppers   Spinach   Turnips   Mushrooms   Tomatoes   Celery   Lettuce   Asparagus  Eggplant   Green Onions   Kale    Fruits  *Fruits can be fresh or frozen    Apples  Oranges  Pears  Kiwi  Melon  Berries  Peaches  Unsweetened Applesauce    *Avoid fruits canned In syrup  *Stick to 1-2 servings of fruit/day   Vitamins    Flintstones Complete  Chewables    Super B-Complex tablets with 50 mg Thiamine    Nature's Way liquid Calcium Citrate + Vit D     Sublingual Vitamin B12   Other    Sugar-free Popsicles    Sugar-free Jello    Crystal Lite    Low Fat Condiments     Decaf Coffee/Tea           Foods to Avoid Having Around the House  Butter/Margarine Cookies Candy Chips  Pretzels Grits  Granola Popcorn Paredes Corn  Bread Alcohol Soda  Pasta  Rice  Cake/Pie Sausage Potatoes Ice Cream                 Tricks to Prevent Emotional Eating During Quarantine      Keep 'trigger foods' out of the house   Keep yourself distracted with work, games, music, or whatever hobby you enjoy. This may be the time to try a new activity!   Try fighting stress with breathing techniques, yoga, meditation, or prayer   Mix up your meals with a variety of dishes   Keep up with your food diary   Call or video chat with a friend or family   Plan your meals for specific time and try for smaller meals throughout the day   Pre-portion all meals and snacks    Step outside for some fresh air or do a quick exercise activity to reset yourself    *Avoid negative thoughts about yourself - if you have a slip-up, you are not a failure. Forgive yourself and focus on learning from it so you can prevent it for the future              Ways to Stay Active While Staying Inside      Tap [employer]n Tapube Videos - free exercise and gym classes at  any fitness level   Apps -tons of fitness apps are currently offering free trial periods and offer workouts that don't require equipment   Chores around the house, such as cleaning or gardening   Walk up and down the stairs   Video-chat with your regular workout marco and do an online class together   Make a playlist of your favorite fun songs and dance around - no need to worry about knowing any serious dance moves, just jump around get your heart rate up!    While you are limited to working out at home, you may find it easier to do short, mini workouts multiple times a day instead of all at once. Try to still get at least 30 minutes of physical activity in each day!   Physical Health = Mental Health    The health of both your mind and body are equally important -be sure you are taking the time to care for both. It is likely that the current health concerns and quarantine mandates caused significant changes to your normal routine. Although this can feel overwhelming and seem difficult to manage, there are ways you can take to manage these feelings and keep your mental and physical health journey on track. Here are some tips for self-care during quarantine:     Meditate, take deep breaths - find any practice that will help you center yourself.   Move around your house throughout the day. Avoid staying in the same seat or room for too long and try to work in an area of your house that get lots of sunlight if possible.   Get fresh air for a bit every day - being outside is a great way to improve your mood! You don't necessarily have to go far from your house. You could even just hang out on your porch for a while or take a walk around the block.    Get good sleep and maintain a regular sleep schedule   Connect with others. While we aren't able to physically be with others right now it is still so important to socialize and interact with other people, even if it is being done remotely.    Keep yourself busy.  Make a list of tasks that you want to complete around the house, start a new book, do some art projects, try journaling.

## 2020-04-23 NOTE — PROGRESS NOTES
Subjective:       Patient ID: Kamla Espinoza is a 69 y.o. female.    Chief Complaint: Follow-up    The patient location is: [place], [place]. Home  The chief complaint leading to consultation is: Patient presents with:  Follow-up     Visit type: audiovisual  Total time spent with patient: 13 min  Each patient to whom he or she provides medical services by telemedicine is:  (1) informed of the relationship between the physician and patient and the respective role of any other health care provider with respect to management of the patient; and (2) notified that he or she may decline to receive medical services by telemedicine and may withdraw from such care at any time.    Notes:       Pt here today for follow-up. Has lost 16 lbs, net neg 56 lbs. Started 800-1000 tayler diet and topiramate, increased  50 mg. . States she has been eating more vegetables and less starches. She denies SE. Does feel appetite is down.  She does she is getting more mobility. Has been walking around inside. Her PT was put on hold, as she could not make her appt. Her daughter has been doing grocery shopping.     Prev 208#. Current home weight 192#    Follow-up   Associated symptoms include arthralgias. Pertinent negatives include no chest pain, chills or fever.     Review of Systems   Constitutional: Negative for chills and fever.   Respiratory: Negative for shortness of breath.         + snores   Cardiovascular: Positive for leg swelling. Negative for chest pain.   Gastrointestinal: Negative for constipation and diarrhea.        + gerd   Genitourinary: Positive for frequency. Negative for difficulty urinating and dysuria.   Musculoskeletal: Positive for arthralgias and back pain.   Neurological: Negative for dizziness and light-headedness.   Psychiatric/Behavioral: Negative for dysphoric mood. The patient is not nervous/anxious.        Objective:     There were no vitals taken for this visit.   Physical Exam   Constitutional: She is oriented  to person, place, and time. She appears well-developed. No distress.   Morbidly obese     HENT:   Head: Normocephalic and atraumatic.   Pulmonary/Chest: Effort normal.   Neurological: She is alert and oriented to person, place, and time.   Psychiatric: She has a normal mood and affect. Her behavior is normal. Judgment normal.   Vitals reviewed.      Assessment:       1. Class 2 severe obesity with body mass index (BMI) of 35 to 39.9 with serious comorbidity        Plan:         Kamla was seen today for follow-up.    Diagnoses and all orders for this visit:    Class 2 severe obesity with body mass index (BMI) of 35 to 39.9 with serious comorbidity    Other orders  -     topiramate (TOPAMAX) 50 MG tablet; Take 1 tablet (50 mg total) by mouth 2 (two) times daily.      Patient was informed that topiramate is used for migraine prevention and seizures. Weight loss is a common side effect that is well documented. S/he understands this. S/he was informed of the potential side effects such as serious and possibly fatal rash in which case the medication should be discontinued immediately. Paresthesias, forgetfulness, fatigue, kidney stones, GI symptoms, and changes in lab values such as electrolytes, blood counts and kidney function.      Continue topiramate to 50 mg BID.     Sit and Be Fit exercise program on [employer], [employer] or [facility] 3-4 times a week this month     30 min recipes given.

## 2020-05-27 DIAGNOSIS — M25.562 LEFT KNEE PAIN, UNSPECIFIED CHRONICITY: Primary | ICD-10-CM

## 2020-06-08 ENCOUNTER — TELEPHONE (OUTPATIENT)
Dept: ORTHOPEDICS | Facility: CLINIC | Age: 70
End: 2020-06-08

## 2020-06-08 ENCOUNTER — HOSPITAL ENCOUNTER (OUTPATIENT)
Dept: RADIOLOGY | Facility: HOSPITAL | Age: 70
Discharge: HOME | End: 2020-06-08
Attending: ORTHOPAEDIC SURGERY
Payer: MEDICARE

## 2020-06-08 DIAGNOSIS — M25.561 PAIN IN BOTH KNEES, UNSPECIFIED CHRONICITY: ICD-10-CM

## 2020-06-08 DIAGNOSIS — M25.562 PAIN IN BOTH KNEES, UNSPECIFIED CHRONICITY: ICD-10-CM

## 2020-06-08 DIAGNOSIS — M25.561 PAIN IN BOTH KNEES, UNSPECIFIED CHRONICITY: Primary | ICD-10-CM

## 2020-06-08 DIAGNOSIS — M25.562 PAIN IN BOTH KNEES, UNSPECIFIED CHRONICITY: Primary | ICD-10-CM

## 2020-06-08 NOTE — TELEPHONE ENCOUNTER
I tried calling pt. To let her know that I rescheduled her appt. LV            ----- Message from Haritha Winn sent at 6/8/2020  7:41 AM CDT -----  Contact: Pt  Name of Caller: massimo Espinoza  Reason for Visit/Symptoms: left knee f/u  Best Contact Number or Confirm if Mychart Preferred: [phone]  Preferred Date/Time of Appointment: First available    Interested in Virtual Visit (yes/no): No  Additional Information:  Pt asked if she can get a callback to r/s her appts

## 2020-07-27 ENCOUNTER — OFFICE VISIT (OUTPATIENT)
Dept: BARIATRICS | Facility: CLINIC | Age: 70
End: 2020-07-27
Payer: MEDICARE

## 2020-07-27 VITALS
SYSTOLIC BLOOD PRESSURE: 128 MMHG | BODY MASS INDEX: 37.04 KG/M2 | WEIGHT: 196.19 LBS | HEIGHT: 61 IN | DIASTOLIC BLOOD PRESSURE: 59 MMHG

## 2020-07-27 DIAGNOSIS — E66.01 CLASS 2 SEVERE OBESITY WITH BODY MASS INDEX (BMI) OF 35 TO 39.9 WITH SERIOUS COMORBIDITY: Primary | ICD-10-CM

## 2020-07-27 PROCEDURE — 3074F SYST BP LT 130 MM HG: CPT | Mod: CPTII,S$GLB,, | Performed by: INTERNAL MEDICINE

## 2020-07-27 PROCEDURE — 1101F PR PT FALLS ASSESS DOC 0-1 FALLS W/OUT INJ PAST YR: ICD-10-PCS | Mod: CPTII,S$GLB,, | Performed by: INTERNAL MEDICINE

## 2020-07-27 PROCEDURE — 99213 PR OFFICE/OUTPT VISIT, EST, LEVL III, 20-29 MIN: ICD-10-PCS | Mod: S$GLB,,, | Performed by: INTERNAL MEDICINE

## 2020-07-27 PROCEDURE — 3008F PR BODY MASS INDEX (BMI) DOCUMENTED: ICD-10-PCS | Mod: CPTII,S$GLB,, | Performed by: INTERNAL MEDICINE

## 2020-07-27 PROCEDURE — 99999 PR PBB SHADOW E&M-EST. PATIENT-LVL IV: ICD-10-PCS | Mod: PBBFAC,,, | Performed by: INTERNAL MEDICINE

## 2020-07-27 PROCEDURE — 1126F AMNT PAIN NOTED NONE PRSNT: CPT | Mod: S$GLB,,, | Performed by: INTERNAL MEDICINE

## 2020-07-27 PROCEDURE — 3078F DIAST BP <80 MM HG: CPT | Mod: CPTII,S$GLB,, | Performed by: INTERNAL MEDICINE

## 2020-07-27 PROCEDURE — 1101F PT FALLS ASSESS-DOCD LE1/YR: CPT | Mod: CPTII,S$GLB,, | Performed by: INTERNAL MEDICINE

## 2020-07-27 PROCEDURE — 1159F PR MEDICATION LIST DOCUMENTED IN MEDICAL RECORD: ICD-10-PCS | Mod: S$GLB,,, | Performed by: INTERNAL MEDICINE

## 2020-07-27 PROCEDURE — 3008F BODY MASS INDEX DOCD: CPT | Mod: CPTII,S$GLB,, | Performed by: INTERNAL MEDICINE

## 2020-07-27 PROCEDURE — 99999 PR PBB SHADOW E&M-EST. PATIENT-LVL IV: CPT | Mod: PBBFAC,,, | Performed by: INTERNAL MEDICINE

## 2020-07-27 PROCEDURE — 3074F PR MOST RECENT SYSTOLIC BLOOD PRESSURE < 130 MM HG: ICD-10-PCS | Mod: CPTII,S$GLB,, | Performed by: INTERNAL MEDICINE

## 2020-07-27 PROCEDURE — 1126F PR PAIN SEVERITY QUANTIFIED, NO PAIN PRESENT: ICD-10-PCS | Mod: S$GLB,,, | Performed by: INTERNAL MEDICINE

## 2020-07-27 PROCEDURE — 3078F PR MOST RECENT DIASTOLIC BLOOD PRESSURE < 80 MM HG: ICD-10-PCS | Mod: CPTII,S$GLB,, | Performed by: INTERNAL MEDICINE

## 2020-07-27 PROCEDURE — 99213 OFFICE O/P EST LOW 20 MIN: CPT | Mod: S$GLB,,, | Performed by: INTERNAL MEDICINE

## 2020-07-27 PROCEDURE — 1159F MED LIST DOCD IN RCRD: CPT | Mod: S$GLB,,, | Performed by: INTERNAL MEDICINE

## 2020-07-27 RX ORDER — OMEPRAZOLE 20 MG/1
20 CAPSULE, DELAYED RELEASE ORAL
COMMUNITY
End: 2020-08-26

## 2020-07-27 RX ORDER — TOPIRAMATE 50 MG/1
50 TABLET, FILM COATED ORAL 2 TIMES DAILY
Qty: 180 TABLET | Refills: 1 | Status: SHIPPED | OUTPATIENT
Start: 2020-07-27 | End: 2021-02-02

## 2020-07-27 RX ORDER — MELOXICAM 7.5 MG/1
7.5 TABLET ORAL
COMMUNITY
End: 2020-08-26

## 2020-07-27 RX ORDER — SIMVASTATIN 10 MG/1
10 TABLET, FILM COATED ORAL
COMMUNITY
End: 2020-08-11

## 2020-07-27 RX ORDER — ROSUVASTATIN CALCIUM 10 MG/1
TABLET, COATED ORAL
COMMUNITY
Start: 2020-07-03

## 2020-07-27 NOTE — PROGRESS NOTES
"Subjective:       Patient ID: Kamla Espinoza is a 69 y.o. female.    Chief Complaint: Follow-up    Pt here today for follow-up. Has lost 12 lbs, net neg 52 lbs. Started 800-100 tayler diet and topiramate to 50 mg BID.  States she has been eating more vegetables and less starches. Maybe eating more lately 2/2 to nerves. Chips, etc.  She denies SE. Does feel appetite is down. She does she is getting more mobility, but not very activity. .       Follow-up   Associated symptoms include arthralgias. Pertinent negatives include no chest pain, chills or fever.     Review of Systems   Constitutional: Negative for chills and fever.   Respiratory: Negative for shortness of breath.         + snores   Cardiovascular: Positive for leg swelling. Negative for chest pain.   Gastrointestinal: Negative for constipation and diarrhea.        + gerd   Genitourinary: Positive for frequency. Negative for difficulty urinating and dysuria.   Musculoskeletal: Positive for arthralgias and back pain.   Neurological: Negative for dizziness and light-headedness.   Psychiatric/Behavioral: Negative for dysphoric mood. The patient is not nervous/anxious.        Objective:     BP (!) 128/59   Ht 5' 1" (1.549 m)   Wt 89 kg (196 lb 3.4 oz)   BMI 37.07 kg/m²    Physical Exam   Constitutional: She is oriented to person, place, and time. She appears well-developed. No distress.   Morbidly obese     HENT:   Head: Normocephalic and atraumatic.   Eyes: Pupils are equal, round, and reactive to light. EOM are normal. No scleral icterus.   Neck: Normal range of motion. Neck supple. No thyromegaly present.   Cardiovascular: Normal rate.   Pulmonary/Chest: Effort normal.   Musculoskeletal: Normal range of motion. She exhibits no edema.   Neurological: She is alert and oriented to person, place, and time. No cranial nerve deficit.   Skin: Skin is warm and dry. No erythema.   Psychiatric: She has a normal mood and affect. Her behavior is normal. Judgment " normal.   Vitals reviewed.      Assessment:       1. Class 2 severe obesity with body mass index (BMI) of 35 to 39.9 with serious comorbidity        Plan:         Kamla was seen today for follow-up.    Diagnoses and all orders for this visit:    Class 2 severe obesity with body mass index (BMI) of 35 to 39.9 with serious comorbidity    Other orders  -     topiramate (TOPAMAX) 50 MG tablet; Take 1 tablet (50 mg total) by mouth 2 (two) times daily.          Patient was informed that topiramate is used for migraine prevention and seizures. Weight loss is a common side effect that is well documented. S/he understands this. S/he was informed of the potential side effects such as serious and possibly fatal rash in which case the medication should be discontinued immediately. Paresthesias, forgetfulness, fatigue, kidney stones, GI symptoms, and changes in lab values such as electrolytes, blood counts and kidney function.      Continue topiramate to 50 mg BID.     Sit and Be Fit exercise program on [employer], [employer] or [employer] 3-4 times a week this month       Continue 800-1000 tayler diet.     Quarantine tips given.

## 2020-07-27 NOTE — PATIENT INSTRUCTIONS
Patient was informed that topiramate is used for migraine prevention and seizures. Weight loss is a common side effect that is well documented. S/he understands this. S/he was informed of the potential side effects such as serious and possibly fatal rash in which case the medication should be discontinued immediately. Paresthesias, forgetfulness, fatigue, kidney stones, GI symptoms, and changes in lab values such as electrolytes, blood counts and kidney function.      Continue topiramate to 50 mg BID.     Sit and Be Fit exercise program on [employer], [employer] or [employer] 3-4 times a week this month     Continue 800-1000 tayler diet          [facility]  Tips to Stay on Track During COVID-19      DO DON'T   Keep up with your food log  Maintaining your caloric intake and diet quality is critical for achieving your health and weight loss goals.  Download the [place] [employer] [place] and use [facility] Code 14665 to track food and fluid intake Feel Discouraged - You can do this!  There are a lot of changes happening in our world but don't let them discourage you. Focus on the future and remind yourself of all the work and effort you've put in so far.     Keep protein-rich foods stocked up  buy chicken and turkey in bulk and freeze them, keep dry or canned beans on hand, get the largest quantity of eggs available. Make sure you are getting your required protein intake (between  grams EACH DAY).   Drink fluid during meals or 30 minutes before/after eating  Your regular routine may have changed which may have caused some of your meal or snack times to change.  keep track of when you consume any liquid and time your meals accordingly. This will also help you make sure you are staying hydrated throughout the day   Continue with your protein drinks or bars  Order online or use grocery delivery service. Always make sure you order more WELL BEFORE you are running low, especially now when deliveries may take  longer to arrive.  Remember to check to make sure your protein shakes or bars have 4 gms of sugar or less.     Keep table sugar around  You may be more tempted to add it to your drinks or food if it is visible and easily accessible.   Try new recipes  Use this time to experiment in the kitchen and find some different healthy dishes you enjoy - you can use the nutrition booklet to help guide you.  If you cannot find your copy, please download it from our website @ [email or web address]  Click here to download [facility]'s Nutrition Binder.   Add tough or crunchy foods back into your diet too quickly  Raw veggies are great snack foods but adding them in too quickly after surgery will cause pain and discomfort   Eat your meals slowly and intentionally  You shouldn't have to rush out to be anywhere so really take your time and aim for each meal to last around 20-30 minutes.   Drink sugary/bubbly drinks or alcohol    It may be tempting especially if you are surrounded by others without these limitations, but these beverages will prevent weight loss and cause gastric  pain/discomfort     Listen to your body - try to recognize when you feel full.  Learning your body's signals can be difficult but it is a key step in your weight loss journey. While you are is this process of working on this step, be sure portion out the recommended quantities of foods and meals/snacks to prevent overeating.   Eat your meals using electronics  This is especially difficult at home where your use of computers, phones, and TVs are pretty much unregulated. Designate a meal spot or spots where there is limited distractions and you can focus on your food - maybe your kitchen table or on an outside porch or deck.   Continue taking vitamins and minerals  Take them at the same time each day and keep a log of when you take your supplements to make sure you don't miss any. These supplements are  essential for preventing malnutrition and other health problems that will deter your progress.   'Save' your appetite   You may feel like holding out from food as long as possible so you can eat a large meal later on, but your body needs energy throughout the day in order to properly fuel itself and keep you alert.  Try eating small meals throughout the day - use these meals as mini breaks from work or projects you are doing at home.   Stay active!  It is so important for both your physical and mental health that you get regular physical activity. Even if you can no longer physically go to the gym or to workout classes there are tons of online resources to keep you moving. Incorporate a specific exercise time into your daily home routine and keep a journal of your activity.   Order food delivery or take out   Most places are now offering delivery services, but it can still be difficult to find and choose healthy options. Choose to do a grocery store  or delivery instead- cooking food at home is less expensive then eating out!   Review the resources you've been provided and keep in touch with your healthcare team.  Let them know if you are struggling or experiencing any problems - they are here to help!  Call us to schedule a telehealth visit at [phone] Isolate yourself   It may be called 'social distancing' but that does not mean we can't still connect with one another. Phone calls or group video chats are great ways to keep in touch while staying at home. Any method of getting regular social time with friends and family will help to remind you that you're not in this alone.     Grocery List    Items to Keep Stocked in Your Kitchen  PROTEINS (Lean)    Eggs  Beans (canned and/or dried)  Skinless chicken/turkey   Tuna/[place] (canned and/or pouch)  Tofu or Tempeh  Aaliyah Veggie Burgers  Fish or shrimp (fresh or frozen)  Ground Beef (90% lean)  Steaks  Chobani Greek Yogurt  Cabot Cottage  Cheese  Hummus  Low-fat cheeses (Laughing Cow, Baby Bell, mozzarella string cheese)  Fairlife Non-fat Milk    Vegetables (non-starchy)  *veggies can be fresh or frozen    Broccoli   Cauliflower   Carrots   Onions   Cabbage   Radishes   Zucchini   Okra   Greens   Peppers   Spinach   Turnips   Mushrooms   Tomatoes   Celery   Lettuce   Asparagus  Eggplant   Green Onions   Kale    Fruits  *Fruits can be fresh or frozen    Apples  Oranges  Pears  Kiwi  Melon  Berries  Peaches  Unsweetened Applesauce    *Avoid fruits canned In syrup  *Stick to 1-2 servings of fruit/day   Vitamins    Flintstones Complete  Chewables    Super B-Complex tablets with 50 mg Thiamine    Nature's Way liquid Calcium Citrate + Vit D     Sublingual Vitamin B12   Other    Sugar-free Popsicles    Sugar-free Jello    Crystal Lite    Low Fat Condiments     Decaf Coffee/Tea           Foods to Avoid Having Around the House  Butter/Margarine Cookies Candy Chips  Pretzels Grits  Granola Popcorn Paredes Corn  Bread Alcohol Soda  Pasta  Rice  Cake/Pie Sausage Potatoes Ice Cream                 Tricks to Prevent Emotional Eating During Quarantine      Keep 'trigger foods' out of the house   Keep yourself distracted with work, games, music, or whatever hobby you enjoy. This may be the time to try a new activity!   Try fighting stress with breathing techniques, yoga, meditation, or prayer   Mix up your meals with a variety of dishes   Keep up with your food diary   Call or video chat with a friend or family   Plan your meals for specific time and try for smaller meals throughout the day   Pre-portion all meals and snacks    Step outside for some fresh air or do a quick exercise activity to reset yourself    *Avoid negative thoughts about yourself - if you have a slip-up, you are not a failure. Forgive yourself and focus on learning from it so you can prevent it for the future              Ways to Stay Active While Staying Inside      [employer] Videos -  free exercise and gym classes at any fitness level   Apps -tons of fitness apps are currently offering free trial periods and offer workouts that don't require equipment   Chores around the house, such as cleaning or gardening   Walk up and down the stairs   Video-chat with your regular workout marco and do an online class together   Make a playlist of your favorite fun songs and dance around - no need to worry about knowing any serious dance moves, just jump around get your heart rate up!    While you are limited to working out at home, you may find it easier to do short, mini workouts multiple times a day instead of all at once. Try to still get at least 30 minutes of physical activity in each day!   Physical Health = Mental Health    The health of both your mind and body are equally important -be sure you are taking the time to care for both. It is likely that the current health concerns and quarantine mandates caused significant changes to your normal routine. Although this can feel overwhelming and seem difficult to manage, there are ways you can take to manage these feelings and keep your mental and physical health journey on track. Here are some tips for self-care during quarantine:     Meditate, take deep breaths - find any practice that will help you center yourself.   Move around your house throughout the day. Avoid staying in the same seat or room for too long and try to work in an area of your house that get lots of sunlight if possible.   Get fresh air for a bit every day - being outside is a great way to improve your mood! You don't necessarily have to go far from your house. You could even just hang out on your porch for a while or take a walk around the block.    Get good sleep and maintain a regular sleep schedule   Connect with others. While we aren't able to physically be with others right now it is still so important to socialize and interact with other people, even if it is being done  remotely.    Keep yourself busy. Make a list of tasks that you want to complete around the house, start a new book, do some art projects, try journaling.

## 2020-08-05 ENCOUNTER — HOSPITAL ENCOUNTER (OUTPATIENT)
Dept: RADIOLOGY | Facility: HOSPITAL | Age: 70
Discharge: HOME | End: 2020-08-05
Attending: ORTHOPAEDIC SURGERY
Payer: MEDICARE

## 2020-08-05 ENCOUNTER — OFFICE VISIT (OUTPATIENT)
Dept: ORTHOPEDICS | Facility: CLINIC | Age: 70
End: 2020-08-05
Payer: MEDICARE

## 2020-08-05 VITALS — WEIGHT: 196.19 LBS | BODY MASS INDEX: 37.04 KG/M2 | HEIGHT: 61 IN

## 2020-08-05 DIAGNOSIS — M25.562 LEFT KNEE PAIN, UNSPECIFIED CHRONICITY: ICD-10-CM

## 2020-08-05 DIAGNOSIS — M25.562 CHRONIC PAIN OF LEFT KNEE: Primary | ICD-10-CM

## 2020-08-05 DIAGNOSIS — G89.29 CHRONIC PAIN OF LEFT KNEE: Primary | ICD-10-CM

## 2020-08-05 DIAGNOSIS — Z96.652 S/P TOTAL KNEE REPLACEMENT USING CEMENT, LEFT: ICD-10-CM

## 2020-08-05 PROCEDURE — 73562 X-RAY EXAM OF KNEE 3: CPT | Mod: 26,LT,, | Performed by: RADIOLOGY

## 2020-08-05 PROCEDURE — 1101F PT FALLS ASSESS-DOCD LE1/YR: CPT | Mod: CPTII,S$GLB,, | Performed by: ORTHOPAEDIC SURGERY

## 2020-08-05 PROCEDURE — 73562 XR KNEE ORTHO LEFT: ICD-10-PCS | Mod: 26,LT,, | Performed by: RADIOLOGY

## 2020-08-05 PROCEDURE — 73560 XR KNEE ORTHO LEFT: ICD-10-PCS | Mod: 26,59,RT, | Performed by: RADIOLOGY

## 2020-08-05 PROCEDURE — 1159F MED LIST DOCD IN RCRD: CPT | Mod: S$GLB,,, | Performed by: ORTHOPAEDIC SURGERY

## 2020-08-05 PROCEDURE — 3008F PR BODY MASS INDEX (BMI) DOCUMENTED: ICD-10-PCS | Mod: CPTII,S$GLB,, | Performed by: ORTHOPAEDIC SURGERY

## 2020-08-05 PROCEDURE — 99999 PR PBB SHADOW E&M-EST. PATIENT-LVL IV: ICD-10-PCS | Mod: PBBFAC,,, | Performed by: ORTHOPAEDIC SURGERY

## 2020-08-05 PROCEDURE — 1126F PR PAIN SEVERITY QUANTIFIED, NO PAIN PRESENT: ICD-10-PCS | Mod: S$GLB,,, | Performed by: ORTHOPAEDIC SURGERY

## 2020-08-05 PROCEDURE — 99213 PR OFFICE/OUTPT VISIT, EST, LEVL III, 20-29 MIN: ICD-10-PCS | Mod: S$GLB,,, | Performed by: ORTHOPAEDIC SURGERY

## 2020-08-05 PROCEDURE — 73560 X-RAY EXAM OF KNEE 1 OR 2: CPT | Mod: 26,59,RT, | Performed by: RADIOLOGY

## 2020-08-05 PROCEDURE — 1126F AMNT PAIN NOTED NONE PRSNT: CPT | Mod: S$GLB,,, | Performed by: ORTHOPAEDIC SURGERY

## 2020-08-05 PROCEDURE — 3008F BODY MASS INDEX DOCD: CPT | Mod: CPTII,S$GLB,, | Performed by: ORTHOPAEDIC SURGERY

## 2020-08-05 PROCEDURE — 99999 PR PBB SHADOW E&M-EST. PATIENT-LVL IV: CPT | Mod: PBBFAC,,, | Performed by: ORTHOPAEDIC SURGERY

## 2020-08-05 PROCEDURE — 99213 OFFICE O/P EST LOW 20 MIN: CPT | Mod: S$GLB,,, | Performed by: ORTHOPAEDIC SURGERY

## 2020-08-05 PROCEDURE — 73560 X-RAY EXAM OF KNEE 1 OR 2: CPT | Mod: TC,RT,59

## 2020-08-05 PROCEDURE — 73562 X-RAY EXAM OF KNEE 3: CPT | Mod: TC,LT

## 2020-08-05 PROCEDURE — 1101F PR PT FALLS ASSESS DOC 0-1 FALLS W/OUT INJ PAST YR: ICD-10-PCS | Mod: CPTII,S$GLB,, | Performed by: ORTHOPAEDIC SURGERY

## 2020-08-05 PROCEDURE — 1159F PR MEDICATION LIST DOCUMENTED IN MEDICAL RECORD: ICD-10-PCS | Mod: S$GLB,,, | Performed by: ORTHOPAEDIC SURGERY

## 2020-08-05 RX ORDER — TRAMADOL HYDROCHLORIDE 50 MG/1
50 TABLET ORAL EVERY 12 HOURS PRN
Qty: 30 TABLET | Refills: 0 | Status: SHIPPED | OUTPATIENT
Start: 2020-08-05

## 2020-08-05 NOTE — PROGRESS NOTES
"Subjective:      Patient ID: Kamla Espinoza is a 70 y.o. female.    Chief Complaint: Pain of the Left Knee    HPI  Kamla Espinoza has left knee pain.  The pain has slightly improved. The pain is located in the lateral aspect of the knee.  There  is radiation.  The pain radiates to the lateral leg.  There is not associated stiffness.   There is not catching and locking. The pain is described as burning. The pain is aggravated by nothing-it is intermittent.  It is alleviated by rest.  There is associated back pain.  Her history, medications and problem list were reviewed.    Review of Systems   Constitution: Negative for chills, fever and night sweats.   HENT: Negative for hearing loss.    Eyes: Negative for blurred vision and double vision.   Cardiovascular: Negative for chest pain, claudication and leg swelling.   Respiratory: Negative for shortness of breath.    Endocrine: Negative for polydipsia, polyphagia and polyuria.   Hematologic/Lymphatic: Negative for adenopathy and bleeding problem. Does not bruise/bleed easily.   Skin: Negative for poor wound healing.   Gastrointestinal: Negative for diarrhea and heartburn.   Genitourinary: Negative for bladder incontinence.   Neurological: Negative for focal weakness, headaches, numbness, paresthesias and sensory change.   Psychiatric/Behavioral: The patient is not nervous/anxious.    Allergic/Immunologic: Negative for persistent infections.         Objective:      Body mass index is 37.07 kg/m².  Vitals:    08/05/20 1055   Weight: 89 kg (196 lb 3.4 oz)   Height: 5' 1" (1.549 m)           General    Constitutional: She is oriented to person, place, and time. She appears well-developed and well-nourished.   HENT:   Head: Normocephalic and atraumatic.   Eyes: EOM are normal.   Cardiovascular: Normal rate.    Pulmonary/Chest: Effort normal.   Neurological: She is alert and oriented to person, place, and time.   Psychiatric: She has a normal mood and affect. Her " behavior is normal.     General Musculoskeletal Exam   Gait: normal       Right Knee Exam     Inspection   Erythema: absent  Scars: absent  Swelling: absent  Effusion: absent  Deformity: absent  Bruising: absent    Tenderness   The patient is experiencing no tenderness.     Range of Motion   Extension: 0   Flexion: 130     Tests   Ligament Examination Lachman: normal (-1 to 2mm)   MCL - Valgus: normal (0 to 2mm)  LCL - Varus: normal  Patella   Passive Patellar Tilt: neutral    Other   Sensation: normal    Left Knee Exam     Inspection   Erythema: absent  Scars: present  Swelling: absent  Effusion: absent  Deformity: absent  Bruising: absent    Tenderness   The patient tender to palpation of the lateral joint line.    Range of Motion   Extension: 0   Flexion: 130     Tests   Stability Lachman: normal (-1 to 2mm)   MCL - Valgus: normal (0 to 2mm)  LCL - Varus: normal (0 to 2mm)  Patella   Passive Patellar Tilt: neutral    Other   Sensation: normal    Muscle Strength   Right Lower Extremity   Hip Abduction: 5/5   Quadriceps:  5/5   Hamstrin/5   Left Lower Extremity   Hip Abduction: 5/5   Quadriceps:  5/5   Hamstrin/5     Reflexes     Left Side  Quadriceps:  2+    Right Side   Quadriceps:  2+    Vascular Exam     Right Pulses  Dorsalis Pedis:      2+          Left Pulses  Dorsalis Pedis:      2+          Edema  Right Lower Leg: absent  Left Lower Leg: absent    Radiographs taken today were reviewed by me.  There is a prosthetic replacement of the left knee(s). There is not evidence of bone loss, osteolysis, or loosening. There is not a fracture.                Assessment:       Encounter Diagnoses   Name Primary?    Chronic pain of left knee Yes    S/P total knee replacement using cement, left 2019           Plan:       Kamla was seen today for pain.    Diagnoses and all orders for this visit:    Chronic pain of left knee  -     Ambulatory referral/consult to Pain Clinic; Future    S/P total knee  replacement using cement, left 11/19/2019  -     Ambulatory referral/consult to Pain Clinic; Future        Options discussed.  Will send for RFA left knee and see her back afterwards.  I refilled her tramadol ONE TIME while she is waiting for RFA.

## 2020-08-11 ENCOUNTER — OFFICE VISIT (OUTPATIENT)
Dept: PAIN MEDICINE | Facility: CLINIC | Age: 70
End: 2020-08-11
Payer: MEDICARE

## 2020-08-11 VITALS
HEART RATE: 67 BPM | BODY MASS INDEX: 36.69 KG/M2 | SYSTOLIC BLOOD PRESSURE: 98 MMHG | WEIGHT: 194.31 LBS | DIASTOLIC BLOOD PRESSURE: 59 MMHG | HEIGHT: 61 IN

## 2020-08-11 DIAGNOSIS — G89.29 CHRONIC PAIN OF LEFT KNEE: ICD-10-CM

## 2020-08-11 DIAGNOSIS — M25.562 CHRONIC PAIN OF LEFT KNEE: ICD-10-CM

## 2020-08-11 DIAGNOSIS — Z96.652 S/P TOTAL KNEE REPLACEMENT USING CEMENT, LEFT: ICD-10-CM

## 2020-08-11 PROCEDURE — 1126F PR PAIN SEVERITY QUANTIFIED, NO PAIN PRESENT: ICD-10-PCS | Mod: S$GLB,,, | Performed by: PAIN MEDICINE

## 2020-08-11 PROCEDURE — 1159F PR MEDICATION LIST DOCUMENTED IN MEDICAL RECORD: ICD-10-PCS | Mod: S$GLB,,, | Performed by: PAIN MEDICINE

## 2020-08-11 PROCEDURE — 99999 PR PBB SHADOW E&M-EST. PATIENT-LVL V: ICD-10-PCS | Mod: PBBFAC,,, | Performed by: PAIN MEDICINE

## 2020-08-11 PROCEDURE — 3008F BODY MASS INDEX DOCD: CPT | Mod: CPTII,S$GLB,, | Performed by: PAIN MEDICINE

## 2020-08-11 PROCEDURE — 1101F PT FALLS ASSESS-DOCD LE1/YR: CPT | Mod: CPTII,S$GLB,, | Performed by: PAIN MEDICINE

## 2020-08-11 PROCEDURE — 1126F AMNT PAIN NOTED NONE PRSNT: CPT | Mod: S$GLB,,, | Performed by: PAIN MEDICINE

## 2020-08-11 PROCEDURE — 99204 OFFICE O/P NEW MOD 45 MIN: CPT | Mod: S$GLB,,, | Performed by: PAIN MEDICINE

## 2020-08-11 PROCEDURE — 1159F MED LIST DOCD IN RCRD: CPT | Mod: S$GLB,,, | Performed by: PAIN MEDICINE

## 2020-08-11 PROCEDURE — 1101F PR PT FALLS ASSESS DOC 0-1 FALLS W/OUT INJ PAST YR: ICD-10-PCS | Mod: CPTII,S$GLB,, | Performed by: PAIN MEDICINE

## 2020-08-11 PROCEDURE — 3074F PR MOST RECENT SYSTOLIC BLOOD PRESSURE < 130 MM HG: ICD-10-PCS | Mod: CPTII,S$GLB,, | Performed by: PAIN MEDICINE

## 2020-08-11 PROCEDURE — 3078F DIAST BP <80 MM HG: CPT | Mod: CPTII,S$GLB,, | Performed by: PAIN MEDICINE

## 2020-08-11 PROCEDURE — 3074F SYST BP LT 130 MM HG: CPT | Mod: CPTII,S$GLB,, | Performed by: PAIN MEDICINE

## 2020-08-11 PROCEDURE — 99999 PR PBB SHADOW E&M-EST. PATIENT-LVL V: CPT | Mod: PBBFAC,,, | Performed by: PAIN MEDICINE

## 2020-08-11 PROCEDURE — 3078F PR MOST RECENT DIASTOLIC BLOOD PRESSURE < 80 MM HG: ICD-10-PCS | Mod: CPTII,S$GLB,, | Performed by: PAIN MEDICINE

## 2020-08-11 PROCEDURE — 99204 PR OFFICE/OUTPT VISIT, NEW, LEVL IV, 45-59 MIN: ICD-10-PCS | Mod: S$GLB,,, | Performed by: PAIN MEDICINE

## 2020-08-11 PROCEDURE — 3008F PR BODY MASS INDEX (BMI) DOCUMENTED: ICD-10-PCS | Mod: CPTII,S$GLB,, | Performed by: PAIN MEDICINE

## 2020-08-11 NOTE — LETTER
August 12, 2020      Raffaele Saucedo MD  [address]  [facility] [address]           [facility]  [address], [facility] [address]  [facility] [address]  Phone: [phone]  Fax: [phone]          Patient: Kamla Espinoza   MR Number: [id number]   YOB: 1950   Date of Visit: 8/11/2020       Dear Dr. Raffaele Saucedo:    Thank you for referring Kamla Espinoza to me for evaluation. Attached you will find relevant portions of my assessment and plan of care.    If you have questions, please do not hesitate to call me. I look forward to following Kamla Espinoza along with you.    Sincerely,    Jeramy Rojas Jr., MD    Enclosure  CC:  No Recipients    If you would like to receive this communication electronically, please contact [email or web address] or [phone] to request more information on [employer] access.    For providers and/or their staff who would like to refer a patient to [facility], please contact us through our one-stop-shop provider referral line, [facility], at [phone].    If you feel you have received this communication in error or would no longer like to receive these types of communications, please e-mail [email or web address]

## 2020-08-11 NOTE — PATIENT INSTRUCTIONS
Reviewed pre op instructions with patient:    1. Please leave jewelry and valuables at home or give them to your escort for safe keeping.  2. You will be required to have an adult to escort you after the procedure is over. Although we will not be sedating you for your procedure we ask for an escort for safety after the procedure.  3. Do not apply lotions, creams, deodorants, perfumes, or colognes the morning of the procedure.  4. Do not take OTC blood thinning medications beginning 7 days before the procedure (aspirin, Motrin, ibuprofen, Advil, Aleve, naproxen). If you are taking prescribed thinners (Coumadin, warfarin, apixaban, Eliquis, Plavix, clopidogrel, Pletal, etc) we will obtain cardiac clearance from your cardiologist or provider that is monitoring your medications and levels to hold the medications if appropriate.  5. Cleanse your skin the evening before with an antibacterial soap and then repeat it again the morning of the procedure.  6. You will be contacted 2-3 days prior to the procedure to discuss COVID testing and when to arrive the morning of the procedure.  7. Please do not eat or drink after midnight before the procedure.    Patient verbalized understanding of the instructions provided to them and clinic contact number was provided for any further questions they may have.

## 2020-08-11 NOTE — PROGRESS NOTES
Subjective:     Patient ID: Kamla Espinoza is a 70 y.o. female    Chief Complaint: Knee Pain (left)      Referred by: Raffaele Saucedo MD      HPI:    Initial Encounter (8/12/20):  Kamla Espinoza is a 70 y.o. female who presents today with chronic left knee pain.  Status post left total knee replacement in November of 2019.  Patient continues to have pain surrounding the left knee.  She reports a sensation of ice water being poured on her leg/knee.  She also reports significant pain in the joint.  Pain is worse with activity.  She was referred to my clinic to be evaluated for left knee radiofrequency ablation.   This pain is described in detail below.    Physical Therapy:  Yes.    Non-pharmacologic Treatment:  Rest helps         · TENS?  No    Pain Medications:         · Currently taking:  Meloxicam, tramadol, Voltaren gel    · Has tried in the past:  Percocet, ibuprofen    · Has not tried:   Muscle relaxants, TCAs, SNRIs, anticonvulsants    Blood thinners:  None    Interventional Therapies:  None    Relevant Surgeries:  Status post left total knee replacement in November 2019    Affecting sleep?  Yes    Affecting daily activities? yes    Depressive symptoms? yes          · SI/HI? No    Work status: Retired    Pain Scores:    Best:       0/10  Worst:     10/10  Usually:   6/10  Today:    0/10    Review of Systems   Constitutional: Negative for activity change, appetite change, chills, fatigue, fever and unexpected weight change.   HENT: Negative for hearing loss.    Eyes: Negative for visual disturbance.   Respiratory: Negative for chest tightness and shortness of breath.    Cardiovascular: Negative for chest pain.   Gastrointestinal: Negative for abdominal pain, constipation, diarrhea, nausea and vomiting.   Genitourinary: Negative for difficulty urinating.   Musculoskeletal: Positive for arthralgias, gait problem and myalgias. Negative for back pain and neck pain.   Skin: Negative for rash.    Neurological: Negative for dizziness, weakness, light-headedness, numbness and headaches.   Psychiatric/Behavioral: Positive for sleep disturbance. Negative for hallucinations and suicidal ideas. The patient is not nervous/anxious.        Past Medical History:   Diagnosis Date    Arthritis     Diabetes mellitus, type 2     Disorder of kidney and ureter     GERD (gastroesophageal reflux disease)     High cholesterol     Hypertension        Past Surgical History:   Procedure Laterality Date    HIP SURGERY Left 04/22/2016    THR    JOINT REPLACEMENT      TOTAL KNEE ARTHROPLASTY Left 11/19/2019    Procedure: ARTHROPLASTY, KNEE, TOTAL;  Surgeon: Raffaele Saucedo MD;  Location: [facility];  Service: Orthopedics;  Laterality: Left;       Social History     Socioeconomic History    Marital status:      Spouse name: Not on file    Number of children: Not on file    Years of education: Not on file    Highest education level: Not on file   Occupational History    Not on file   Social Needs    Financial resource strain: Not on file    Food insecurity     Worry: Not on file     Inability: Not on file    Transportation needs     Medical: Not on file     Non-medical: Not on file   Tobacco Use    Smoking status: Former Smoker     Types: Cigarettes     Quit date: 3/17/2010     Years since quitting: 10.4    Smokeless tobacco: Never Used   Substance and Sexual Activity    Alcohol use: Yes     Alcohol/week: 0.0 standard drinks     Comment: once a month     Drug use: No    Sexual activity: Not on file   Lifestyle    Physical activity     Days per week: Not on file     Minutes per session: Not on file    Stress: Not on file   Relationships    Social connections     Talks on phone: Not on file     Gets together: Not on file     Attends Hoahaoism service: Not on file     Active member of club or organization: Not on file     Attends meetings of clubs or organizations: Not on file     Relationship status:  Not on file   Other Topics Concern    Not on file   Social History Narrative    Not on file       Review of patient's allergies indicates:  No Known Allergies    Current Outpatient Medications on File Prior to Visit   Medication Sig Dispense Refill    ACCU-CHEK JHONATHAN CONTROL SOLN Soln       ACCU-CHEK JHONATHAN PLUS TEST STRP Strp       ACCU-CHEK SOFTCLIX LANCETS Misc       acetaminophen (TYLENOL) 650 MG TbSR Take 1 tablet (650 mg total) by mouth every 8 (eight) hours as needed. 120 tablet 0    amlodipine (NORVASC) 5 MG tablet Take 10 mg by mouth once daily.       BD ALCOHOL SWABS PadM       calcium carbonate 300 mg (750 mg) Chew Take by mouth. tums for acid reflux      diclofenac sodium (VOLTAREN) 1 % Gel Apply 2 g topically once daily. 1 Tube 3    fluticasone propionate (FLONASE) 50 mcg/actuation nasal spray 1 spray by Nasal route.      ibuprofen (ADVIL,MOTRIN) 800 MG tablet Take 800 mg by mouth daily as needed for Pain.      lisinopril-hydrochlorothiazide (PRINZIDE,ZESTORETIC) 20-25 mg Tab Take 1 tablet by mouth once daily.      meloxicam (MOBIC) 7.5 MG tablet Take 7.5 mg by mouth.      metformin (GLUCOPHAGE) 500 MG tablet Take 500 mg by mouth 2 (two) times daily with meals.      omeprazole (PRILOSEC) 20 MG capsule Take 20 mg by mouth.      OXYBUTYNIN CHLORIDE ORAL Take by mouth every evening.      rosuvastatin (CRESTOR) 10 MG tablet       topiramate (TOPAMAX) 50 MG tablet Take 1 tablet (50 mg total) by mouth 2 (two) times daily. 180 tablet 1    traMADoL (ULTRAM) 50 mg tablet Take 1 tablet (50 mg total) by mouth every 12 (twelve) hours as needed for Pain. 30 tablet 0    [DISCONTINUED] diclofenac sodium 1 % Gel Apply 2 g topically as needed.       [DISCONTINUED] oxyCODONE (ROXICODONE) 5 MG immediate release tablet Take 1-2 tablets by mouth every 4-6 hours as needed for pain (Patient not taking: Reported on 8/5/2020) 50 tablet 0     No current facility-administered medications on file prior to  "visit.        Objective:      BP (!) 98/59 (BP Location: Left arm, Patient Position: Sitting, BP Method: Large (Automatic))   Pulse 67   Ht 5' 1" (1.549 m)   Wt 88.2 kg (194 lb 5.4 oz)   BMI 36.72 kg/m²     Exam:  GEN:  Well developed, well nourished.  No acute distress.   HEENT:  No trauma.  Mucous membranes moist.  Nares patent bilaterally.  PSYCH: Normal affect. Thought content appropriate.  CHEST:  Breathing symmetric.  No audible wheezing.  ABD: Soft, non-distended.  SKIN:  Warm, pink, dry.  No rash on exposed areas.    EXT:  No cyanosis, clubbing, or edema.  No color change or changes in nail or hair growth.  NEURO/MUSCULOSKELETAL:  Fully alert, oriented, and appropriate. Speech normal onelia. No cranial nerve deficits.   Gait:  Antalgic.  No focal motor deficits.     Left knee with well-healed anterior surgical scar from left total knee replacement  Full active range of motion of left knee  No gross swelling, erythema, warmth bilateral knees  No joint laxity or instability noted on examination      Imaging:    Narrative & Impression    EXAMINATION:  XR KNEE ORTHO LEFT     CLINICAL HISTORY:  left knee pain;  Pain in left knee     FINDINGS:  Left: There is a TKA in place good alignment no complication.     Right: There is moderate-severe DJD and a varus deformity.  No fracture dislocation bone destruction seen.        Electronically signed by: Brennon Cates MD  Date:                                            08/05/2020  Time:                                           10:56         Assessment:       Encounter Diagnoses   Name Primary?   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 female with chronic left knee pain status post total knee replacement in November of 2019.    1.  Pertinent imaging studies reviewed by me. Imaging results were discussed with patient.  2.  Schedule for left knee peripheral nerve blocks under fluoroscopy.  If diagnostic can proceed with radiofrequency ablation.  3.  Return to clinic 2 weeks after procedure to discuss results.

## 2020-08-13 DIAGNOSIS — M25.562 CHRONIC PAIN OF LEFT KNEE: Primary | ICD-10-CM

## 2020-08-13 DIAGNOSIS — G89.29 CHRONIC PAIN OF LEFT KNEE: Primary | ICD-10-CM

## 2020-08-24 NOTE — ANESTHESIA PROCEDURE NOTES
CSE    Patient location during procedure: OR  Start time: 11/19/2019 1:49 PM  Timeout: 11/19/2019 1:49 PM  End time: 11/19/2019 1:59 PM    Staffing  Authorizing Provider: Kalin Pineda MD  Performing Provider: Kalin Pineda MD    Preanesthetic Checklist  Completed: patient identified, site marked, surgical consent, pre-op evaluation, timeout performed, IV checked, risks and benefits discussed and monitors and equipment checked  CSE  Patient position: sitting  Prep: ChloraPrep  Patient monitoring: heart rate, cardiac monitor, continuous pulse ox, continuous capnometry and frequent blood pressure checks  Approach: midline  Spinal Needle  Needle type: Umang   Needle gauge: 25 G  Needle length: 5 in  Epidural Needle  Injection technique: CASEY air  Needle type: Tuohy   Needle gauge: 17 G  Needle length: 3.5 in  Location: L4-5  Needle localization: anatomical landmarks  Catheter  Catheter size: 19 G  Test dose: lidocaine 1.5% with Epi 1-to-200,000  Additional Documentation: incremental injection, negative aspiration for CSF, negative aspiration for heme, no paresthesia on injection and negative test dose  Assessment  Sensory level: T10   Dermatomal levels determined by alcohol swab  Intrathecal Medications:  administered: primary anesthetic mcg of              
Peripheral Block    Patient location during procedure: pre-op   Block not for primary anesthetic.  Reason for block: at surgeon's request and post-op pain management   Post-op Pain Location: Left Knee OA and TKA  Start time: 11/19/2019 12:55 PM  Timeout: 11/19/2019 12:55 PM   End time: 11/19/2019 1:15 PM    Staffing  Authorizing Provider: Kalin Pineda MD  Performing Provider: Kalin Pineda MD    Preanesthetic Checklist  Completed: patient identified, site marked, surgical consent, pre-op evaluation, timeout performed, IV checked, risks and benefits discussed and monitors and equipment checked  Peripheral Block  Patient position: supine  Prep: ChloraPrep  Patient monitoring: heart rate, cardiac monitor, continuous pulse ox, continuous capnometry and frequent blood pressure checks  Block type: I PACK  Laterality: left  Injection technique: single shot  Needle  Needle type: Stimuplex   Needle gauge: 21 G  Needle length: 4 in  Needle localization: anatomical landmarks and ultrasound guidance   -ultrasound image captured on disc.  Assessment  Injection assessment: negative aspiration, negative parasthesia and local visualized surrounding nerve  Paresthesia pain: none  Heart rate change: no  Slow fractionated injection: yes  Additional Notes  VSS.  [facility] RN monitoring vitals throughout procedure.  Patient tolerated procedure well.  And with Decadron.             
Peripheral Block    Patient location during procedure: pre-op   Block not for primary anesthetic.  Reason for block: at surgeon's request and post-op pain management   Post-op Pain Location: Left Knee OA, TKA  Start time: 11/19/2019 12:55 PM  Timeout: 11/19/2019 12:55 PM   End time: 11/19/2019 1:15 PM    Staffing  Authorizing Provider: Kalin Pineda MD  Performing Provider: Kalin Pineda MD    Preanesthetic Checklist  Completed: patient identified, site marked, surgical consent, pre-op evaluation, timeout performed, IV checked, risks and benefits discussed and monitors and equipment checked  Peripheral Block  Patient position: supine  Prep: ChloraPrep  Patient monitoring: heart rate, cardiac monitor, continuous pulse ox, continuous capnometry and frequent blood pressure checks  Block type: adductor canal  Laterality: left  Injection technique: continuous  Needle  Needle type: Tuohy   Needle gauge: 17 G  Needle length: 3.5 in  Needle localization: ultrasound guidance  Catheter type: spring wound  Catheter size: 19 G  Test dose: lidocaine 1.5% with Epi 1-to-200,000 and negative   -ultrasound image captured on disc.  Assessment  Injection assessment: negative aspiration, negative parasthesia and local visualized surrounding nerve  Paresthesia pain: none  Heart rate change: no  Slow fractionated injection: yes  Additional Notes  VSS.  [facility] RN monitoring vitals throughout procedure.  Patient tolerated procedure well. With Decadron and Epi.               
No

## 2020-09-03 PROBLEM — M25.562 LEFT KNEE PAIN: Status: ACTIVE | Noted: 2020-09-03

## 2020-10-01 ENCOUNTER — OFFICE VISIT (OUTPATIENT)
Dept: PAIN MEDICINE | Facility: CLINIC | Age: 70
End: 2020-10-01
Payer: MEDICARE

## 2020-10-01 VITALS
WEIGHT: 195.56 LBS | DIASTOLIC BLOOD PRESSURE: 66 MMHG | BODY MASS INDEX: 35.99 KG/M2 | HEIGHT: 62 IN | HEART RATE: 73 BPM | SYSTOLIC BLOOD PRESSURE: 125 MMHG

## 2020-10-01 DIAGNOSIS — M25.562 CHRONIC PAIN OF LEFT KNEE: Primary | ICD-10-CM

## 2020-10-01 DIAGNOSIS — Z96.652 S/P TOTAL KNEE REPLACEMENT USING CEMENT, LEFT: ICD-10-CM

## 2020-10-01 DIAGNOSIS — G89.29 CHRONIC PAIN OF LEFT KNEE: Primary | ICD-10-CM

## 2020-10-01 PROCEDURE — 3074F SYST BP LT 130 MM HG: CPT | Mod: CPTII,S$GLB,, | Performed by: PAIN MEDICINE

## 2020-10-01 PROCEDURE — 3078F PR MOST RECENT DIASTOLIC BLOOD PRESSURE < 80 MM HG: ICD-10-PCS | Mod: CPTII,S$GLB,, | Performed by: PAIN MEDICINE

## 2020-10-01 PROCEDURE — 3008F BODY MASS INDEX DOCD: CPT | Mod: CPTII,S$GLB,, | Performed by: PAIN MEDICINE

## 2020-10-01 PROCEDURE — 99213 PR OFFICE/OUTPT VISIT, EST, LEVL III, 20-29 MIN: ICD-10-PCS | Mod: S$GLB,,, | Performed by: PAIN MEDICINE

## 2020-10-01 PROCEDURE — 99999 PR PBB SHADOW E&M-EST. PATIENT-LVL IV: ICD-10-PCS | Mod: PBBFAC,,, | Performed by: PAIN MEDICINE

## 2020-10-01 PROCEDURE — 1159F PR MEDICATION LIST DOCUMENTED IN MEDICAL RECORD: ICD-10-PCS | Mod: S$GLB,,, | Performed by: PAIN MEDICINE

## 2020-10-01 PROCEDURE — 1126F PR PAIN SEVERITY QUANTIFIED, NO PAIN PRESENT: ICD-10-PCS | Mod: S$GLB,,, | Performed by: PAIN MEDICINE

## 2020-10-01 PROCEDURE — 3008F PR BODY MASS INDEX (BMI) DOCUMENTED: ICD-10-PCS | Mod: CPTII,S$GLB,, | Performed by: PAIN MEDICINE

## 2020-10-01 PROCEDURE — 1159F MED LIST DOCD IN RCRD: CPT | Mod: S$GLB,,, | Performed by: PAIN MEDICINE

## 2020-10-01 PROCEDURE — 99999 PR PBB SHADOW E&M-EST. PATIENT-LVL IV: CPT | Mod: PBBFAC,,, | Performed by: PAIN MEDICINE

## 2020-10-01 PROCEDURE — 1101F PT FALLS ASSESS-DOCD LE1/YR: CPT | Mod: CPTII,S$GLB,, | Performed by: PAIN MEDICINE

## 2020-10-01 PROCEDURE — 1101F PR PT FALLS ASSESS DOC 0-1 FALLS W/OUT INJ PAST YR: ICD-10-PCS | Mod: CPTII,S$GLB,, | Performed by: PAIN MEDICINE

## 2020-10-01 PROCEDURE — 1126F AMNT PAIN NOTED NONE PRSNT: CPT | Mod: S$GLB,,, | Performed by: PAIN MEDICINE

## 2020-10-01 PROCEDURE — 3078F DIAST BP <80 MM HG: CPT | Mod: CPTII,S$GLB,, | Performed by: PAIN MEDICINE

## 2020-10-01 PROCEDURE — 99213 OFFICE O/P EST LOW 20 MIN: CPT | Mod: S$GLB,,, | Performed by: PAIN MEDICINE

## 2020-10-01 PROCEDURE — 3074F PR MOST RECENT SYSTOLIC BLOOD PRESSURE < 130 MM HG: ICD-10-PCS | Mod: CPTII,S$GLB,, | Performed by: PAIN MEDICINE

## 2020-10-01 NOTE — PROGRESS NOTES
Subjective:     Patient ID: Kamla Espinoza is a 70 y.o. female    Chief Complaint: Follow-up      Referred by: No ref. provider found      HPI:    Interval History (10/1/20):  She returns today for follow up.  She reports that left knee genicular radiofrequency ablation has been helpful for the left knee pain.  She reports 100% relief of her symptoms.  She is very happy with the results and states she is now able to perform any activities that she was once in too much pain to perform.      Initial Encounter (8/12/20):  Kamla Espinoza is a 70 y.o. female who presents today with chronic left knee pain.  Status post left total knee replacement in November of 2019.  Patient continues to have pain surrounding the left knee.  She reports a sensation of ice water being poured on her leg/knee.  She also reports significant pain in the joint.  Pain is worse with activity.  She was referred to my clinic to be evaluated for left knee radiofrequency ablation.   This pain is described in detail below.    Physical Therapy:  Yes.    Non-pharmacologic Treatment:  Rest helps         · TENS?  No    Pain Medications:         · Currently taking:  Meloxicam, tramadol, Voltaren gel    · Has tried in the past:  Percocet, ibuprofen    · Has not tried:   Muscle relaxants, TCAs, SNRIs, anticonvulsants    Blood thinners:  None    Interventional Therapies:    9/17/20 - left knee genicular radiofrequency ablation - 100% relief    Relevant Surgeries:  Status post left total knee replacement in November 2019    Affecting sleep?  Yes    Affecting daily activities? yes    Depressive symptoms? yes          · SI/HI? No    Work status: Retired    Pain Scores:    Best:       0/10  Worst:     10/10  Usually:   6/10  Today:    0/10    Review of Systems   Constitutional: Negative for activity change, appetite change, chills, fatigue, fever and unexpected weight change.   HENT: Negative for hearing loss.    Eyes: Negative for visual  disturbance.   Respiratory: Negative for chest tightness and shortness of breath.    Cardiovascular: Negative for chest pain.   Gastrointestinal: Negative for abdominal pain, constipation, diarrhea, nausea and vomiting.   Genitourinary: Negative for difficulty urinating.   Musculoskeletal: Negative for arthralgias, back pain, gait problem, myalgias and neck pain.   Skin: Negative for rash.   Neurological: Negative for dizziness, weakness, light-headedness, numbness and headaches.   Psychiatric/Behavioral: Positive for sleep disturbance. Negative for hallucinations and suicidal ideas. The patient is not nervous/anxious.        Past Medical History:   Diagnosis Date    Arthritis     Diabetes mellitus, type 2     Disorder of kidney and ureter     GERD (gastroesophageal reflux disease)     High cholesterol     Hypertension        Past Surgical History:   Procedure Laterality Date    HIP SURGERY Left 04/22/2016    THR    INJECTION OF ANESTHETIC AGENT AROUND NERVE Left 9/3/2020    Procedure: Block, Nerve---LEFT KNEE PERIPHERAL NERVE BLOCK;  Surgeon: Jeramy Rojas Jr., MD;  Location: [facility];  Service: Pain Management;  Laterality: Left;    JOINT REPLACEMENT      nerve block knee Left 09/03/2020    RADIOFREQUENCY ABLATION Left 9/17/2020    Procedure: Radiofrequency Ablation---LEFT KNEE PERIPHERAL NERVE RFA;  Surgeon: Jeramy Rojas Jr., MD;  Location: [facility];  Service: Pain Management;  Laterality: Left;    TOTAL KNEE ARTHROPLASTY Left 11/19/2019    Procedure: ARTHROPLASTY, KNEE, TOTAL;  Surgeon: Raffaele Saucedo MD;  Location: [facility];  Service: Orthopedics;  Laterality: Left;       Social History     Socioeconomic History    Marital status:      Spouse name: Not on file    Number of children: Not on file    Years of education: Not on file    Highest education level: Not on file   Occupational History    Not on file   Social Needs    Financial resource strain: Not on file    Food  insecurity     Worry: Not on file     Inability: Not on file    Transportation needs     Medical: Not on file     Non-medical: Not on file   Tobacco Use    Smoking status: Former Smoker     Types: Cigarettes     Quit date: 3/17/2010     Years since quitting: 10.5    Smokeless tobacco: Never Used   Substance and Sexual Activity    Alcohol use: Yes     Alcohol/week: 0.0 standard drinks     Comment: once a month     Drug use: No    Sexual activity: Not on file   Lifestyle    Physical activity     Days per week: Not on file     Minutes per session: Not on file    Stress: Not on file   Relationships    Social connections     Talks on phone: Not on file     Gets together: Not on file     Attends Mu-ism service: Not on file     Active member of club or organization: Not on file     Attends meetings of clubs or organizations: Not on file     Relationship status: Not on file   Other Topics Concern    Not on file   Social History Narrative    Not on file       Review of patient's allergies indicates:  No Known Allergies    Current Outpatient Medications on File Prior to Visit   Medication Sig Dispense Refill    ACCU-CHEK JHONATHAN CONTROL SOLN Soln       ACCU-CHEK JHONATHAN PLUS TEST STRP Strp       ACCU-CHEK SOFTCLIX LANCETS Misc       acetaminophen (TYLENOL) 650 MG TbSR Take 1 tablet (650 mg total) by mouth every 8 (eight) hours as needed. 120 tablet 0    amlodipine (NORVASC) 5 MG tablet Take 10 mg by mouth once daily.       BD ALCOHOL SWABS PadM       calcium carbonate 300 mg (750 mg) Chew Take by mouth. tums for acid reflux      diclofenac sodium (VOLTAREN) 1 % Gel Apply 2 g topically once daily. 1 Tube 3    fluticasone propionate (FLONASE) 50 mcg/actuation nasal spray 1 spray by Nasal route.      lisinopril-hydrochlorothiazide (PRINZIDE,ZESTORETIC) 20-25 mg Tab Take 1 tablet by mouth once daily.      metformin (GLUCOPHAGE) 500 MG tablet Take 500 mg by mouth 2 (two) times daily with meals.       "oxybutynin (DITROPAN-XL) 5 MG TR24 Take 5 mg by mouth once daily.      rosuvastatin (CRESTOR) 10 MG tablet       topiramate (TOPAMAX) 50 MG tablet Take 1 tablet (50 mg total) by mouth 2 (two) times daily. 180 tablet 1    traMADoL (ULTRAM) 50 mg tablet Take 1 tablet (50 mg total) by mouth every 12 (twelve) hours as needed for Pain. 30 tablet 0     No current facility-administered medications on file prior to visit.        Objective:      /66 (BP Location: Left arm, Patient Position: Sitting, BP Method: Large (Automatic))   Pulse 73   Ht 5' 2" (1.575 m)   Wt 88.7 kg (195 lb 8.8 oz)   BMI 35.77 kg/m²     Exam:  GEN:  Well developed, well nourished.  No acute distress.   HEENT:  No trauma.  Mucous membranes moist.  Nares patent bilaterally.  PSYCH: Normal affect. Thought content appropriate.  CHEST:  Breathing symmetric.  No audible wheezing.  ABD: Soft, non-distended.  SKIN:  Warm, pink, dry.  No rash on exposed areas.    EXT:  No cyanosis, clubbing, or edema.  No color change or changes in nail or hair growth.  NEURO/MUSCULOSKELETAL:  Fully alert, oriented, and appropriate. Speech normal onelia. No cranial nerve deficits.   Gait:  Antalgic.  No focal motor deficits.         Imaging:    Narrative & Impression    EXAMINATION:  XR KNEE ORTHO LEFT     CLINICAL HISTORY:  left knee pain;  Pain in left knee     FINDINGS:  Left: There is a TKA in place good alignment no complication.     Right: There is moderate-severe DJD and a varus deformity.  No fracture dislocation bone destruction seen.        Electronically signed by: Brennon Cates MD  Date:                                            08/05/2020  Time:                                           10:56         Assessment:       Encounter Diagnoses   Name Primary?   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 female with chronic left knee pain status post total knee replacement in November of 2019.  100% relief following left genicular nerve radiofrequency ablation.    1.  No further treatment needed at this time.  Patient getting 100% relief following genicular nerve radiofrequency ablation.  2.  Return to clinic as needed.  Can repeat RFA in the future if needed.

## 2020-10-07 ENCOUNTER — OFFICE VISIT (OUTPATIENT)
Dept: ORTHOPEDICS | Facility: CLINIC | Age: 70
End: 2020-10-07
Payer: MEDICARE

## 2020-10-07 VITALS — BODY MASS INDEX: 36.33 KG/M2 | HEIGHT: 62 IN | WEIGHT: 197.44 LBS

## 2020-10-07 DIAGNOSIS — G89.29 CHRONIC PAIN OF LEFT KNEE: Primary | ICD-10-CM

## 2020-10-07 DIAGNOSIS — M25.562 CHRONIC PAIN OF LEFT KNEE: Primary | ICD-10-CM

## 2020-10-07 DIAGNOSIS — Z96.652 S/P TOTAL KNEE REPLACEMENT USING CEMENT, LEFT: ICD-10-CM

## 2020-10-07 PROCEDURE — 1159F PR MEDICATION LIST DOCUMENTED IN MEDICAL RECORD: ICD-10-PCS | Mod: S$GLB,,, | Performed by: ORTHOPAEDIC SURGERY

## 2020-10-07 PROCEDURE — 99212 PR OFFICE/OUTPT VISIT, EST, LEVL II, 10-19 MIN: ICD-10-PCS | Mod: S$GLB,,, | Performed by: ORTHOPAEDIC SURGERY

## 2020-10-07 PROCEDURE — 1126F PR PAIN SEVERITY QUANTIFIED, NO PAIN PRESENT: ICD-10-PCS | Mod: S$GLB,,, | Performed by: ORTHOPAEDIC SURGERY

## 2020-10-07 PROCEDURE — 1126F AMNT PAIN NOTED NONE PRSNT: CPT | Mod: S$GLB,,, | Performed by: ORTHOPAEDIC SURGERY

## 2020-10-07 PROCEDURE — 3008F BODY MASS INDEX DOCD: CPT | Mod: CPTII,S$GLB,, | Performed by: ORTHOPAEDIC SURGERY

## 2020-10-07 PROCEDURE — 1101F PT FALLS ASSESS-DOCD LE1/YR: CPT | Mod: CPTII,S$GLB,, | Performed by: ORTHOPAEDIC SURGERY

## 2020-10-07 PROCEDURE — 3008F PR BODY MASS INDEX (BMI) DOCUMENTED: ICD-10-PCS | Mod: CPTII,S$GLB,, | Performed by: ORTHOPAEDIC SURGERY

## 2020-10-07 PROCEDURE — 1101F PR PT FALLS ASSESS DOC 0-1 FALLS W/OUT INJ PAST YR: ICD-10-PCS | Mod: CPTII,S$GLB,, | Performed by: ORTHOPAEDIC SURGERY

## 2020-10-07 PROCEDURE — 99212 OFFICE O/P EST SF 10 MIN: CPT | Mod: S$GLB,,, | Performed by: ORTHOPAEDIC SURGERY

## 2020-10-07 PROCEDURE — 1159F MED LIST DOCD IN RCRD: CPT | Mod: S$GLB,,, | Performed by: ORTHOPAEDIC SURGERY

## 2020-10-07 PROCEDURE — 99999 PR PBB SHADOW E&M-EST. PATIENT-LVL III: CPT | Mod: PBBFAC,,, | Performed by: ORTHOPAEDIC SURGERY

## 2020-10-07 PROCEDURE — 99999 PR PBB SHADOW E&M-EST. PATIENT-LVL III: ICD-10-PCS | Mod: PBBFAC,,, | Performed by: ORTHOPAEDIC SURGERY

## 2020-10-07 NOTE — PROGRESS NOTES
Kamla Espinoza is in for one year follow up for a  Left TKA.  She is doing very well.  No pain in the knee.  She has resumed activities of daily living. She has been active  Exam demonstrates  A well developed female in no distress.  Alert and oriented.  Mood and affect are appropriate.    Knee incision is well healed.  ROM is 0-120.  The patella tracks well and there is no instability. The extremity is neurovascularly intact.    Xrays demonstrate a well fixed and positioned prosthesis.      Imp:Doing well    F/u in one year with xrays and PROMS

## 2020-11-04 ENCOUNTER — TELEPHONE (OUTPATIENT)
Dept: BARIATRICS | Facility: CLINIC | Age: 70
End: 2020-11-04

## 2020-11-04 NOTE — TELEPHONE ENCOUNTER
Attempted to reach pt in regards to rescheduling f/u on today with  due to unexpected leave . No answer, lvm requesting a call back.

## 2021-01-12 ENCOUNTER — TELEPHONE (OUTPATIENT)
Dept: BARIATRICS | Facility: CLINIC | Age: 71
End: 2021-01-12

## 2021-02-02 ENCOUNTER — OFFICE VISIT (OUTPATIENT)
Dept: BARIATRICS | Facility: CLINIC | Age: 71
End: 2021-02-02
Payer: MEDICARE

## 2021-02-02 VITALS
WEIGHT: 205.94 LBS | HEIGHT: 62 IN | HEART RATE: 76 BPM | BODY MASS INDEX: 37.9 KG/M2 | OXYGEN SATURATION: 99 % | SYSTOLIC BLOOD PRESSURE: 131 MMHG | DIASTOLIC BLOOD PRESSURE: 60 MMHG

## 2021-02-02 DIAGNOSIS — E66.01 CLASS 2 SEVERE OBESITY WITH BODY MASS INDEX (BMI) OF 35 TO 39.9 WITH SERIOUS COMORBIDITY: Primary | ICD-10-CM

## 2021-02-02 PROCEDURE — 3075F SYST BP GE 130 - 139MM HG: CPT | Mod: CPTII,S$GLB,, | Performed by: INTERNAL MEDICINE

## 2021-02-02 PROCEDURE — 1157F ADVNC CARE PLAN IN RCRD: CPT | Mod: S$GLB,,, | Performed by: INTERNAL MEDICINE

## 2021-02-02 PROCEDURE — 99213 PR OFFICE/OUTPT VISIT, EST, LEVL III, 20-29 MIN: ICD-10-PCS | Mod: S$GLB,,, | Performed by: INTERNAL MEDICINE

## 2021-02-02 PROCEDURE — 3008F BODY MASS INDEX DOCD: CPT | Mod: CPTII,S$GLB,, | Performed by: INTERNAL MEDICINE

## 2021-02-02 PROCEDURE — 1125F PR PAIN SEVERITY QUANTIFIED, PAIN PRESENT: ICD-10-PCS | Mod: S$GLB,,, | Performed by: INTERNAL MEDICINE

## 2021-02-02 PROCEDURE — 99999 PR PBB SHADOW E&M-EST. PATIENT-LVL IV: CPT | Mod: PBBFAC,,, | Performed by: INTERNAL MEDICINE

## 2021-02-02 PROCEDURE — 99213 OFFICE O/P EST LOW 20 MIN: CPT | Mod: S$GLB,,, | Performed by: INTERNAL MEDICINE

## 2021-02-02 PROCEDURE — 1101F PT FALLS ASSESS-DOCD LE1/YR: CPT | Mod: CPTII,S$GLB,, | Performed by: INTERNAL MEDICINE

## 2021-02-02 PROCEDURE — 99999 PR PBB SHADOW E&M-EST. PATIENT-LVL IV: ICD-10-PCS | Mod: PBBFAC,,, | Performed by: INTERNAL MEDICINE

## 2021-02-02 PROCEDURE — 3078F PR MOST RECENT DIASTOLIC BLOOD PRESSURE < 80 MM HG: ICD-10-PCS | Mod: CPTII,S$GLB,, | Performed by: INTERNAL MEDICINE

## 2021-02-02 PROCEDURE — 3288F PR FALLS RISK ASSESSMENT DOCUMENTED: ICD-10-PCS | Mod: CPTII,S$GLB,, | Performed by: INTERNAL MEDICINE

## 2021-02-02 PROCEDURE — 1125F AMNT PAIN NOTED PAIN PRSNT: CPT | Mod: S$GLB,,, | Performed by: INTERNAL MEDICINE

## 2021-02-02 PROCEDURE — 1157F PR ADVANCE CARE PLAN OR EQUIV PRESENT IN MEDICAL RECORD: ICD-10-PCS | Mod: S$GLB,,, | Performed by: INTERNAL MEDICINE

## 2021-02-02 PROCEDURE — 1159F MED LIST DOCD IN RCRD: CPT | Mod: S$GLB,,, | Performed by: INTERNAL MEDICINE

## 2021-02-02 PROCEDURE — 3078F DIAST BP <80 MM HG: CPT | Mod: CPTII,S$GLB,, | Performed by: INTERNAL MEDICINE

## 2021-02-02 PROCEDURE — 3075F PR MOST RECENT SYSTOLIC BLOOD PRESS GE 130-139MM HG: ICD-10-PCS | Mod: CPTII,S$GLB,, | Performed by: INTERNAL MEDICINE

## 2021-02-02 PROCEDURE — 1159F PR MEDICATION LIST DOCUMENTED IN MEDICAL RECORD: ICD-10-PCS | Mod: S$GLB,,, | Performed by: INTERNAL MEDICINE

## 2021-02-02 PROCEDURE — 1101F PR PT FALLS ASSESS DOC 0-1 FALLS W/OUT INJ PAST YR: ICD-10-PCS | Mod: CPTII,S$GLB,, | Performed by: INTERNAL MEDICINE

## 2021-02-02 PROCEDURE — 3008F PR BODY MASS INDEX (BMI) DOCUMENTED: ICD-10-PCS | Mod: CPTII,S$GLB,, | Performed by: INTERNAL MEDICINE

## 2021-02-02 PROCEDURE — 3288F FALL RISK ASSESSMENT DOCD: CPT | Mod: CPTII,S$GLB,, | Performed by: INTERNAL MEDICINE

## 2021-02-02 RX ORDER — TOPIRAMATE 50 MG/1
TABLET, FILM COATED ORAL
Qty: 42 TABLET | Refills: 0 | Status: SHIPPED | OUTPATIENT
Start: 2021-02-02

## 2021-02-02 RX ORDER — TOPIRAMATE 50 MG/1
TABLET, FILM COATED ORAL
Qty: 270 TABLET | Refills: 1 | Status: SHIPPED | OUTPATIENT
Start: 2021-02-02 | End: 2021-02-02 | Stop reason: SDUPTHER

## 2021-04-26 ENCOUNTER — PATIENT MESSAGE (OUTPATIENT)
Dept: RESEARCH | Facility: HOSPITAL | Age: 71
End: 2021-04-26

## 2021-05-11 ENCOUNTER — HOSPITAL ENCOUNTER (EMERGENCY)
Facility: HOSPITAL | Age: 71
Discharge: HOME | End: 2021-05-12
Attending: EMERGENCY MEDICINE
Payer: MEDICARE

## 2021-05-11 DIAGNOSIS — J02.9 SORE THROAT: ICD-10-CM

## 2021-05-11 DIAGNOSIS — J36 PERITONSILLAR ABSCESS: Primary | ICD-10-CM

## 2021-05-11 LAB
ANION GAP SERPL CALC-SCNC: 14 MMOL/L (ref 8–16)
BASOPHILS # BLD AUTO: 0.03 K/UL (ref 0–0.2)
BASOPHILS NFR BLD: 0.3 % (ref 0–1.9)
BUN SERPL-MCNC: 18 MG/DL (ref 6–30)
CHLORIDE SERPL-SCNC: 100 MMOL/L (ref 95–110)
CREAT SERPL-MCNC: 1 MG/DL (ref 0.5–1.4)
CTP QC/QA: YES
DIFFERENTIAL METHOD: ABNORMAL
EOSINOPHIL # BLD AUTO: 0 K/UL (ref 0–0.5)
EOSINOPHIL NFR BLD: 0.1 % (ref 0–8)
ERYTHROCYTE [DISTWIDTH] IN BLOOD BY AUTOMATED COUNT: 16 % (ref 11.5–14.5)
GLUCOSE SERPL-MCNC: 117 MG/DL (ref 70–110)
HCT VFR BLD AUTO: 34.4 % (ref 37–48.5)
HCT VFR BLD CALC: 37 %PCV (ref 36–54)
HGB BLD-MCNC: 11.3 G/DL (ref 12–16)
IMM GRANULOCYTES # BLD AUTO: 0.04 K/UL (ref 0–0.04)
IMM GRANULOCYTES NFR BLD AUTO: 0.4 % (ref 0–0.5)
LYMPHOCYTES # BLD AUTO: 1 K/UL (ref 1–4.8)
LYMPHOCYTES NFR BLD: 10 % (ref 18–48)
MCH RBC QN AUTO: 24.3 PG (ref 27–31)
MCHC RBC AUTO-ENTMCNC: 32.8 G/DL (ref 32–36)
MCV RBC AUTO: 74 FL (ref 82–98)
MONOCYTES # BLD AUTO: 1 K/UL (ref 0.3–1)
MONOCYTES NFR BLD: 9.2 % (ref 4–15)
NEUTROPHILS # BLD AUTO: 8.4 K/UL (ref 1.8–7.7)
NEUTROPHILS NFR BLD: 80 % (ref 38–73)
NRBC BLD-RTO: 0 /100 WBC
PLATELET # BLD AUTO: 209 K/UL (ref 150–450)
PMV BLD AUTO: 10.8 FL (ref 9.2–12.9)
POC IONIZED CALCIUM: 1.27 MMOL/L (ref 1.06–1.42)
POC TCO2 (MEASURED): 27 MMOL/L (ref 23–29)
POTASSIUM BLD-SCNC: 3.8 MMOL/L (ref 3.5–5.1)
RBC # BLD AUTO: 4.65 M/UL (ref 4–5.4)
SAMPLE: ABNORMAL
SARS-COV-2 RDRP RESP QL NAA+PROBE: NEGATIVE
SODIUM BLD-SCNC: 137 MMOL/L (ref 136–145)
WBC # BLD AUTO: 10.45 K/UL (ref 3.9–12.7)

## 2021-05-11 PROCEDURE — 82330 ASSAY OF CALCIUM: CPT

## 2021-05-11 PROCEDURE — 25500020 PHARM REV CODE 255: Performed by: EMERGENCY MEDICINE

## 2021-05-11 PROCEDURE — 25000003 PHARM REV CODE 250: Performed by: PHYSICIAN ASSISTANT

## 2021-05-11 PROCEDURE — 85025 COMPLETE CBC W/AUTO DIFF WBC: CPT | Performed by: NURSE PRACTITIONER

## 2021-05-11 PROCEDURE — U0002 COVID-19 LAB TEST NON-CDC: HCPCS | Performed by: PHYSICIAN ASSISTANT

## 2021-05-11 PROCEDURE — 85014 HEMATOCRIT: CPT

## 2021-05-11 PROCEDURE — 63600175 PHARM REV CODE 636 W HCPCS: Performed by: NURSE PRACTITIONER

## 2021-05-11 PROCEDURE — 99900035 HC TECH TIME PER 15 MIN (STAT)

## 2021-05-11 PROCEDURE — 82565 ASSAY OF CREATININE: CPT

## 2021-05-11 PROCEDURE — 84132 ASSAY OF SERUM POTASSIUM: CPT

## 2021-05-11 PROCEDURE — 96375 TX/PRO/DX INJ NEW DRUG ADDON: CPT

## 2021-05-11 PROCEDURE — 96361 HYDRATE IV INFUSION ADD-ON: CPT

## 2021-05-11 PROCEDURE — 96365 THER/PROPH/DIAG IV INF INIT: CPT | Mod: 59

## 2021-05-11 PROCEDURE — 84295 ASSAY OF SERUM SODIUM: CPT

## 2021-05-11 PROCEDURE — 99285 EMERGENCY DEPT VISIT HI MDM: CPT | Mod: 25

## 2021-05-11 PROCEDURE — 25000003 PHARM REV CODE 250: Performed by: NURSE PRACTITIONER

## 2021-05-11 RX ORDER — CLINDAMYCIN PHOSPHATE 900 MG/50ML
900 INJECTION, SOLUTION INTRAVENOUS
Status: COMPLETED | OUTPATIENT
Start: 2021-05-11 | End: 2021-05-11

## 2021-05-11 RX ORDER — DEXAMETHASONE SODIUM PHOSPHATE 4 MG/ML
8 INJECTION, SOLUTION INTRA-ARTICULAR; INTRALESIONAL; INTRAMUSCULAR; INTRAVENOUS; SOFT TISSUE
Status: COMPLETED | OUTPATIENT
Start: 2021-05-11 | End: 2021-05-11

## 2021-05-11 RX ORDER — ACETAMINOPHEN 325 MG/1
650 TABLET ORAL
Status: COMPLETED | OUTPATIENT
Start: 2021-05-11 | End: 2021-05-11

## 2021-05-11 RX ADMIN — CLINDAMYCIN IN 5 PERCENT DEXTROSE 900 MG: 18 INJECTION, SOLUTION INTRAVENOUS at 11:05

## 2021-05-11 RX ADMIN — DEXAMETHASONE SODIUM PHOSPHATE 8 MG: 4 INJECTION INTRA-ARTICULAR; INTRALESIONAL; INTRAMUSCULAR; INTRAVENOUS; SOFT TISSUE at 10:05

## 2021-05-11 RX ADMIN — ACETAMINOPHEN 650 MG: 325 TABLET ORAL at 09:05

## 2021-05-11 RX ADMIN — IOHEXOL 70 ML: 350 INJECTION, SOLUTION INTRAVENOUS at 10:05

## 2021-05-11 RX ADMIN — SODIUM CHLORIDE 500 ML: 0.9 INJECTION, SOLUTION INTRAVENOUS at 10:05

## 2021-05-12 VITALS
SYSTOLIC BLOOD PRESSURE: 144 MMHG | BODY MASS INDEX: 31.65 KG/M2 | HEIGHT: 65 IN | OXYGEN SATURATION: 100 % | RESPIRATION RATE: 16 BRPM | DIASTOLIC BLOOD PRESSURE: 76 MMHG | TEMPERATURE: 98 F | HEART RATE: 86 BPM | WEIGHT: 190 LBS

## 2021-05-12 PROBLEM — J36 PERITONSILLAR ABSCESS: Status: ACTIVE | Noted: 2021-05-12

## 2021-05-12 PROCEDURE — 96376 TX/PRO/DX INJ SAME DRUG ADON: CPT

## 2021-05-12 PROCEDURE — 63600175 PHARM REV CODE 636 W HCPCS: Performed by: EMERGENCY MEDICINE

## 2021-05-12 PROCEDURE — 25000003 PHARM REV CODE 250: Performed by: EMERGENCY MEDICINE

## 2021-05-12 PROCEDURE — 96375 TX/PRO/DX INJ NEW DRUG ADDON: CPT | Mod: 59

## 2021-05-12 PROCEDURE — 42700 I&D ABSCESS PERITONSILLAR: CPT | Mod: LT

## 2021-05-12 RX ORDER — LIDOCAINE HYDROCHLORIDE AND EPINEPHRINE 10; 10 MG/ML; UG/ML
5 INJECTION, SOLUTION INFILTRATION; PERINEURAL ONCE
Status: COMPLETED | OUTPATIENT
Start: 2021-05-12 | End: 2021-05-12

## 2021-05-12 RX ORDER — DEXAMETHASONE SODIUM PHOSPHATE 4 MG/ML
4 INJECTION, SOLUTION INTRA-ARTICULAR; INTRALESIONAL; INTRAMUSCULAR; INTRAVENOUS; SOFT TISSUE
Status: COMPLETED | OUTPATIENT
Start: 2021-05-12 | End: 2021-05-12

## 2021-05-12 RX ORDER — CLINDAMYCIN HYDROCHLORIDE 150 MG/1
300 CAPSULE ORAL 4 TIMES DAILY
Qty: 80 CAPSULE | Refills: 0 | Status: SHIPPED | OUTPATIENT
Start: 2021-05-12 | End: 2021-05-22

## 2021-05-12 RX ORDER — KETOROLAC TROMETHAMINE 30 MG/ML
10 INJECTION, SOLUTION INTRAMUSCULAR; INTRAVENOUS
Status: COMPLETED | OUTPATIENT
Start: 2021-05-12 | End: 2021-05-12

## 2021-05-12 RX ADMIN — LIDOCAINE HYDROCHLORIDE AND EPINEPHRINE 5 ML: 10; 10 INJECTION, SOLUTION INFILTRATION; PERINEURAL at 03:05

## 2021-05-12 RX ADMIN — DEXAMETHASONE SODIUM PHOSPHATE 4 MG: 4 INJECTION INTRA-ARTICULAR; INTRALESIONAL; INTRAMUSCULAR; INTRAVENOUS; SOFT TISSUE at 03:05

## 2021-05-12 RX ADMIN — KETOROLAC TROMETHAMINE 10 MG: 30 INJECTION, SOLUTION INTRAMUSCULAR; INTRAVENOUS at 03:05

## 2022-08-03 ENCOUNTER — PATIENT MESSAGE (OUTPATIENT)
Dept: BARIATRICS | Facility: CLINIC | Age: 72
End: 2022-08-03
Payer: MEDICARE

## 2022-10-06 ENCOUNTER — PATIENT MESSAGE (OUTPATIENT)
Dept: BARIATRICS | Facility: CLINIC | Age: 72
End: 2022-10-06
Payer: MEDICARE

## 2023-10-11 NOTE — SUBJECTIVE & OBJECTIVE
"Principal Problem:S/P total knee replacement using cement, left    Principal Orthopedic Problem: s/p LL TKA    Interval History: NAEON. Pt doing well this am. Pain well controlled. 40 ft with PT yest. Denies CP/SOB/N/V.    Review of patient's allergies indicates:  No Known Allergies    Current Facility-Administered Medications   Medication    0.9%  NaCl infusion    acetaminophen tablet 1,000 mg    amLODIPine tablet 10 mg    apixaban tablet 2.5 mg    bisacodyl suppository 10 mg    dextrose 10% (D10W) Bolus    dextrose 10% (D10W) Bolus    famotidine tablet 20 mg    glucagon (human recombinant) injection 1 mg    glucose chewable tablet 16 g    glucose chewable tablet 24 g    influenza (HIGH-DOSE PF) vaccine 0.5 mL    insulin aspart U-100 pen 1-10 Units    lisinopril-hydrochlorothiazide 20-25 mg per tablet 1 tablet    morphine injection 2 mg    morphine injection 2 mg    morphine injection 2 mg    mupirocin 2 % ointment 1 g    naloxone 0.4 mg/mL injection 0.02 mg    ondansetron injection 4 mg    oxybutynin tablet 5 mg    oxyCODONE immediate release tablet 10 mg    oxyCODONE immediate release tablet 15 mg    oxyCODONE immediate release tablet 5 mg    polyethylene glycol packet 17 g    pregabalin capsule 75 mg    promethazine (PHENERGAN) 6.25 mg in dextrose 5 % 50 mL IVPB    ramelteon tablet 8 mg    ropivacaine (PF) 2 mg/ml (0.2%) infusion    senna-docusate 8.6-50 mg per tablet 1 tablet    sodium chloride 0.9% flush 10 mL    topiramate tablet 50 mg     Objective:     Vital Signs (Most Recent):  Temp: 98 °F (36.7 °C) (11/20/19 0413)  Pulse: 71 (11/20/19 0458)  Resp: 16 (11/20/19 0458)  BP: (!) 112/55 (11/20/19 0413)  SpO2: 97 % (11/20/19 0458) Vital Signs (24h Range):  Temp:  [97.6 °F (36.4 °C)-98.1 °F (36.7 °C)] 98 °F (36.7 °C)  Pulse:  [58-83] 71  Resp:  [14-19] 16  SpO2:  [94 %-100 %] 97 %  BP: ()/(50-66) 112/55     Weight: 98 kg (216 lb)  Height: 5' 1.5" (156.2 cm)  Body mass index " is 40.15 kg/m².      Intake/Output Summary (Last 24 hours) at 11/20/2019 0638  Last data filed at 11/19/2019 1433  Gross per 24 hour   Intake 1000 ml   Output --   Net 1000 ml       Ortho/SPM Exam   AAOx4  NAD  Reg rate  No increased WOB  Dressing c/d/i  Polar ice in place  SILT T/SP/DP/Frey/Sa  Motor intact T/SP/DP  WWP extremities  FCDs in place and functioning      Significant Labs: None    Significant Imaging: I have reviewed and interpreted all pertinent imaging results/findings.   Wartpeel Counseling:  I discussed with the patient the risks of Wartpeel including but not limited to erythema, scaling, itching, weeping, crusting, and pain.

## (undated) DEVICE — SET CEMENT (SCULP)

## (undated) DEVICE — ELECTRODE REM PLYHSV RETURN 9

## (undated) DEVICE — SEE MEDLINE ITEM 146271

## (undated) DEVICE — SEE MEDLINE ITEM 152487

## (undated) DEVICE — PAD CAST SPECIALIST STRL 6

## (undated) DEVICE — PULSAVAC ZIMMER

## (undated) DEVICE — DRAPE INCISE IOBAN 2 23X33IN

## (undated) DEVICE — UNDERGLOVES BIOGEL PI SIZE 8.5

## (undated) DEVICE — SYR 50CC LL

## (undated) DEVICE — SUT VICRYL PLUS 2-0 CT1 18

## (undated) DEVICE — GLOVE BIOGEL SKINSENSE PI 8.0

## (undated) DEVICE — SOL IRR NACL .9% 3000ML

## (undated) DEVICE — DRAPE STERI INSTRUMENT 1018

## (undated) DEVICE — PAD COLD THERAPY KNEE WRAP ON

## (undated) DEVICE — TAPE SILK 3IN

## (undated) DEVICE — KIT TOTAL KNEE TKOFG

## (undated) DEVICE — SEE MEDLINE ITEM 154981

## (undated) DEVICE — BOWL CEMENT

## (undated) DEVICE — BLADE SAG DUAL 18MMX1.27MMX90M

## (undated) DEVICE — DRESSING AQUACEL AG ADV 3.5X12

## (undated) DEVICE — TOURNIQUET SB QC DP 34X4IN

## (undated) DEVICE — SET DECANTER MEDICHOICE

## (undated) DEVICE — SEE MEDLINE ITEM 146298

## (undated) DEVICE — SUT VICRYL PLUS 0 CT1 18IN

## (undated) DEVICE — NDL 18GA X1 1/2 REG BEVEL

## (undated) DEVICE — SEE MEDLINE ITEM 146270

## (undated) DEVICE — SUT VICRYL+ 1 CT1 18IN

## (undated) DEVICE — SEE MEDLINE ITEM 157131

## (undated) DEVICE — BLADE SAGITTAL 18 X 1.27 X 90M

## (undated) DEVICE — ADHESIVE DERMABOND ADVANCED

## (undated) DEVICE — SYR ONLY LUER LOCK 20CC

## (undated) DEVICE — MASK FLYTE HOOD PEEL AWAY

## (undated) DEVICE — Device

## (undated) DEVICE — PUMP COLD THERAPY

## (undated) DEVICE — SUT MCRYL PLUS 4-0 PS2 27IN